# Patient Record
Sex: MALE | Race: WHITE | Employment: UNEMPLOYED | RURAL
[De-identification: names, ages, dates, MRNs, and addresses within clinical notes are randomized per-mention and may not be internally consistent; named-entity substitution may affect disease eponyms.]

---

## 2017-01-30 ENCOUNTER — OFFICE VISIT (OUTPATIENT)
Dept: FAMILY MEDICINE CLINIC | Age: 62
End: 2017-01-30

## 2017-01-30 VITALS
DIASTOLIC BLOOD PRESSURE: 73 MMHG | HEIGHT: 72 IN | SYSTOLIC BLOOD PRESSURE: 126 MMHG | BODY MASS INDEX: 24.03 KG/M2 | OXYGEN SATURATION: 96 % | HEART RATE: 66 BPM | TEMPERATURE: 98.2 F | WEIGHT: 177.4 LBS | RESPIRATION RATE: 20 BRPM

## 2017-01-30 DIAGNOSIS — Z13.39 SCREENING FOR ALCOHOLISM: ICD-10-CM

## 2017-01-30 DIAGNOSIS — Z13.31 SCREENING FOR DEPRESSION: ICD-10-CM

## 2017-01-30 DIAGNOSIS — Z00.00 ROUTINE GENERAL MEDICAL EXAMINATION AT A HEALTH CARE FACILITY: Primary | ICD-10-CM

## 2017-01-30 NOTE — MR AVS SNAPSHOT
Visit Information Date & Time Provider Department Dept. Phone Encounter #  
 1/30/2017  9:20 AM Mayito Demarco MD [facility] [place] [id number] Upcoming Health Maintenance Date Due Pneumococcal 19-64 Medium Risk (1 of 1 - PPSV23) 12/15/1972 DTaP/Tdap/Td series (1 - Tdap) 12/15/1974 ZOSTER VACCINE AGE 60> 12/15/2013 FOBT Q 1 YEAR AGE 50-75 11/17/2017 Allergies as of 1/30/2017  Review Complete On: 1/30/2017 By: Mayito Demarco MD  
  
 Severity Noted Reaction Type Reactions Percocet [Oxycodone-acetaminophen]  01/28/2015    Rash, Itching  
 itching Current Immunizations  Never Reviewed No immunizations on file. Not reviewed this visit You Were Diagnosed With   
  
 Codes Comments Routine general medical examination at a health care facility    -  Primary ICD-10-CM: Z00.00 ICD-9-CM: V70.0 Screening for alcoholism     ICD-10-CM: Z13.89 ICD-9-CM: V79.1 Screening for depression     ICD-10-CM: Z13.89 ICD-9-CM: V79.0 Vitals BP Pulse Temp Resp Height(growth percentile) Weight(growth percentile) 126/73 66 98.2 °F (36.8 °C) (Oral) 20 6' (1.829 m) 177 lb 6.4 oz (80.5 kg) SpO2 BMI Smoking Status 96% 24.06 kg/m2 Current Every Day Smoker Vitals History BMI and BSA Data Body Mass Index Body Surface Area 24.06 kg/m 2 2.02 m 2 Preferred Pharmacy Pharmacy Name Phone [facility] [address] [phone] Your Updated Medication List  
  
   
This list is accurate as of: 1/30/17  9:55 AM.  Always use your most recent med list.  
  
  
  
  
 diazePAM 5 mg tablet Commonly known as:  VALIUM Take 1 Tab by mouth every eight (8) hours as needed. Max Daily Amount: 15 mg. DULoxetine 30 mg capsule Commonly known as:  CYMBALTA TAKE ONE CAPSULE BY MOUTH EVERY DAY  
  
 hydrocortisone 2.5 % topical cream  
 Commonly known as:  ANUSOL-HC Apply  to affected area two (2) times a day. use thin layer  
  
 pravastatin 40 mg tablet Commonly known as:  PRAVACHOL  
TAKE ONE TABLET BY MOUTH nightly * tamsulosin 0.4 mg capsule Commonly known as:  FLOMAX Take 1 Cap by mouth daily for 30 days. Indications: Urolithiasis * tamsulosin 0.4 mg capsule Commonly known as:  FLOMAX Take 1 Cap by mouth daily. Indications: Urolithiasis  
  
 traZODone 150 mg tablet Commonly known as:  DESYREL  
TAKE ONE TABLET BY MOUTH NIGHTLY * Notice: This list has 2 medication(s) that are the same as other medications prescribed for you. Read the directions carefully, and ask your doctor or other care provider to review them with you. We Performed the Following [place] ANNUAL ALCOHOL SCREEN 15 MIN [id number] [facility]] Introducing [facility] SERVICES! Erin Medley introduces [employer] patient portal. Now you can access parts of your medical record, email your doctor's office, and request medication refills online. 1. In your internet browser, go to [email or web address]. [employer] 2. Click on the First Time User? Click Here link in the Sign In box. You will see the New Member Sign Up page. 3. Enter your [employer] Access Code exactly as it appears below. You will not need to use this code after youve completed the sign-up process. If you do not sign up before the expiration date, you must request a new code. · [employer] Access Code: [email or web address] Expires: 2/15/2017  1:35 PM 
 
4. Enter the last four digits of your Social Security Number (xxxx) and Date of Birth (mm/dd/yyyy) as indicated and click Submit. You will be taken to the next sign-up page. 5. Create a [employer] ID. This will be your [employer] login ID and cannot be changed, so think of one that is secure and easy to remember. 6. Create a [employer] password. You can change your password at any time. 7. Enter your Password Reset Question and Answer. This can be used at a later time if you forget your password. 8. Enter your e-mail address. You will receive e-mail notification when new information is available in [address] 9. Click Sign Up. You can now view and download portions of your medical record. 10. Click the Download Summary menu link to download a portable copy of your medical information. If you have questions, please visit the Frequently Asked Questions section of the [employer] website. Remember, [employer] is NOT to be used for urgent needs. For medical emergencies, dial 911. Now available from your iPhone and Android! Please provide this summary of care documentation to your next provider. Your primary care clinician is listed as Donnette Gitelman. If you have any questions after today's visit, please call [phone].

## 2017-01-30 NOTE — PROGRESS NOTES
This is a Subsequent Medicare Annual Wellness Visit providing Personalized Prevention Plan Services (PPPS) (Performed 12 months after initial AWV and PPPS )    I have reviewed the patient's medical history in detail and updated the computerized patient record. History     Past Medical History   Diagnosis Date    Adverse effect of anesthesia      \"major fear of needles\"/raw feeling down throat    Arthritis      osteo    Chronic obstructive pulmonary disease (HCC)     Chronic pain      lower back - stenosis - had injections/steroids    Hypercholesterolemia     Ill-defined condition      low BP but not a problem    Psychiatric disorder      depression      Past Surgical History   Procedure Laterality Date    Hx colonoscopy      Hx gi       surgery for hemorrhoids    Hx heent       all teeth removed    Hx other surgical       growth on left side of testicle removed - benign    Hx orthopaedic       left hip replacement    Hx orthopaedic       infection after sutured finger/then healed/then had a growth removed 2 1/2 yrs later - benign    Colonoscopy N/A 8/23/2016     COLONOSCOPY performed by Amarilis Cooper MD at [facility] ENDOSCOPY     Current Outpatient Prescriptions   Medication Sig Dispense Refill    tamsulosin (FLOMAX) 0.4 mg capsule Take 1 Cap by mouth daily. Indications: Urolithiasis 30 Cap 3    pravastatin (PRAVACHOL) 40 mg tablet TAKE ONE TABLET BY MOUTH nightly 30 Tab 3    traZODone (DESYREL) 150 mg tablet TAKE ONE TABLET BY MOUTH NIGHTLY 30 Tab 3    DULoxetine (CYMBALTA) 30 mg capsule TAKE ONE CAPSULE BY MOUTH EVERY DAY 30 Cap 3    diazePAM (VALIUM) 5 mg tablet Take 1 Tab by mouth every eight (8) hours as needed. Max Daily Amount: 15 mg. 30 Tab 3    hydrocortisone (ANUSOL-HC) 2.5 % topical cream Apply  to affected area two (2) times a day. use thin layer 30 g 0    tamsulosin (FLOMAX) 0.4 mg capsule Take 1 Cap by mouth daily for 30 days.  Indications: Urolithiasis 8 Cap 0     Allergies Allergen Reactions    Percocet [Oxycodone-Acetaminophen] Rash and Itching     itching     Family History   Problem Relation Age of Onset    Cancer Mother      breast    Stroke Mother      x2    Cancer Father      throat/lung    Heart Disease Father     Lung Disease Father     Thyroid Disease Father     Heart Attack Father     Suicide Brother     Psychiatric Disorder Brother     Heart Attack Paternal Uncle     Heart Attack Paternal Uncle      x3     Social History   Substance Use Topics    Smoking status: Current Every Day Smoker     Packs/day: 0.50     Years: 45.00    Smokeless tobacco: Never Used    Alcohol use No     Patient Active Problem List   Diagnosis Code    Hyperlipemia E78.5    Insomnia G47.00    History of left hip replacement Z96.642    Depression F32.9    Tobacco abuse Z72.0       Depression Risk Factor Screening:     PHQ 2 / 9, over the last two weeks 11/17/2016   Little interest or pleasure in doing things Not at all   Feeling down, depressed or hopeless Not at all   Total Score PHQ 2 0     Alcohol Risk Factor Screening: On any occasion during the past 3 months, have you had more than 4 drinks containing alcohol? No    Do you average more than 14 drinks per week? No    Functional Ability and Level of Safety:     Hearing Loss   none    Activities of Daily Living   Self-care. Requires assistance with: no ADLs    Fall Risk     Fall Risk Assessment, last 12 mths 11/17/2016   Able to walk? Yes   Fall in past 12 months?  No     Abuse Screen   Patient is not abused    Review of Systems:  Constitutional: Negative for fatigue or malaise  Derm: Negative for rash or lesion  HEENT: Negative for acute hearing or vision changes  Cardiovascular: Negative for dizziness, chest pain or palpitations  Respiratory: Negative for cough, wheezing or SOB  Gastreintestinal: Negative for nausea or abdominal pain  Genital/urinary: Negative for dysuria or voiding dysfunction  Muscoloskeletal: Negative for acute myalgias or arthralgias   Neurological: Negative for headache, weakness or paresthesia  Psychological: Negative for depression or anxiety      Evaluation of Cognitive Function:  Mood/affect:  neutral  Appearance: age appropriate and casually dressed  Family member/caregiver input: none    Health Maintenance Due   Topic Date Due    Pneumococcal 19-64 Medium Risk (1 of 1 - PPSV23) 12/15/1972    DTaP/Tdap/Td series (1 - Tdap) 12/15/1974    ZOSTER VACCINE AGE 60>  12/15/2013     Risk, benefits of recommended health maintenance discussed. Patient expressed understanding and declined at this time. Physical Examination:  General: Well developed, well nourished, in no acute distress  Skin: Warm and dry, no rash or lesion appreciated  Head: Normocephalic, atraumatic  Eyes: Sclera clear, EOMI  Neck: Normal range of motion  Respiratory: Clear to auscultation bilaterally with symmetrical effort  Cardiovascular: Normal S1, S2, Regular rate and rhythm  Extremities: Full range of motion, antalgic gait  Neurological: Normal strength and sensation. No focal deficits  Psychological: Active, alert and oriented. Affect appropriate     Patient Care Team:  Dereck Ramires MD as PCP - General (Family Practice)  Evangelina Bryant MD (Orthopedic Surgery)  Fredrick Hudson MD (Neurosurgery)    Advice/Referrals/Counseling   Education and counseling provided:  Are appropriate based on today's review and evaluation  End-of-Life planning (with patient's consent)    Assessment/Plan       ICD-10-CM ICD-9-CM    1. Routine general medical examination at a health care facility Z00.00 V70.0    2. Screening for alcoholism Z13.89 V79.1 [place] ANNUAL ALCOHOL SCREEN 15 MIN   3. Screening for depression Z13.89 V79.0    .

## 2017-01-30 NOTE — PROGRESS NOTES
Health Maintenance Due   Topic Date Due    Pneumococcal 19-64 Medium Risk (1 of 1 - PPSV23) 12/15/1972    DTaP/Tdap/Td series (1 - Tdap) 12/15/1974    ZOSTER VACCINE AGE 60>  12/15/2013

## 2017-03-13 ENCOUNTER — OFFICE VISIT (OUTPATIENT)
Dept: FAMILY MEDICINE CLINIC | Age: 62
End: 2017-03-13

## 2017-03-13 VITALS
TEMPERATURE: 98.5 F | HEIGHT: 72 IN | WEIGHT: 174 LBS | HEART RATE: 61 BPM | OXYGEN SATURATION: 98 % | BODY MASS INDEX: 23.57 KG/M2 | DIASTOLIC BLOOD PRESSURE: 83 MMHG | SYSTOLIC BLOOD PRESSURE: 129 MMHG | RESPIRATION RATE: 18 BRPM

## 2017-03-13 DIAGNOSIS — Z71.82 EXERCISE COUNSELING: ICD-10-CM

## 2017-03-13 DIAGNOSIS — F33.41 RECURRENT MAJOR DEPRESSIVE DISORDER, IN PARTIAL REMISSION (HCC): ICD-10-CM

## 2017-03-13 DIAGNOSIS — J30.89 PERENNIAL ALLERGIC RHINITIS, UNSPECIFIED ALLERGIC RHINITIS TRIGGER: ICD-10-CM

## 2017-03-13 DIAGNOSIS — M48.061 SPINAL STENOSIS, LUMBAR: ICD-10-CM

## 2017-03-13 DIAGNOSIS — R97.20 ELEVATED PSA: ICD-10-CM

## 2017-03-13 DIAGNOSIS — I10 ESSENTIAL HYPERTENSION: ICD-10-CM

## 2017-03-13 DIAGNOSIS — Z71.3 DIETARY COUNSELING AND SURVEILLANCE: ICD-10-CM

## 2017-03-13 DIAGNOSIS — M51.36 DDD (DEGENERATIVE DISC DISEASE), LUMBAR: Primary | ICD-10-CM

## 2017-03-13 DIAGNOSIS — E78.5 HYPERLIPIDEMIA, UNSPECIFIED HYPERLIPIDEMIA TYPE: ICD-10-CM

## 2017-03-13 DIAGNOSIS — R63.4 WEIGHT LOSS: ICD-10-CM

## 2017-03-13 RX ORDER — TIZANIDINE 4 MG/1
4 TABLET ORAL
Qty: 120 TAB | Refills: 2 | Status: SHIPPED | OUTPATIENT
Start: 2017-03-13 | End: 2017-06-11

## 2017-03-13 RX ORDER — MONTELUKAST SODIUM 10 MG/1
10 TABLET ORAL DAILY
Qty: 30 TAB | Refills: 2 | Status: SHIPPED | OUTPATIENT
Start: 2017-03-13 | End: 2017-06-02 | Stop reason: SDUPTHER

## 2017-03-13 RX ORDER — HYDROCODONE BITARTRATE AND ACETAMINOPHEN 10; 325 MG/1; MG/1
1 TABLET ORAL
Qty: 60 TAB | Refills: 0 | Status: SHIPPED | OUTPATIENT
Start: 2017-03-13 | End: 2017-03-28

## 2017-03-13 NOTE — PROGRESS NOTES
Reviewed record in preparation for visit and have necessary documentation  Pt did not bring medication to office visit for review  opportunity was given for questions  Goals that were addressed and/or need to be completed during or after this appointment include    Health Maintenance Due   Topic Date Due    Pneumococcal 19-64 Medium Risk (1 of 1 - PPSV23) 12/15/1972    DTaP/Tdap/Td series (1 - Tdap) 12/15/1974    ZOSTER VACCINE AGE 60>  12/15/2013

## 2017-03-13 NOTE — PATIENT INSTRUCTIONS
Depression and Chronic Disease: Care Instructions  Your Care Instructions  A chronic disease is one that you have for a long time. Some chronic diseases can be controlled, but they usually cannot be cured. Depression is common in people with chronic diseases, but it often goes unnoticed. Many people have concerns about seeking treatment for a mental health problem. You may think it's a sign of weakness, or you don't want people to know about it. It's important to overcome these reasons for not seeking treatment. Treating depression or anxiety is good for your health. Follow-up care is a key part of your treatment and safety. Be sure to make and go to all appointments, and call your doctor if you are having problems. It's also a good idea to know your test results and keep a list of the medicines you take. How can you care for yourself at home? Watch for symptoms of depression  The symptoms of depression are often subtle at first. You may think they are caused by your disease rather than depression. Or you may think it is normal to be depressed when you have a chronic disease. If you are depressed you may:  · Feel sad or hopeless. · Feel guilty or worthless. · Not enjoy the things you used to enjoy. · Feel hopeless, as though life is not worth living. · Have trouble thinking or remembering. · Have low energy, and you may not eat or sleep well. · Pull away from others. · Think often about death or killing yourself. (Keep the numbers for these national suicide hotlines: 8-130-539-TALK [1-130.411.7838] and 5-780-MUEWMML [1-391.124.7415]. )  Get treatment  By treating your depression, you can feel more hopeful and have more energy. If you feel better, you may take better care of yourself, so your health may improve. · Talk to your doctor if you have any changes in mood during treatment for your disease. · Ask your doctor for help.  Counseling, antidepressant medicine, or a combination of the two can help most people with depression. Often a combination works best. Counseling can also help you cope with having a chronic disease. When should you call for help? Call 911 anytime you think you may need emergency care. For example, call if:  · You feel like hurting yourself or someone else. · Someone you know has depression and is about to attempt or is attempting suicide. Call your doctor now or seek immediate medical care if:  · You hear voices. · Someone you know has depression and:  ¨ Starts to give away his or her possessions. ¨ Uses illegal drugs or drinks alcohol heavily. ¨ Talks or writes about death, including writing suicide notes or talking about guns, knives, or pills. ¨ Starts to spend a lot of time alone. ¨ Acts very aggressively or suddenly appears calm. Watch closely for changes in your health, and be sure to contact your doctor if:  · You do not get better as expected. Where can you learn more? Go to http://catrachoShotfarmbryce.info/. Enter P478 in the search box to learn more about \"Depression and Chronic Disease: Care Instructions. \"  Current as of: July 26, 2016  Content Version: 11.1  © 7750-8634 [employer]. Care instructions adapted under license by [employer] (which disclaims liability or warranty for this information). If you have questions about a medical condition or this instruction, always ask your healthcare professional. [address] any warranty or liability for your use of this information. High Cholesterol: Care Instructions  Your Care Instructions  Cholesterol is a type of fat in your blood. It is needed for many body functions, such as making new cells. Cholesterol is made by your body. It also comes from food you eat. High cholesterol means that you have too much of the fat in your blood. This raises your risk of a heart attack and stroke. LDL and HDL are part of your total cholesterol.  LDL is the \"bad\" cholesterol. High LDL can raise your risk for heart disease, heart attack, and stroke. HDL is the \"good\" cholesterol. It helps clear bad cholesterol from the body. High HDL is linked with a lower risk of heart disease, heart attack, and stroke. Your cholesterol levels help your doctor find out your risk for having a heart attack or stroke. You and your doctor can talk about whether you need to lower your risk and what treatment is best for you. A heart-healthy lifestyle along with medicines can help lower your cholesterol and your risk. The way you choose to lower your risk will depend on how high your risk is for heart attack and stroke. It will also depend on how you feel about taking medicines. Follow-up care is a key part of your treatment and safety. Be sure to make and go to all appointments, and call your doctor if you are having problems. It's also a good idea to know your test results and keep a list of the medicines you take. How can you care for yourself at home? · Eat a variety of foods every day. Good choices include fruits, vegetables, whole grains (like oatmeal), dried beans and peas, nuts and seeds, soy products (like tofu), and fat-free or low-fat dairy products. · Replace butter, margarine, and hydrogenated or partially hydrogenated oils with olive and canola oils. (Canola oil margarine without trans fat is fine.)  · Replace red meat with fish, poultry, and soy protein (like tofu). · Limit processed and packaged foods like chips, crackers, and cookies. · Bake, broil, or steam foods. Don't garcia them. · Be physically active. Get at least 30 minutes of exercise on most days of the week. Walking is a good choice. You also may want to do other activities, such as running, swimming, cycling, or playing tennis or team sports. · Stay at a healthy weight or lose weight by making the changes in eating and physical activity listed above.  Losing just a small amount of weight, even 5 to 10 pounds, can reduce your risk for having a heart attack or stroke. · Do not smoke. When should you call for help? Watch closely for changes in your health, and be sure to contact your doctor if:  · You need help making lifestyle changes. · You have questions about your medicine. Where can you learn more? Go to http://catracho-bryce.info/. Enter S600 in the search box to learn more about \"High Cholesterol: Care Instructions. \"  Current as of: January 27, 2016  Content Version: 11.1  © 5341-1702 [email or web address]. Care instructions adapted under license by [employer] (which disclaims liability or warranty for this information). If you have questions about a medical condition or this instruction, always ask your healthcare professional. [address] any warranty or liability for your use of this information. High Blood Pressure: Care Instructions  Your Care Instructions  If your blood pressure is usually above 140/90, you have high blood pressure, or hypertension. That means the top number is 140 or higher or the bottom number is 90 or higher, or both. Despite what a lot of people think, high blood pressure usually doesn't cause headaches or make you feel dizzy or lightheaded. It usually has no symptoms. But it does increase your risk for heart attack, stroke, and kidney or eye damage. The higher your blood pressure, the more your risk increases. Your doctor will give you a goal for your blood pressure. Your goal will be based on your health and your age. An example of a goal is to keep your blood pressure below 140/90. Lifestyle changes, such as eating healthy and being active, are always important to help lower blood pressure. You might also take medicine to reach your blood pressure goal.  Follow-up care is a key part of your treatment and safety. Be sure to make and go to all appointments, and call your doctor if you are having problems.  It's also a good idea to know your test results and keep a list of the medicines you take. How can you care for yourself at home? Medical treatment  · If you stop taking your medicine, your blood pressure will go back up. You may take one or more types of medicine to lower your blood pressure. Be safe with medicines. Take your medicine exactly as prescribed. Call your doctor if you think you are having a problem with your medicine. · Talk to your doctor before you start taking aspirin every day. Aspirin can help certain people lower their risk of a heart attack or stroke. But taking aspirin isn't right for everyone, because it can cause serious bleeding. · See your doctor regularly. You may need to see the doctor more often at first or until your blood pressure comes down. · If you are taking blood pressure medicine, talk to your doctor before you take decongestants or anti-inflammatory medicine, such as ibuprofen. Some of these medicines can raise blood pressure. · Learn how to check your blood pressure at home. Lifestyle changes  · Stay at a healthy weight. This is especially important if you put on weight around the waist. Losing even 10 pounds can help you lower your blood pressure. · If your doctor recommends it, get more exercise. Walking is a good choice. Bit by bit, increase the amount you walk every day. Try for at least 30 minutes on most days of the week. You also may want to swim, bike, or do other activities. · Avoid or limit alcohol. Talk to your doctor about whether you can drink any alcohol. · Try to limit how much sodium you eat to less than 2,300 milligrams (mg) a day. Your doctor may ask you to try to eat less than 1,500 mg a day. · Eat plenty of fruits (such as bananas and oranges), vegetables, legumes, whole grains, and low-fat dairy products. · Lower the amount of saturated fat in your diet. Saturated fat is found in animal products such as milk, cheese, and meat.  Limiting these foods may help you lose weight and also lower your risk for heart disease. · Do not smoke. Smoking increases your risk for heart attack and stroke. If you need help quitting, talk to your doctor about stop-smoking programs and medicines. These can increase your chances of quitting for good. When should you call for help? Call 911 anytime you think you may need emergency care. This may mean having symptoms that suggest that your blood pressure is causing a serious heart or blood vessel problem. Your blood pressure may be over 180/110. For example, call 911 if:  · You have symptoms of a heart attack. These may include:  ¨ Chest pain or pressure, or a strange feeling in the chest.  ¨ Sweating. ¨ Shortness of breath. ¨ Nausea or vomiting. ¨ Pain, pressure, or a strange feeling in the back, neck, jaw, or upper belly or in one or both shoulders or arms. ¨ Lightheadedness or sudden weakness. ¨ A fast or irregular heartbeat. · You have symptoms of a stroke. These may include:  ¨ Sudden numbness, tingling, weakness, or loss of movement in your face, arm, or leg, especially on only one side of your body. ¨ Sudden vision changes. ¨ Sudden trouble speaking. ¨ Sudden confusion or trouble understanding simple statements. ¨ Sudden problems with walking or balance. ¨ A sudden, severe headache that is different from past headaches. · You have severe back or belly pain. Do not wait until your blood pressure comes down on its own. Get help right away. Call your doctor now or seek immediate care if:  · Your blood pressure is much higher than normal (such as 180/110 or higher), but you don't have symptoms. · You think high blood pressure is causing symptoms, such as:  ¨ Severe headache. ¨ Blurry vision. Watch closely for changes in your health, and be sure to contact your doctor if:  · Your blood pressure measures 140/90 or higher at least 2 times.  That means the top number is 140 or higher or the bottom number is 90 or higher, or both.  · You think you may be having side effects from your blood pressure medicine. · Your blood pressure is usually normal, but it goes above normal at least 2 times. Where can you learn more? Go to http://catracho-bryce.info/. Enter H597 in the search box to learn more about \"High Blood Pressure: Care Instructions. \"  Current as of: August 8, 2016  Content Version: 11.1  © 9528-3030 [employer]. Care instructions adapted under license by [employer] (which disclaims liability or warranty for this information). If you have questions about a medical condition or this instruction, always ask your healthcare professional. [address] any warranty or liability for your use of this information. Chronic Pain: Care Instructions  Your Care Instructions  Chronic pain is pain that lasts a long time (months or even years) and may or may not have a clear cause. It is different from acute pain, which usually does have a clear cause--like an injury or illness--and gets better over time. Chronic pain:  · Lasts over time but may vary from day to day. · Does not go away despite efforts to end it. · May disrupt your sleep and lead to fatigue. · May cause depression or anxiety. · May make your muscles tense, causing more pain. · Can disrupt your work, hobbies, home life, and relationships with friends and family. Chronic pain is a very real condition. It is not just in your head. Treatment can help and usually includes several methods used together, such as medicines, physical therapy, exercise, and other treatments. Learning how to relax and changing negative thought patterns can also help you cope. Chronic pain is complex. Taking an active role in your treatment will help you better manage your pain. Tell your doctor if you have trouble dealing with your pain. You may have to try several things before you find what works best for you.   Follow-up care is a key part of your treatment and safety. Be sure to make and go to all appointments, and call your doctor if you are having problems. Its also a good idea to know your test results and keep a list of the medicines you take. How can you care for yourself at home? · Pace yourself. Break up large jobs into smaller tasks. Save harder tasks for days when you have less pain, or go back and forth between hard tasks and easier ones. Take rest breaks. · Relax, and reduce stress. Relaxation techniques such as deep breathing or meditation can help. · Keep moving. Gentle, daily exercise can help reduce pain over the long run. Try low- or no-impact exercises such as walking, swimming, and stationary biking. Do stretches to stay flexible. · Try heat, cold packs, and massage. · Get enough sleep. Chronic pain can make you tired and drain your energy. Talk with your doctor if you have trouble sleeping because of pain. · Think positive. Your thoughts can affect your pain level. Do things that you enjoy to distract yourself when you have pain instead of focusing on the pain. See a movie, read a book, listen to music, or spend time with a friend. · If you think you are depressed, talk to your doctor about treatment. · Keep a daily pain diary. Record how your moods, thoughts, sleep patterns, activities, and medicine affect your pain. You may find that your pain is worse during or after certain activities or when you are feeling a certain emotion. Having a record of your pain can help you and your doctor find the best ways to treat your pain. · Take pain medicines exactly as directed. ¨ If the doctor gave you a prescription medicine for pain, take it as prescribed. ¨ If you are not taking a prescription pain medicine, ask your doctor if you can take an over-the-counter medicine. Reducing constipation caused by pain medicine  · Include fruits, vegetables, beans, and whole grains in your diet each day.  These foods are high in fiber. · Drink plenty of fluids, enough so that your urine is light yellow or clear like water. If you have kidney, heart, or liver disease and have to limit fluids, talk with your doctor before you increase the amount of fluids you drink. · If your doctor recommends it, get more exercise. Walking is a good choice. Bit by bit, increase the amount you walk every day. Try for at least 30 minutes on most days of the week. · Schedule time each day for a bowel movement. A daily routine may help. Take your time and do not strain when having a bowel movement. When should you call for help? Call your doctor now or seek immediate medical care if:  · Your pain gets worse or is out of control. · You feel down or blue, or you do not enjoy things like you once did. You may be depressed, which is common in people with chronic pain. Depression can be treated. · You have vomiting or cramps for more than 2 hours. Watch closely for changes in your health, and be sure to contact your doctor if:  · You cannot sleep because of pain. · You are very worried or anxious about your pain. · You have trouble taking your pain medicine. · You have any concerns about your pain medicine. · You have trouble with bowel movements, such as:  ¨ No bowel movement in 3 days. ¨ Blood in the anal area, in your stool, or on the toilet paper. ¨ Diarrhea for more than 24 hours. Where can you learn more? Go to http://catracho-bryce.info/. Enter N004 in the search box to learn more about \"Chronic Pain: Care Instructions. \"  Current as of: February 19, 2016  Content Version: 11.1  © 5306-7630 [employer]. Care instructions adapted under license by [employer] (which disclaims liability or warranty for this information).  If you have questions about a medical condition or this instruction, always ask your healthcare professional. [address] any warranty or liability for your use of this information.

## 2017-03-13 NOTE — MR AVS SNAPSHOT
Visit Information Date & Time Provider Department Dept. Phone Encounter #  
 3/13/2017  2:25 PM Meena Dooley  [facility] [phone] [id number] Follow-up Instructions Return in about 4 weeks (around 4/10/2017), or if symptoms worsen or fail to improve. Your Appointments 7/31/2017  9:00 AM  
ROUTINE CARE with Danial Meneses MD  
[facility]) Appt Note: 6 mnth fu est pt for depression [address] [address]  
[place] [address] [address] Upcoming Health Maintenance Date Due Pneumococcal 19-64 Medium Risk (1 of 1 - PPSV23) 12/15/1972 DTaP/Tdap/Td series (1 - Tdap) 12/15/1974 ZOSTER VACCINE AGE 60> 12/15/2013 FOBT Q 1 YEAR AGE 50-75 11/17/2017 Allergies as of 3/13/2017  Review Complete On: 3/13/2017 By: Marvin Sagastume LPN Severity Noted Reaction Type Reactions Percocet [Oxycodone-acetaminophen]  01/28/2015    Rash, Itching  
 itching Current Immunizations  Never Reviewed No immunizations on file. Not reviewed this visit You Were Diagnosed With   
  
 Codes Comments DDD (degenerative disc disease), lumbar    -  Primary ICD-10-CM: M51.36 
ICD-9-CM: 722.52 Spinal stenosis, lumbar     ICD-10-CM: M48.06 
ICD-9-CM: 724.02 Hyperlipidemia, unspecified hyperlipidemia type     ICD-10-CM: E78.5 ICD-9-CM: 272.4 Recurrent major depressive disorder, in partial remission [facility]     ICD-10-CM: F33.41 
ICD-9-CM: 296.35 Essential hypertension     ICD-10-CM: I10 
ICD-9-CM: 401.9 Elevated PSA     ICD-10-CM: R97.20 ICD-9-CM: 790.93 Weight loss     ICD-10-CM: R63.4 ICD-9-CM: 783.21 Perennial allergic rhinitis, unspecified allergic rhinitis trigger     ICD-10-CM: J30.89 ICD-9-CM: 477.8 Vitals BP Pulse Temp Resp Height(growth percentile) Weight(growth percentile) 129/83 (BP 1 Location: Left arm, BP Patient Position: Sitting) 61 98.5 °F (36.9 °C) (Oral) 18 6' (1.829 m) 174 lb (78.9 kg) SpO2 BMI Smoking Status 98% 23.6 kg/m2 Current Every Day Smoker Vitals History BMI and BSA Data Body Mass Index Body Surface Area  
 23.6 kg/m 2 2 m 2 Preferred Pharmacy Pharmacy Name Phone [facility] [address] [phone] Your Updated Medication List  
  
   
This list is accurate as of: 3/13/17  3:39 PM.  Always use your most recent med list.  
  
  
  
  
 diazePAM 5 mg tablet Commonly known as:  VALIUM Take 1 Tab by mouth every eight (8) hours as needed. Max Daily Amount: 15 mg. HYDROcodone-acetaminophen  mg tablet Commonly known as:  Rosa Parisian Take 1 Tab by mouth every six (6) hours as needed for Pain for up to 15 days. Max Daily Amount: 4 Tabs. Indications: Pain  
  
 hydrocortisone 2.5 % topical cream  
Commonly known as:  ANUSOL-HC Apply  to affected area two (2) times a day. use thin layer  
  
 montelukast 10 mg tablet Commonly known as:  SINGULAIR Take 1 Tab by mouth daily for 90 days. Indications: ALLERGIC RHINITIS  
  
 pravastatin 40 mg tablet Commonly known as:  PRAVACHOL  
TAKE ONE TABLET BY MOUTH nightly  
  
 tamsulosin 0.4 mg capsule Commonly known as:  FLOMAX Take 1 Cap by mouth daily for 30 days. Indications: Urolithiasis tiZANidine 4 mg tablet Commonly known as:  Rosa Isela Flattery Take 1 Tab by mouth every six (6) hours as needed for up to 90 days. Indications: MUSCLE SPASM  
  
 traZODone 150 mg tablet Commonly known as:  DESYREL  
TAKE ONE TABLET BY MOUTH NIGHTLY Prescriptions Printed Refills HYDROcodone-acetaminophen (NORCO)  mg tablet 0  Sig: Take 1 Tab by mouth every six (6) hours as needed for Pain for up to 15 days. Max Daily Amount: 4 Tabs. Indications: Pain Class: Print Route: Oral  
  
Prescriptions Sent to Pharmacy Refills  
 tiZANidine (ZANAFLEX) 4 mg tablet 2 Sig: Take 1 Tab by mouth every six (6) hours as needed for up to 90 days. Indications: MUSCLE SPASM Class: Normal  
 Pharmacy: [facility] [address] Ph #: [phone] Route: Oral  
 montelukast (SINGULAIR) 10 mg tablet 2 Sig: Take 1 Tab by mouth daily for 90 days. Indications: ALLERGIC RHINITIS Class: Normal  
 Pharmacy: [facility] [address] Ph #: [phone] Route: Oral  
  
We Performed the Following [id number] 9+OXYCODONE+CRT-UNBUND [67494 CPT(R)] CBC WITH AUTOMATED DIFF [99570 CPT(R)] HIV 1/2 AG/AB, 4TH GENERATION,W RFLX CONFIRM [[id number] Custom] METABOLIC PANEL, COMPREHENSIVE [22863 CPT(R)] PSA W/ REFLX FREE PSA [93225 CPT(R)] RPR [43680 CPT(R)] Follow-up Instructions Return in about 4 weeks (around 4/10/2017), or if symptoms worsen or fail to improve. To-Do List   
 03/13/2017 Imaging:  MRI LUMB SPINE WO CONT Patient Instructions Depression and Chronic Disease: Care Instructions Your Care Instructions A chronic disease is one that you have for a long time. Some chronic diseases can be controlled, but they usually cannot be cured. Depression is common in people with chronic diseases, but it often goes unnoticed. Many people have concerns about seeking treatment for a mental health problem. You may think it's a sign of weakness, or you don't want people to know about it. It's important to overcome these reasons for not seeking treatment. Treating depression or anxiety is good for your health. Follow-up care is a key part of your treatment and safety.  Be sure to make and go to all appointments, and call your doctor if you are having problems. It's also a good idea to know your test results and keep a list of the medicines you take. How can you care for yourself at home? Watch for symptoms of depression The symptoms of depression are often subtle at first. You may think they are caused by your disease rather than depression. Or you may think it is normal to be depressed when you have a chronic disease. If you are depressed you may: · Feel sad or hopeless. · Feel guilty or worthless. · Not enjoy the things you used to enjoy. · Feel hopeless, as though life is not worth living. · Have trouble thinking or remembering. · Have low energy, and you may not eat or sleep well. · Pull away from others. · Think often about death or killing yourself. (Keep the numbers for these national suicide hotlines: 0-823-772-TALK [1-509.198.5118] and 1-130-ZYJLVUR [1-532.731.2278]. ) Get treatment By treating your depression, you can feel more hopeful and have more energy. If you feel better, you may take better care of yourself, so your health may improve. · Talk to your doctor if you have any changes in mood during treatment for your disease. · Ask your doctor for help. Counseling, antidepressant medicine, or a combination of the two can help most people with depression. Often a combination works best. Counseling can also help you cope with having a chronic disease. When should you call for help? Call 911 anytime you think you may need emergency care. For example, call if: 
· You feel like hurting yourself or someone else. · Someone you know has depression and is about to attempt or is attempting suicide. Call your doctor now or seek immediate medical care if: 
· You hear voices. · Someone you know has depression and: 
¨ Starts to give away his or her possessions. ¨ Uses illegal drugs or drinks alcohol heavily. ¨ Talks or writes about death, including writing suicide notes or talking about guns, knives, or pills. ¨ Starts to spend a lot of time alone. ¨ Acts very aggressively or suddenly appears calm. Watch closely for changes in your health, and be sure to contact your doctor if: 
· You do not get better as expected. Where can you learn more? Go to http://catracho-bryce.info/. Enter V184 in the search box to learn more about \"Depression and Chronic Disease: Care Instructions. \" Current as of: July 26, 2016 Content Version: 11.1 © 9513-5613 ED01. Care instructions adapted under license by [employer] (which disclaims liability or warranty for this information). If you have questions about a medical condition or this instruction, always ask your healthcare professional. [place] [address] any warranty or liability for your use of this information. High Cholesterol: Care Instructions Your Care Instructions Cholesterol is a type of fat in your blood. It is needed for many body functions, such as making new cells. Cholesterol is made by your body. It also comes from food you eat. High cholesterol means that you have too much of the fat in your blood. This raises your risk of a heart attack and stroke. LDL and HDL are part of your total cholesterol. LDL is the \"bad\" cholesterol. High LDL can raise your risk for heart disease, heart attack, and stroke. HDL is the \"good\" cholesterol. It helps clear bad cholesterol from the body. High HDL is linked with a lower risk of heart disease, heart attack, and stroke. Your cholesterol levels help your doctor find out your risk for having a heart attack or stroke. You and your doctor can talk about whether you need to lower your risk and what treatment is best for you. A heart-healthy lifestyle along with medicines can help lower your cholesterol and your risk. The way you choose to lower your risk will depend on how high your risk is for heart attack and stroke.  It will also depend on how you feel about taking medicines. Follow-up care is a key part of your treatment and safety. Be sure to make and go to all appointments, and call your doctor if you are having problems. It's also a good idea to know your test results and keep a list of the medicines you take. How can you care for yourself at home? · Eat a variety of foods every day. Good choices include fruits, vegetables, whole grains (like oatmeal), dried beans and peas, nuts and seeds, soy products (like tofu), and fat-free or low-fat dairy products. · Replace butter, margarine, and hydrogenated or partially hydrogenated oils with olive and canola oils. (Canola oil margarine without trans fat is fine.) · Replace red meat with fish, poultry, and soy protein (like tofu). · Limit processed and packaged foods like chips, crackers, and cookies. · Bake, broil, or steam foods. Don't garcia them. · Be physically active. Get at least 30 minutes of exercise on most days of the week. Walking is a good choice. You also may want to do other activities, such as running, swimming, cycling, or playing tennis or team sports. · Stay at a healthy weight or lose weight by making the changes in eating and physical activity listed above. Losing just a small amount of weight, even 5 to 10 pounds, can reduce your risk for having a heart attack or stroke. · Do not smoke. When should you call for help? Watch closely for changes in your health, and be sure to contact your doctor if: 
· You need help making lifestyle changes. · You have questions about your medicine. Where can you learn more? Go to http://catracho-bryce.info/. Enter J045 in the search box to learn more about \"High Cholesterol: Care Instructions. \" Current as of: January 27, 2016 Content Version: 11.1 © 7047-7970 [employer].  Care instructions adapted under license by [employer] (which disclaims liability or warranty for this information). If you have questions about a medical condition or this instruction, always ask your healthcare professional. [address] any warranty or liability for your use of this information. High Blood Pressure: Care Instructions Your Care Instructions If your blood pressure is usually above 140/90, you have high blood pressure, or hypertension. That means the top number is 140 or higher or the bottom number is 90 or higher, or both. Despite what a lot of people think, high blood pressure usually doesn't cause headaches or make you feel dizzy or lightheaded. It usually has no symptoms. But it does increase your risk for heart attack, stroke, and kidney or eye damage. The higher your blood pressure, the more your risk increases. Your doctor will give you a goal for your blood pressure. Your goal will be based on your health and your age. An example of a goal is to keep your blood pressure below 140/90. Lifestyle changes, such as eating healthy and being active, are always important to help lower blood pressure. You might also take medicine to reach your blood pressure goal. 
Follow-up care is a key part of your treatment and safety. Be sure to make and go to all appointments, and call your doctor if you are having problems. It's also a good idea to know your test results and keep a list of the medicines you take. How can you care for yourself at home? Medical treatment · If you stop taking your medicine, your blood pressure will go back up. You may take one or more types of medicine to lower your blood pressure. Be safe with medicines. Take your medicine exactly as prescribed. Call your doctor if you think you are having a problem with your medicine. · Talk to your doctor before you start taking aspirin every day. Aspirin can help certain people lower their risk of a heart attack or stroke.  But taking aspirin isn't right for everyone, because it can cause serious bleeding. · See your doctor regularly. You may need to see the doctor more often at first or until your blood pressure comes down. · If you are taking blood pressure medicine, talk to your doctor before you take decongestants or anti-inflammatory medicine, such as ibuprofen. Some of these medicines can raise blood pressure. · Learn how to check your blood pressure at home. Lifestyle changes · Stay at a healthy weight. This is especially important if you put on weight around the waist. Losing even 10 pounds can help you lower your blood pressure. · If your doctor recommends it, get more exercise. Walking is a good choice. Bit by bit, increase the amount you walk every day. Try for at least 30 minutes on most days of the week. You also may want to swim, bike, or do other activities. · Avoid or limit alcohol. Talk to your doctor about whether you can drink any alcohol. · Try to limit how much sodium you eat to less than 2,300 milligrams (mg) a day. Your doctor may ask you to try to eat less than 1,500 mg a day. · Eat plenty of fruits (such as bananas and oranges), vegetables, legumes, whole grains, and low-fat dairy products. · Lower the amount of saturated fat in your diet. Saturated fat is found in animal products such as milk, cheese, and meat. Limiting these foods may help you lose weight and also lower your risk for heart disease. · Do not smoke. Smoking increases your risk for heart attack and stroke. If you need help quitting, talk to your doctor about stop-smoking programs and medicines. These can increase your chances of quitting for good. When should you call for help? Call 911 anytime you think you may need emergency care. This may mean having symptoms that suggest that your blood pressure is causing a serious heart or blood vessel problem. Your blood pressure may be over 180/110. For example, call 911 if: 
· You have symptoms of a heart attack. These may include: ¨ Chest pain or pressure, or a strange feeling in the chest. 
¨ Sweating. ¨ Shortness of breath. ¨ Nausea or vomiting. ¨ Pain, pressure, or a strange feeling in the back, neck, jaw, or upper belly or in one or both shoulders or arms. ¨ Lightheadedness or sudden weakness. ¨ A fast or irregular heartbeat. · You have symptoms of a stroke. These may include: 
¨ Sudden numbness, tingling, weakness, or loss of movement in your face, arm, or leg, especially on only one side of your body. ¨ Sudden vision changes. ¨ Sudden trouble speaking. ¨ Sudden confusion or trouble understanding simple statements. ¨ Sudden problems with walking or balance. ¨ A sudden, severe headache that is different from past headaches. · You have severe back or belly pain. Do not wait until your blood pressure comes down on its own. Get help right away. Call your doctor now or seek immediate care if: 
· Your blood pressure is much higher than normal (such as 180/110 or higher), but you don't have symptoms. · You think high blood pressure is causing symptoms, such as: ¨ Severe headache. ¨ Blurry vision. Watch closely for changes in your health, and be sure to contact your doctor if: 
· Your blood pressure measures 140/90 or higher at least 2 times. That means the top number is 140 or higher or the bottom number is 90 or higher, or both. · You think you may be having side effects from your blood pressure medicine. · Your blood pressure is usually normal, but it goes above normal at least 2 times. Where can you learn more? Go to http://catracho-bryce.info/. Enter P808 in the search box to learn more about \"High Blood Pressure: Care Instructions. \" Current as of: August 8, 2016 Content Version: 11.1 © 3256-8048 [employer]. Care instructions adapted under license by [employer] (which disclaims liability or warranty for this information).  If you have questions about a medical condition or this instruction, always ask your healthcare professional. [place] [address] any warranty or liability for your use of this information. Chronic Pain: Care Instructions Your Care Instructions Chronic pain is pain that lasts a long time (months or even years) and may or may not have a clear cause. It is different from acute pain, which usually does have a clear causelike an injury or illnessand gets better over time. Chronic pain: 
· Lasts over time but may vary from day to day. · Does not go away despite efforts to end it. · May disrupt your sleep and lead to fatigue. · May cause depression or anxiety. · May make your muscles tense, causing more pain. · Can disrupt your work, hobbies, home life, and relationships with friends and family. Chronic pain is a very real condition. It is not just in your head. Treatment can help and usually includes several methods used together, such as medicines, physical therapy, exercise, and other treatments. Learning how to relax and changing negative thought patterns can also help you cope. Chronic pain is complex. Taking an active role in your treatment will help you better manage your pain. Tell your doctor if you have trouble dealing with your pain. You may have to try several things before you find what works best for you. Follow-up care is a key part of your treatment and safety. Be sure to make and go to all appointments, and call your doctor if you are having problems. Its also a good idea to know your test results and keep a list of the medicines you take. How can you care for yourself at home? · Pace yourself. Break up large jobs into smaller tasks. Save harder tasks for days when you have less pain, or go back and forth between hard tasks and easier ones. Take rest breaks. · Relax, and reduce stress. Relaxation techniques such as deep breathing or meditation can help. · Keep moving. Gentle, daily exercise can help reduce pain over the long run. Try low- or no-impact exercises such as walking, swimming, and stationary biking. Do stretches to stay flexible. · Try heat, cold packs, and massage. · Get enough sleep. Chronic pain can make you tired and drain your energy. Talk with your doctor if you have trouble sleeping because of pain. · Think positive. Your thoughts can affect your pain level. Do things that you enjoy to distract yourself when you have pain instead of focusing on the pain. See a movie, read a book, listen to music, or spend time with a friend. · If you think you are depressed, talk to your doctor about treatment. · Keep a daily pain diary. Record how your moods, thoughts, sleep patterns, activities, and medicine affect your pain. You may find that your pain is worse during or after certain activities or when you are feeling a certain emotion. Having a record of your pain can help you and your doctor find the best ways to treat your pain. · Take pain medicines exactly as directed. ¨ If the doctor gave you a prescription medicine for pain, take it as prescribed. ¨ If you are not taking a prescription pain medicine, ask your doctor if you can take an over-the-counter medicine. Reducing constipation caused by pain medicine · Include fruits, vegetables, beans, and whole grains in your diet each day. These foods are high in fiber. · Drink plenty of fluids, enough so that your urine is light yellow or clear like water. If you have kidney, heart, or liver disease and have to limit fluids, talk with your doctor before you increase the amount of fluids you drink. · If your doctor recommends it, get more exercise. Walking is a good choice. Bit by bit, increase the amount you walk every day. Try for at least 30 minutes on most days of the week. · Schedule time each day for a bowel movement. A daily routine may help. Take your time and do not strain when having a bowel movement. When should you call for help? Call your doctor now or seek immediate medical care if: 
· Your pain gets worse or is out of control. · You feel down or blue, or you do not enjoy things like you once did. You may be depressed, which is common in people with chronic pain. Depression can be treated. · You have vomiting or cramps for more than 2 hours. Watch closely for changes in your health, and be sure to contact your doctor if: 
· You cannot sleep because of pain. · You are very worried or anxious about your pain. · You have trouble taking your pain medicine. · You have any concerns about your pain medicine. · You have trouble with bowel movements, such as: 
¨ No bowel movement in 3 days. ¨ Blood in the anal area, in your stool, or on the toilet paper. ¨ Diarrhea for more than 24 hours. Where can you learn more? Go to http://catracho-bryce.info/. Enter N004 in the search box to learn more about \"Chronic Pain: Care Instructions. \" Current as of: February 19, 2016 Content Version: 11.1 © 5052-8839 [employer]. Care instructions adapted under license by [email or web address] (which disclaims liability or warranty for this information). If you have questions about a medical condition or this instruction, always ask your healthcare professional. [address] any warranty or liability for your use of this information. Introducing [facility]! Jasmeet Zafar introduces [employer] patient portal. Now you can access parts of your medical record, email your doctor's office, and request medication refills online. 1. In your internet browser, go to [email or web address] 2. Click on the First Time User? Click Here link in the Sign In box. You will see the New Member Sign Up page. 3. Enter your [employer] Access Code exactly as it appears below.  You will not need to use this code after youve completed the sign-up process. If you do not sign up before the expiration date, you must request a new code. · [employer] Access Code: [email or web address] Expires: 6/11/2017  1:38 PM 
 
4. Enter the last four digits of your Social Security Number (xxxx) and Date of Birth (mm/dd/yyyy) as indicated and click Submit. You will be taken to the next sign-up page. 5. Create a [employer] ID. This will be your [employer] login ID and cannot be changed, so think of one that is secure and easy to remember. 6. Create a [employer] password. You can change your password at any time. 7. Enter your Password Reset Question and Answer. This can be used at a later time if you forget your password. 8. Enter your e-mail address. You will receive e-mail notification when new information is available in [address]. 9. Click Sign Up. You can now view and download portions of your medical record. 10. Click the Download Summary menu link to download a portable copy of your medical information. If you have questions, please visit the Frequently Asked Questions section of the [employer] website. Remember, [employer] is NOT to be used for urgent needs. For medical emergencies, dial 911. Now available from your iPhone and Android! Please provide this summary of care documentation to your next provider. Your primary care clinician is listed as Hafsa Perkins. If you have any questions after today's visit, please call [phone].

## 2017-03-16 NOTE — PROGRESS NOTES
Progress Note    Patient: Rody Kwon MRN: [id number]  SSN: [id number]    YOB: 1955  Age: 59 y.o. Sex: male        Chief Complaint   Patient presents with    Medication Evaluation         Subjective:   Patient presents for presents evaluation of low back problems. Symptoms have been present for several years and include pain in lower back (aching, pulsating, severe, sharp, stabbing, shooting, throbbing in character; 6/10 in severity), weakness in right leg, numbness in right leg. Initial inciting event: none. Symptoms are worst: morning, nighttime. Alleviating factors identifiable by patient are none. Exacerbating factors identifiable by patient are standing, sitting, walking, running, bending forwards, bending backwards, bending sideways. Treatments so far initiated by patient: MRI, pain medications, PT. Previous lower back problems: chronic. Previous workup: MRI, xrays. Previous treatments: PT and medications. Denies any changes in bowel and bladder. Patient was seen at Neurosurgery and is not a candidate for surgery. Patient states that he is going to need pain management. States that he is waiting for his appointment. Cardiovascular Review:  The patient has hyperlipidemia. Diet and Lifestyle: generally follows a low fat low cholesterol diet, exercises regularly, smoker moderate, caffeine intake none, alcohol intake none  Home BP Monitoring: is not measured at home. Pertinent ROS: taking medications as instructed, no medication side effects noted, no TIA's, no chest pain on exertion, no dyspnea on exertion, no swelling of ankles, no orthostatic dizziness or lightheadedness, no orthopnea or paroxysmal nocturnal dyspnea, no palpitations, no muscle aches or pain. Patient states that he is taking his cholesterol medications as prescribed. Denies any issues with the medications at this visit. States that he is still having some problems with dizziness at intervals.  States that he is watching his diet and getting exercise.       Depression  Patient complains of depression. He complains of depressed mood, fatigue, difficulty concentrating and hopelessness. Onset was approximately several months ago, stable since that time. He denies current suicidal and homicidal plan or intent. Family history significant for nothing. Possible organic causes contributing are: Chronic pain. Risk factors: none apparent Previous treatment includes none and no other therapies. He complains of the following side effects from the treatment: none. Patient states that he does have periods of depression, but they are not too bad. States that he is not taking anything at this visit. Encounter Diagnoses   Name Primary?  DDD (degenerative disc disease), lumbar Yes    Spinal stenosis, lumbar     Hyperlipidemia, unspecified hyperlipidemia type     Recurrent major depressive disorder, in partial remission ([facility] [address]     Essential hypertension     Elevated PSA     Weight loss     Perennial allergic rhinitis, unspecified allergic rhinitis trigger     Exercise counseling     Dietary counseling and surveillance     Normal body mass index (BMI)              Current and past medical information:    Current Medications after this visit[de-identified]   Current Outpatient Prescriptions   Medication Sig    tiZANidine (ZANAFLEX) 4 mg tablet Take 1 Tab by mouth every six (6) hours as needed for up to 90 days. Indications: MUSCLE SPASM    HYDROcodone-acetaminophen (NORCO)  mg tablet Take 1 Tab by mouth every six (6) hours as needed for Pain for up to 15 days. Max Daily Amount: 4 Tabs. Indications: Pain    montelukast (SINGULAIR) 10 mg tablet Take 1 Tab by mouth daily for 90 days. Indications: ALLERGIC RHINITIS    pravastatin (PRAVACHOL) 40 mg tablet TAKE ONE TABLET BY MOUTH nightly    traZODone (DESYREL) 150 mg tablet TAKE ONE TABLET BY MOUTH NIGHTLY    diazePAM (VALIUM) 5 mg tablet Take 1 Tab by mouth every eight (8) hours as needed. Max Daily Amount: 15 mg.    hydrocortisone (ANUSOL-HC) 2.5 % topical cream Apply  to affected area two (2) times a day. use thin layer    tamsulosin (FLOMAX) 0.4 mg capsule Take 1 Cap by mouth daily for 30 days. Indications: Urolithiasis     No current facility-administered medications for this visit.         Patient Active Problem List    Diagnosis Date Noted    Depression 10/31/2015    Tobacco abuse 10/31/2015    History of left hip replacement 08/06/2015    Hyperlipemia 01/28/2015    Insomnia 01/28/2015       Past Medical History:   Diagnosis Date    Adverse effect of anesthesia     \"major fear of needles\"/raw feeling down throat    Arthritis     osteo    Chronic obstructive pulmonary disease (HCC)     Chronic pain     lower back - stenosis - had injections/steroids    Hypercholesterolemia     Ill-defined condition     low BP but not a problem    Psychiatric disorder     depression       Allergies   Allergen Reactions    Percocet [Oxycodone-Acetaminophen] Rash and Itching     itching       Past Surgical History:   Procedure Laterality Date    COLONOSCOPY N/A 8/23/2016    COLONOSCOPY performed by Corrinne Royals, [facility] at [facility] ENDOSCOPY    HX COLONOSCOPY      HX GI      surgery for hemorrhoids    HX HEENT      all teeth removed    HX ORTHOPAEDIC      left hip replacement    HX ORTHOPAEDIC      infection after sutured finger/then healed/then had a growth removed 2 1/2 yrs later - benign    HX OTHER SURGICAL      growth on left side of testicle removed - benign       Social History     Social History    Marital status: SINGLE     Spouse name: N/A    Number of children: N/A    Years of education: N/A     Social History Main Topics    Smoking status: Current Every Day Smoker     Packs/day: 0.50     Years: 45.00    Smokeless tobacco: Never Used    Alcohol use No    Drug use: No    Sexual activity: No     Other Topics Concern    None     Social History Narrative       Review of Systems Constitutional: Negative. Negative for chills, diaphoresis, fever, malaise/fatigue and weight loss. HENT: Negative. Negative for congestion and nosebleeds. Eyes: Negative. Negative for blurred vision, double vision and photophobia. Respiratory: Negative. Negative for cough, hemoptysis, sputum production, shortness of breath and wheezing. Cardiovascular: Negative. Negative for chest pain, palpitations, orthopnea, claudication, leg swelling and PND. Gastrointestinal: Negative. Negative for abdominal pain, blood in stool, constipation, diarrhea, heartburn, melena, nausea and vomiting. Hemorrhoids   Genitourinary: Negative. Negative for dysuria, flank pain, frequency, hematuria and urgency. Musculoskeletal: Positive for back pain. Negative for falls, joint pain, myalgias and neck pain. Skin: Negative. Negative for itching and rash. Neurological: Positive for sensory change. Negative for dizziness, tingling, tremors, speech change, focal weakness, seizures, loss of consciousness, weakness and headaches. Endo/Heme/Allergies: Negative. Negative for environmental allergies and polydipsia. Does not bruise/bleed easily. Psychiatric/Behavioral: Negative. Negative for depression, hallucinations, memory loss, substance abuse and suicidal ideas. The patient is not nervous/anxious and does not have insomnia. Objective:     Vitals:    03/13/17 1432   BP: 129/83   Pulse: 61   Resp: 18   Temp: 98.5 °F (36.9 °C)   TempSrc: Oral   SpO2: 98%   Weight: 174 lb (78.9 kg)   Height: 6' (1.829 m)      Body mass index is 23.6 kg/(m^2). Physical Exam   Constitutional: He is oriented to person, place, and time. No distress. HENT:   Head: Normocephalic and atraumatic. Right Ear: External ear normal.   Left Ear: External ear normal.   Nose: Nose normal.   Mouth/Throat: Oropharynx is clear and moist. No oropharyngeal exudate.    Eyes: Conjunctivae and EOM are normal. Pupils are equal, round, and reactive to light. Right eye exhibits no discharge. Left eye exhibits no discharge. No scleral icterus. Neck: Trachea normal and normal range of motion. Neck supple. No JVD present. No tracheal tenderness present. Carotid bruit is not present. No tracheal deviation present. No thyromegaly present. Cardiovascular: Normal rate, regular rhythm, normal heart sounds and intact distal pulses. Exam reveals no gallop and no friction rub. No murmur heard. Pulmonary/Chest: Effort normal and breath sounds normal. No stridor. No respiratory distress. He has no wheezes. He has no rales. He exhibits no tenderness. Abdominal: Soft. Normal appearance, normal aorta and bowel sounds are normal. He exhibits no shifting dullness, no distension, no pulsatile liver, no fluid wave, no abdominal bruit, no ascites, no pulsatile midline mass and no mass. There is no hepatosplenomegaly. There is no tenderness. There is CVA tenderness. There is no rigidity, no rebound, no guarding, no tenderness at McBurney's point and negative Cummins's sign. Genitourinary: Testes/scrotum normal and penis normal. Rectal exam shows external hemorrhoid and tenderness. Prostate is not enlarged and not tender. He exhibits no abnormal testicular mass, no testicular tenderness, no abnormal scrotal mass and no scrotal tenderness. Penis exhibits no lesions and no edema. No discharge found. Genitourinary Comments: Patient has an external hemorrhoid that is not bleeding. Size is approximately 2cm by 3cm. No open lesions. Is swollen and tender to touch. Musculoskeletal: He exhibits tenderness. He exhibits no edema or deformity. Lumbar back: He exhibits decreased range of motion, tenderness, pain and spasm. Paraspinal tenderness is noted on physical exam.  C/O pain with flexion and hyperextension of the back. C/O pain with lateral side-bending. C/O pain with rotation. SLR are positive on the right.      Lymphadenopathy:     He has no cervical adenopathy. Neurological: He is alert and oriented to person, place, and time. He has normal reflexes. He displays normal reflexes. No cranial nerve deficit. He exhibits normal muscle tone. Gait normal. Coordination normal. GCS score is 15. Skin: Skin is warm and dry. No rash noted. He is not diaphoretic. No erythema. No pallor. Psychiatric: Mood, memory, affect and judgment normal.   Nursing note and vitals reviewed. Health Maintenance Due   Topic Date Due    Pneumococcal 19-64 Medium Risk (1 of 1 - PPSV23) 12/15/1972    DTaP/Tdap/Td series (1 - Tdap) 12/15/1974    ZOSTER VACCINE AGE 60>  12/15/2013         Assessment and orders:       ICD-10-CM ICD-9-CM    1. DDD (degenerative disc disease), lumbar M51.36 722.52 Patient's last MRI was approximately 5 years ago. Will see if we can obtain another to see if there has been any changes. Is having an increase in pain. Will start on a hydrocodone in the morning only. Patient is in agreement with treatment plan at this time. Information printed and given to the patient for review. tiZANidine (ZANAFLEX) 4 mg tablet      MRI LUMB SPINE WO CONT      [id number] 9+OXYCODONE+CRT-UNBUND   2. Spinal stenosis, lumbar M48.06 724.02 tiZANidine (ZANAFLEX) 4 mg tablet   3. Hyperlipidemia, unspecified hyperlipidemia type E78.5 272.4 The patient is aware of our goal to reduce or eliminate the long term problems (such as strokes and heart attacks) related to poorly controlled hyperlipidemia. Discussion about strict diet modification along with as much exercise as possible. Information printed and given to the patient for review. CBC WITH AUTOMATED DIFF   4. Recurrent major depressive disorder, in partial remission ([facility] [address] F33.41 296.35 Stable at this visit. Patient denies SI/HI at this visit. 5. Essential hypertension N48 619.4 METABOLIC PANEL, COMPREHENSIVE   6. Elevated PSA R97.20 790.93 PSA W/ REFLX FREE PSA   7.  Weight loss R63.4 783.21 RPR      HIV 1/2 AG/AB, 4TH GENERATION,W RFLX CONFIRM   8. Perennial allergic rhinitis, unspecified allergic rhinitis trigger J30.89 477.8 montelukast (SINGULAIR) 10 mg tablet   9. Exercise counseling Z71.89 V65.41 Physical activity information given to patient and printed for review. 10. Dietary counseling and surveillance Z71.3 V65.3 Dietary information discussed with patient and printed for review. 11. Normal body mass index (BMI) Z78.9 V49.89          Plan of care:  Discussed diagnoses in detail with patient. Medication risks/benefits/side effects discussed with patient. All of the patient's questions were addressed. The patient understands and agrees with our plan of care. The patient knows to call back if they are unsure of or forget any changes we discussed today or if the symptoms change. The patient received an After-Visit Summary which contains VS, orders, medication list and allergy list. This can be used as a \"mini-medical record\" should they have to seek medical care while out of town. Patient Care Team:  Kashif Roberson MD as PCP - General (Family Practice)  Michelle Nettles MD (Orthopedic Surgery)  Jarrod Berger MD (Neurosurgery)    Follow-up Disposition:  Return in about 4 weeks (around 4/10/2017), or if symptoms worsen or fail to improve.     Future Appointments  Date Time Provider Marzena Chopra   3/23/2017 7:00 AM [facility]CHIATR[facility] [place] MRI [address]   7/31/2017 9:00 AM Kashif Roberson MD [facility] [address] [facility]       Signed By: Lorraine Orozco NP     March 16, 2017

## 2017-03-16 NOTE — PROGRESS NOTES
Labs look good for this patient. PSA is back to normal.   CBC is stable. Sodium is slightly elevated, but feel that this is some dehydration. HIV and RPR is negative.

## 2017-03-21 LAB
ALBUMIN SERPL-MCNC: 4.6 G/DL (ref 3.6–4.8)
ALBUMIN/GLOB SERPL: 1.9 {RATIO} (ref 1.2–2.2)
ALP SERPL-CCNC: 86 IU/L (ref 39–117)
ALPRAZ UR QL: NEGATIVE
ALT SERPL-CCNC: 19 IU/L (ref 0–44)
AMPHETAMINES UR QL SCN: NEGATIVE NG/ML
AST SERPL-CCNC: 21 IU/L (ref 0–40)
BARBITURATES UR QL SCN: NEGATIVE NG/ML
BASOPHILS # BLD AUTO: 0.1 X10E3/UL (ref 0–0.2)
BASOPHILS NFR BLD AUTO: 1 %
BENZODIAZ UR QL SCN: NORMAL NG/ML
BENZODIAZ UR QL: POSITIVE NG/ML
BILIRUB SERPL-MCNC: 0.5 MG/DL (ref 0–1.2)
BUN SERPL-MCNC: 11 MG/DL (ref 8–27)
BUN/CREAT SERPL: 11 (ref 10–22)
BZE UR QL SCN: NEGATIVE NG/ML
CALCIUM SERPL-MCNC: 9.7 MG/DL (ref 8.6–10.2)
CANNABINOIDS UR QL CFM: POSITIVE
CANNABINOIDS UR QL SCN: NORMAL NG/ML
CHLORIDE SERPL-SCNC: 108 MMOL/L (ref 96–106)
CLONAZEPAM UR QL: NEGATIVE
CO2 SERPL-SCNC: 16 MMOL/L (ref 18–29)
CREAT SERPL-MCNC: 0.98 MG/DL (ref 0.76–1.27)
CREAT UR-MCNC: 187.5 MG/DL (ref 20–300)
EOSINOPHIL # BLD AUTO: 0.2 X10E3/UL (ref 0–0.4)
EOSINOPHIL NFR BLD AUTO: 2 %
ERYTHROCYTE [DISTWIDTH] IN BLOOD BY AUTOMATED COUNT: 14.1 % (ref 12.3–15.4)
FLURAZEPAM UR QL: NEGATIVE
GLOBULIN SER CALC-MCNC: 2.4 G/DL (ref 1.5–4.5)
GLUCOSE SERPL-MCNC: 81 MG/DL (ref 65–99)
HCT VFR BLD AUTO: 44.6 % (ref 37.5–51)
HGB BLD-MCNC: 15 G/DL (ref 12.6–17.7)
HIV 1+2 AB+HIV1 P24 AG SERPL QL IA: NON REACTIVE
IMM GRANULOCYTES # BLD: 0 X10E3/UL (ref 0–0.1)
IMM GRANULOCYTES NFR BLD: 0 %
LORAZEPAM UR QL: NEGATIVE
LYMPHOCYTES # BLD AUTO: 3.8 X10E3/UL (ref 0.7–3.1)
LYMPHOCYTES NFR BLD AUTO: 33 %
MCH RBC QN AUTO: 29.8 PG (ref 26.6–33)
MCHC RBC AUTO-ENTMCNC: 33.6 G/DL (ref 31.5–35.7)
MCV RBC AUTO: 89 FL (ref 79–97)
METHADONE UR QL SCN: NEGATIVE NG/ML
MIDAZOLAM UR QL CFM: NEGATIVE
MONOCYTES # BLD AUTO: 0.8 X10E3/UL (ref 0.1–0.9)
MONOCYTES NFR BLD AUTO: 7 %
NEUTROPHILS # BLD AUTO: 6.8 X10E3/UL (ref 1.4–7)
NEUTROPHILS NFR BLD AUTO: 57 %
NORDIAZEPAM UR QL: NEGATIVE
OPIATES UR QL SCN: NEGATIVE NG/ML
OXAZEPAM UR CFM-MCNC: 433 NG/ML
OXAZEPAM UR QL: POSITIVE
OXYCODONE+OXYMORPHONE UR QL SCN: NEGATIVE NG/ML
PCP UR QL: NEGATIVE NG/ML
PH UR: 5.2 [PH] (ref 4.5–8.9)
PLATELET # BLD AUTO: 332 X10E3/UL (ref 150–379)
PLEASE NOTE:, 733157: NORMAL
POTASSIUM SERPL-SCNC: 4.9 MMOL/L (ref 3.5–5.2)
PROPOXYPH UR QL SCN: NEGATIVE NG/ML
PROT SERPL-MCNC: 7 G/DL (ref 6–8.5)
PSA SERPL-MCNC: 3 NG/ML (ref 0–4)
RBC # BLD AUTO: 5.03 X10E6/UL (ref 4.14–5.8)
REFLEX CRITERIA: NORMAL
RPR SER QL: NON REACTIVE
SODIUM SERPL-SCNC: 147 MMOL/L (ref 134–144)
TEMAZEPAM UR CFM-MCNC: 162 NG/ML
TEMAZEPAM UR QL CFM: POSITIVE
THC UR CFM-MCNC: >300 NG/ML
TRIAZOLAM UR QL: NEGATIVE
WBC # BLD AUTO: 11.7 X10E3/UL (ref 3.4–10.8)

## 2017-03-23 ENCOUNTER — TELEPHONE (OUTPATIENT)
Dept: FAMILY MEDICINE CLINIC | Age: 62
End: 2017-03-23

## 2017-03-23 ENCOUNTER — HOSPITAL ENCOUNTER (OUTPATIENT)
Dept: MRI IMAGING | Age: 62
Discharge: HOME | End: 2017-03-23
Attending: NURSE PRACTITIONER
Payer: MEDICARE

## 2017-03-23 DIAGNOSIS — M51.36 DDD (DEGENERATIVE DISC DISEASE), LUMBAR: ICD-10-CM

## 2017-03-23 PROCEDURE — 72148 MRI LUMBAR SPINE W/O DYE: CPT

## 2017-03-23 NOTE — PROGRESS NOTES
Called and spoke with patient about his results of his MRI. Will refer to ortho for further evaluation and possible treatment. Patient is seeing pain management and he would like to see what they have to say prior to referral being completed.

## 2017-03-24 NOTE — TELEPHONE ENCOUNTER
Called patient. Patient was seen by pain management and was told to have a couple of injections. Patient is going to have them done, along with keeping his referral to ortho.

## 2017-04-11 ENCOUNTER — OFFICE VISIT (OUTPATIENT)
Dept: FAMILY MEDICINE CLINIC | Age: 62
End: 2017-04-11

## 2017-04-11 VITALS
RESPIRATION RATE: 18 BRPM | TEMPERATURE: 98.2 F | SYSTOLIC BLOOD PRESSURE: 102 MMHG | BODY MASS INDEX: 23.03 KG/M2 | WEIGHT: 170 LBS | HEART RATE: 75 BPM | OXYGEN SATURATION: 96 % | HEIGHT: 72 IN | DIASTOLIC BLOOD PRESSURE: 64 MMHG

## 2017-04-11 DIAGNOSIS — Z72.0 TOBACCO ABUSE: ICD-10-CM

## 2017-04-11 DIAGNOSIS — M25.50 ARTHRALGIA OF MULTIPLE SITES: ICD-10-CM

## 2017-04-11 DIAGNOSIS — M51.36 DEGENERATIVE DISC DISEASE, LUMBAR: Primary | ICD-10-CM

## 2017-04-11 RX ORDER — TAMSULOSIN HYDROCHLORIDE 0.4 MG/1
CAPSULE ORAL
Refills: 3 | COMMUNITY
Start: 2017-03-29 | End: 2017-05-03 | Stop reason: SDUPTHER

## 2017-04-11 NOTE — MR AVS SNAPSHOT
Visit Information Date & Time Provider Department Dept. Phone Encounter #  
 4/11/2017 10:40 AM Fernanda Herr MD [address] [place], [place] [address] [id number] Follow-up Instructions Return in about 3 months (around 7/11/2017). Your Appointments 7/31/2017  9:00 AM  
ROUTINE CARE with Fernanda Herr MD  
[address]) Appt Note: 6 mnth fu est pt for depression [address] [address]  
[place] [address] [place] [id number] Upcoming Health Maintenance Date Due Pneumococcal 19-64 Medium Risk (1 of 1 - PPSV23) 12/15/1972 DTaP/Tdap/Td series (1 - Tdap) 12/15/1974 ZOSTER VACCINE AGE 60> 12/15/2013 FOBT Q 1 YEAR AGE 50-75 11/17/2017 Allergies as of 4/11/2017  Review Complete On: 4/11/2017 By: Fernanda Herr MD  
  
 Severity Noted Reaction Type Reactions Percocet [Oxycodone-acetaminophen]  01/28/2015    Rash, Itching  
 itching Current Immunizations  Never Reviewed No immunizations on file. Not reviewed this visit You Were Diagnosed With   
  
 Codes Comments Degenerative disc disease, lumbar    -  Primary ICD-10-CM: M51.36 
ICD-9-CM: 722.52 Arthralgia of multiple sites     ICD-10-CM: M25.50 ICD-9-CM: 719.49 Vitals BP Pulse Temp Resp Height(growth percentile) Weight(growth percentile) 102/64 (BP 1 Location: Left arm, BP Patient Position: Sitting) 75 98.2 °F (36.8 °C) (Oral) 18 6' (1.829 m) 170 lb (77.1 kg) SpO2 BMI Smoking Status 96% 23.06 kg/m2 Current Every Day Smoker Vitals History BMI and BSA Data Body Mass Index Body Surface Area 23.06 kg/m 2 1.98 m 2 Preferred Pharmacy Pharmacy Name Phone [facility] [address] [phone] Your Updated Medication List  
  
   
 This list is accurate as of: 4/11/17 11:31 AM.  Always use your most recent med list.  
  
  
  
  
 diazePAM 5 mg tablet Commonly known as:  VALIUM Take 1 Tab by mouth every eight (8) hours as needed. Max Daily Amount: 15 mg.  
  
 hydrocortisone 2.5 % topical cream  
Commonly known as:  ANUSOL-HC Apply  to affected area two (2) times a day. use thin layer  
  
 montelukast 10 mg tablet Commonly known as:  SINGULAIR Take 1 Tab by mouth daily for 90 days. Indications: ALLERGIC RHINITIS  
  
 pravastatin 40 mg tablet Commonly known as:  PRAVACHOL  
TAKE ONE TABLET BY MOUTH nightly  
  
 tamsulosin 0.4 mg capsule Commonly known as:  FLOMAX TAKE ONE CAPSULE BY MOUTH EVERY DAY  
  
 tiZANidine 4 mg tablet Commonly known as:  Pasty Yoselin Take 1 Tab by mouth every six (6) hours as needed for up to 90 days. Indications: MUSCLE SPASM  
  
 traZODone 150 mg tablet Commonly known as:  DESYREL  
TAKE ONE TABLET BY MOUTH NIGHTLY We Performed the Following CHACORTA W/REFLEX [99554 CPT(R)] REFERRAL TO ORTHOPEDIC SURGERY [REF62 Custom] Comments:  
 Patient with moderate to severe lumbar degenerative disc disease. RHEUMATOID FACTOR, QL [id number] CPT(R)] SED RATE (ESR) [id number] CPT(R)] Follow-up Instructions Return in about 3 months (around 7/11/2017). Referral Information Referral ID Referred By Referred To  
  
 [id number] Cristian RAMIREZ Not Available Visits Status Start Date End Date 1 New Request 4/11/17 4/11/18 If your referral has a status of pending review or denied, additional information will be sent to support the outcome of this decision. Introducing \[facility]\"" & HEALTH SERVICES! Stacey Álvarez introduces [employer] patient portal. Now you can access parts of your medical record, email your doctor's office, and request medication refills online. 1. In your internet browser, go to [email or web address] 2. Click on the First Time User? Click Here link in the Sign In box. You will see the New Member Sign Up page. 3. Enter your [employer] Access Code exactly as it appears below. You will not need to use this code after youve completed the sign-up process. If you do not sign up before the expiration date, you must request a new code. · [employer] Access Code: [email or web address] Expires: 6/11/2017  1:38 PM 
 
4. Enter the last four digits of your Social Security Number (xxxx) and Date of Birth (mm/dd/yyyy) as indicated and click Submit. You will be taken to the next sign-up page. 5. Create a [employer] ID. This will be your [employer] login ID and cannot be changed, so think of one that is secure and easy to remember. 6. Create a [employer] password. You can change your password at any time. 7. Enter your Password Reset Question and Answer. This can be used at a later time if you forget your password. 8. Enter your e-mail address. You will receive e-mail notification when new information is available in [address] 9. Click Sign Up. You can now view and download portions of your medical record. 10. Click the Download Summary menu link to download a portable copy of your medical information. If you have questions, please visit the Frequently Asked Questions section of the [employer] website. Remember, [employer] is NOT to be used for urgent needs. For medical emergencies, dial 911. Now available from your iPhone and Android! Please provide this summary of care documentation to your next provider. Your primary care clinician is listed as Ashley Garg. If you have any questions after today's visit, please call [phone].

## 2017-04-11 NOTE — PROGRESS NOTES
Chief Complaint   Patient presents with    Lumbar Pain     4 week follow-up, needs ortho referral     he is a 59y.o. year old male who presents for follow up. Has recently been followed by NP. He has hx of total hip replacement and chronic LBP. Patient has previously been seen by ortho, neurology and pain management. He says lumbar back pain improved with recent lumbar injections. Patient  Has had an MRI of the back and is wanting another referral to ortho for back. He continues to smoke. BP Readings from Last 3 Encounters:   04/11/17 102/64   03/13/17 129/83   01/30/17 126/73     Wt Readings from Last 3 Encounters:   04/11/17 170 lb (77.1 kg)   03/13/17 174 lb (78.9 kg)   01/30/17 177 lb 6.4 oz (80.5 kg)     Body mass index is 23.06 kg/(m^2). Hyperlipidemia    Cardiovascular risks for him are: LDL goal is under 100  hyperlipidemia  smoker. Currently he takes Pravachol (pravastatin)   Lab Results   Component Value Date/Time    Cholesterol, total 173 11/17/2016 11:26 AM    HDL Cholesterol 35 11/17/2016 11:26 AM    LDL, calculated 118 11/17/2016 11:26 AM    Triglyceride 100 11/17/2016 11:26 AM     Lab Results   Component Value Date/Time    ALT (SGPT) 19 03/13/2017 04:04 PM    AST (SGOT) 21 03/13/2017 04:04 PM    Alk.  phosphatase 86 03/13/2017 04:04 PM    Bilirubin, total 0.5 03/13/2017 04:04 PM     Our goal is to lower hyperlipidemia to decrease the risks of strokes and heart attacks      Patient Active Problem List   Diagnosis Code    Hyperlipemia E78.5    Insomnia G47.00    History of left hip replacement Z96.642    Depression F32.9    Tobacco abuse Z72.0     Past Surgical History:   Procedure Laterality Date    COLONOSCOPY N/A 8/23/2016    COLONOSCOPY performed by Carter Lopez MD at [address] HX COLONOSCOPY      HX GI      surgery for hemorrhoids    HX HEENT      all teeth removed    HX ORTHOPAEDIC      left hip replacement    HX ORTHOPAEDIC      infection after sutured finger/then healed/then had a growth removed 2 1/2 yrs later - benign    HX OTHER SURGICAL      growth on left side of testicle removed - benign     Social History     Social History    Marital status: SINGLE     Spouse name: N/A    Number of children: N/A    Years of education: N/A     Occupational History    Not on file. Social History Main Topics    Smoking status: Current Every Day Smoker     Packs/day: 0.50     Years: 45.00    Smokeless tobacco: Never Used    Alcohol use No    Drug use: No    Sexual activity: No     Other Topics Concern    Not on file     Social History Narrative     Family History   Problem Relation Age of Onset    Cancer Mother      breast    Stroke Mother      x2    Cancer Father      throat/lung    Heart Disease Father     Lung Disease Father     Thyroid Disease Father     Heart Attack Father     Suicide Brother     Psychiatric Disorder Brother     Heart Attack Paternal Uncle     Heart Attack Paternal Uncle      x3     Current Outpatient Prescriptions   Medication Sig    tamsulosin (FLOMAX) 0.4 mg capsule TAKE ONE CAPSULE BY MOUTH EVERY DAY    montelukast (SINGULAIR) 10 mg tablet Take 1 Tab by mouth daily for 90 days. Indications: ALLERGIC RHINITIS    pravastatin (PRAVACHOL) 40 mg tablet TAKE ONE TABLET BY MOUTH nightly    traZODone (DESYREL) 150 mg tablet TAKE ONE TABLET BY MOUTH NIGHTLY    diazePAM (VALIUM) 5 mg tablet Take 1 Tab by mouth every eight (8) hours as needed. Max Daily Amount: 15 mg.    hydrocortisone (ANUSOL-HC) 2.5 % topical cream Apply  to affected area two (2) times a day. use thin layer    tiZANidine (ZANAFLEX) 4 mg tablet Take 1 Tab by mouth every six (6) hours as needed for up to 90 days. Indications: MUSCLE SPASM     No current facility-administered medications for this visit.       Allergies   Allergen Reactions    Percocet [Oxycodone-Acetaminophen] Rash and Itching     itching       Review of Systems:  Constitutional: Negative for fatigue, malaise  Resp: Negative for cough, wheezing or SOB  CV: Negative for chest pain, dizziness or palpitations  MS: see HPI  Neuro: Negative for HA, weakness or paresthesia  Psych: History of depression, insomnia     Vitals:    04/11/17 1036   BP: 102/64   Pulse: 75   Resp: 18   Temp: 98.2 °F (36.8 °C)   TempSrc: Oral   SpO2: 96%   Weight: 170 lb (77.1 kg)   Height: 6' (1.829 m)       Physical Examination:  General: thin, NAD  Head: Normocephalic, atraumatic  Eyes: Sclera clear, EOMI  Neck: Normal range of motion  Respiratory: symmetrical, unlabored effort  Cardiovascular: Regular rate and rhythm  Extremities:  antalgic gait,  Neurologic: No focal deficits  Psych: affect appropriate    Cb July was seen today for lumbar pain. Diagnoses and all orders for this visit:    Degenerative disc disease, lumbar  -     REFERRAL TO ORTHOPEDIC SURGERY    Arthralgia of multiple sites  -     RHEUMATOID FACTOR, QL  -     SED RATE (ESR)  -     CHACORTA W/REFLEX    Advised patient to stop all tobacco use. I have discussed the diagnosis with the patient and the intended plan as seen in the above orders. The patient expresses understanding and agreement with our plan of care. The patient has received an after-visit summary. The patient knows to call our office if there are any questions or concerns regarding diagnosis and treatment plans. I have discussed medication side effects and warnings with the patient as well. Follow-up Disposition:  Return in about 3 months (around 7/11/2017).

## 2017-04-11 NOTE — PROGRESS NOTES
Reviewed record in preparation for visit and have necessary documentation  Pt did not bring medication to office visit for review  Opportunity was given for questions  Goals that were addressed and/or need to be completed after this appointment include   Health Maintenance Due   Topic Date Due    Pneumococcal 19-64 Medium Risk (1 of 1 - PPSV23) 12/15/1972    DTaP/Tdap/Td series (1 - Tdap) 12/15/1974    ZOSTER VACCINE AGE 60>  12/15/2013

## 2017-05-03 RX ORDER — TAMSULOSIN HYDROCHLORIDE 0.4 MG/1
CAPSULE ORAL
Qty: 90 CAP | Refills: 1 | Status: SHIPPED | OUTPATIENT
Start: 2017-05-03 | End: 2017-09-28 | Stop reason: SDUPTHER

## 2017-05-03 NOTE — TELEPHONE ENCOUNTER
Mr Addie Duque states need new rx for his Flomax, please send into [facility] for he will be there around 3pm today to  med  Mr Addie Duque can also be reached at [phone]

## 2017-06-02 RX ORDER — TRAZODONE HYDROCHLORIDE 150 MG/1
TABLET ORAL
Qty: 30 TAB | Refills: 5 | Status: SHIPPED | OUTPATIENT
Start: 2017-06-02 | End: 2017-10-30 | Stop reason: SDUPTHER

## 2017-06-02 RX ORDER — MONTELUKAST SODIUM 10 MG/1
10 TABLET ORAL DAILY
Qty: 30 TAB | Refills: 5 | Status: SHIPPED | OUTPATIENT
Start: 2017-06-02 | End: 2017-10-30 | Stop reason: SDUPTHER

## 2017-06-02 RX ORDER — PRAVASTATIN SODIUM 40 MG/1
TABLET ORAL
Qty: 30 TAB | Refills: 5 | Status: SHIPPED | OUTPATIENT
Start: 2017-06-02 | End: 2017-10-30 | Stop reason: SDUPTHER

## 2017-06-02 NOTE — TELEPHONE ENCOUNTER
Pt states these two medication were suppose to be with the other medication he received on May 3rd. I did not see it. Anyway we can get it filled at [address] today? He states the Pharmacy says they have been e-scribing us.

## 2017-06-29 ENCOUNTER — TELEPHONE (OUTPATIENT)
Dept: FAMILY MEDICINE CLINIC | Age: 62
End: 2017-06-29

## 2017-06-29 NOTE — TELEPHONE ENCOUNTER
Pt woke up and this morning he could not hear out of his left ear. It happened one time twenty years ago and they had to pull a plug out of his ear. Can you check this for him? Please call him.  thanks

## 2017-06-30 NOTE — TELEPHONE ENCOUNTER
Patient called back. Spoke with Pimentel Ates. Advised we had no appointments available. Offered urgent care.

## 2017-07-21 ENCOUNTER — OFFICE VISIT (OUTPATIENT)
Dept: FAMILY MEDICINE CLINIC | Age: 62
End: 2017-07-21

## 2017-07-21 VITALS
BODY MASS INDEX: 21.81 KG/M2 | SYSTOLIC BLOOD PRESSURE: 116 MMHG | WEIGHT: 161 LBS | HEART RATE: 64 BPM | TEMPERATURE: 97.8 F | DIASTOLIC BLOOD PRESSURE: 68 MMHG | OXYGEN SATURATION: 96 % | RESPIRATION RATE: 16 BRPM | HEIGHT: 72 IN
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
## 2017-07-21 DIAGNOSIS — F51.01 PRIMARY INSOMNIA: ICD-10-CM

## 2017-07-21 DIAGNOSIS — L82.1 SEBORRHEIC KERATOSES: ICD-10-CM

## 2017-07-21 DIAGNOSIS — Z72.0 TOBACCO ABUSE: ICD-10-CM

## 2017-07-21 DIAGNOSIS — E78.5 HYPERLIPIDEMIA, UNSPECIFIED HYPERLIPIDEMIA TYPE: Primary | ICD-10-CM

## 2017-07-21 DIAGNOSIS — M25.50 ARTHRALGIA, UNSPECIFIED JOINT: ICD-10-CM

## 2017-07-21 DIAGNOSIS — F32.A DEPRESSION, UNSPECIFIED DEPRESSION TYPE: ICD-10-CM

## 2017-07-21 DIAGNOSIS — H61.22 IMPACTED CERUMEN OF LEFT EAR: ICD-10-CM

## 2017-07-21 NOTE — PATIENT INSTRUCTIONS
Seborrheic Keratosis: Care Instructions  Your Care Instructions  Seborrheic keratoses are raised skin growths that look scaly or warty. They usually look like they were stuck onto the skin. They most often grow in groups on the back or chest and are more common in older people. A seborrheic keratosis can be tan or dark brown. A seborrheic keratosis is not a mole and is almost always harmless. But it is still a good idea to check your skin regularly. Sometimes a seborrheic keratosis can itch. Scratching it can cause it to bleed and sometimes even scar. A seborrheic keratosis is removed only if it bothers you. The doctor will freeze it or scrape it off with a tool. The doctor can also use a laser to remove a seborrheic keratosis. Treatment usually results in normal-looking skin, but it can leave a light or dark jude or even a scar on the skin. Follow-up care is a key part of your treatment and safety. Be sure to make and go to all appointments, and call your doctor if you are having problems. It's also a good idea to know your test results and keep a list of the medicines you take. How can you care for yourself at home? · If clothing irritates your seborrheic keratosis, cover it with a bandage to prevent rubbing and bleeding. · If you have a seborrheic keratosis removed, clean the area with soap and water two times a day unless your doctor gives you different instructions. Don't use hydrogen peroxide or alcohol, which can slow healing. ¨ You may cover the wound with a thin layer of petroleum jelly, such as Vaseline, and a nonstick bandage. · Check all the skin on your body once a month for skin growths or other changes, such as color and feel of the skin. ¨  front of a full-length mirror. Look carefully at the front and back of your body. Then look at your right and left sides with your arms raised.   JUAQUIN [facility] your elbows and look carefully at your forearms, the back of your upper arms, and your palms.  ¨ Look at your feet, the soles of your feet, and the spaces between your toes. ¨ Use a hand mirror to look at the back of your legs, the back of your neck, and your back, rear end (buttocks), and genital area. Part the hair on your head to look at your scalp. · If you see a change in a skin growth, contact your doctor. Look for:  ¨ A mole that bleeds. ¨ A fast-growing mole. ¨ A scaly or crusted growth on the skin. ¨ A sore that will not heal.  When should you call for help? Call your doctor now or seek immediate medical care if:  · You have an area of normal skin that suddenly changes in shape, size, or how it looks. · Your skin is badly broken from scratching. · You have signs of infection such as:  ¨ Pain, warmth, or swelling in your skin. ¨ Red streaks near a wound in your skin. ¨ Pus coming from a wound in your skin. ¨ A fever not due to the flu or other illness. Watch closely for changes in your health, and be sure to contact your doctor if:  · You do not get better as expected. Where can you learn more? Go to http://catracho-bryce.info/. Enter Y078 in the search box to learn more about \"Seborrheic Keratosis: Care Instructions. \"  Current as of: October 13, 2016  Content Version: 11.3  © 6786-4351 [employer]. Care instructions adapted under license by [employer] (which disclaims liability or warranty for this information). If you have questions about a medical condition or this instruction, always ask your healthcare professional. [address] any warranty or liability for your use of this information.

## 2017-07-21 NOTE — PROGRESS NOTES
Reviewed record in preparation for visit and have necessary documentation  Pt did not bring medication to office visit for review  Information was given to pt on Advanced Directives, Living Will  Information was given on Shingles Vaccine  Opportunity was given for questions  Goals that were addressed and/or need to be completed after this appointment include \  Health Maintenance Due   Topic Date Due    Pneumococcal 19-64 Medium Risk (1 of 1 - PPSV23) 12/15/1972    DTaP/Tdap/Td series (1 - Tdap) 12/15/1974    ZOSTER VACCINE AGE 60>  10/15/2013

## 2017-07-21 NOTE — MR AVS SNAPSHOT
Visit Information Date & Time Provider Department Dept. Phone Encounter #  
 7/21/2017  8:40 AM Tyson Guillaume MD [facility] [id number] Upcoming Health Maintenance Date Due Pneumococcal 19-64 Medium Risk (1 of 1 - PPSV23) 12/15/1972 DTaP/Tdap/Td series (1 - Tdap) 12/15/1974 ZOSTER VACCINE AGE 60> 10/15/2013 INFLUENZA AGE 9 TO ADULT 8/1/2017 FOBT Q 1 YEAR AGE 50-75 11/17/2017 Allergies as of 7/21/2017  Review Complete On: 7/21/2017 By: Tyson Guillaume MD  
  
 Severity Noted Reaction Type Reactions Percocet [Oxycodone-acetaminophen]  01/28/2015    Rash, Itching  
 itching Current Immunizations  Never Reviewed No immunizations on file. Not reviewed this visit You Were Diagnosed With   
  
 Codes Comments Hyperlipidemia, unspecified hyperlipidemia type    -  Primary ICD-10-CM: E78.5 ICD-9-CM: 272.4 Depression, unspecified depression type     ICD-10-CM: F32.9 ICD-9-CM: 241 Primary insomnia     ICD-10-CM: F51.01 
ICD-9-CM: 307.42 Seborrheic keratoses     ICD-10-CM: L82.1 ICD-9-CM: 702.19 Arthralgia, unspecified joint     ICD-10-CM: M25.50 ICD-9-CM: 719.40 Tick bite, subsequent encounter     ICD-10-CM: W57. [place] ICD-9-CM: E906.4 Tobacco abuse     ICD-10-CM: Z72.0 ICD-9-CM: 305.1 Vitals BP Pulse Temp Resp Height(growth percentile) SpO2  
 116/68 64 97.8 °F (36.6 °C) (Oral) 16 6' (1.829 m) 96% Smoking Status Current Every Day Smoker Vitals History Preferred Pharmacy Pharmacy Name Phone [facility] [address] [phone] Your Updated Medication List  
  
   
This list is accurate as of: 7/21/17  9:15 AM.  Always use your most recent med list.  
  
  
  
  
 diazePAM 5 mg tablet Commonly known as:  VALIUM  
 Take 1 Tab by mouth every eight (8) hours as needed. Max Daily Amount: 15 mg.  
  
 hydrocortisone 2.5 % topical cream  
Commonly known as:  ANUSOL-HC Apply  to affected area two (2) times a day. use thin layer  
  
 montelukast 10 mg tablet Commonly known as:  SINGULAIR Take 1 Tab by mouth daily for 90 days. Indications: ALLERGIC RHINITIS  
  
 pravastatin 40 mg tablet Commonly known as:  PRAVACHOL  
TAKE ONE TABLET BY MOUTH nightly  
  
 tamsulosin 0.4 mg capsule Commonly known as:  FLOMAX TAKE ONE CAPSULE BY MOUTH EVERY DAY  
  
 traZODone 150 mg tablet Commonly known as:  DESYREL  
TAKE ONE TABLET BY MOUTH NIGHTLY We Performed the Following CHACORTA BY MULTIPLEX FLOW IA, QL [82956 CPT(R)] LIPID PANEL [65381 CPT(R)] METABOLIC PANEL, COMPREHENSIVE [83056 CPT(R)] RHEUMATOID FACTOR, QL [id number] CPT(R)] SED RATE (ESR) G093234 CPT(R)] To-Do List   
 07/21/2017 Lab:  LYME AB TOTAL W/RFLX W BLOT Patient Instructions Seborrheic Keratosis: Care Instructions Your Care Instructions Seborrheic keratoses are raised skin growths that look scaly or warty. They usually look like they were stuck onto the skin. They most often grow in groups on the back or chest and are more common in older people. A seborrheic keratosis can be tan or dark brown. A seborrheic keratosis is not a mole and is almost always harmless. But it is still a good idea to check your skin regularly. Sometimes a seborrheic keratosis can itch. Scratching it can cause it to bleed and sometimes even scar. A seborrheic keratosis is removed only if it bothers you. The doctor will freeze it or scrape it off with a tool. The doctor can also use a laser to remove a seborrheic keratosis. Treatment usually results in normal-looking skin, but it can leave a light or dark jude or even a scar on the skin. Follow-up care is a key part of your treatment and safety.  Be sure to make and go to all appointments, and call your doctor if you are having problems. It's also a good idea to know your test results and keep a list of the medicines you take. How can you care for yourself at home? · If clothing irritates your seborrheic keratosis, cover it with a bandage to prevent rubbing and bleeding. · If you have a seborrheic keratosis removed, clean the area with soap and water two times a day unless your doctor gives you different instructions. Don't use hydrogen peroxide or alcohol, which can slow healing. ¨ You may cover the wound with a thin layer of petroleum jelly, such as Vaseline, and a nonstick bandage. · Check all the skin on your body once a month for skin growths or other changes, such as color and feel of the skin. ¨  front of a full-length mirror. Look carefully at the front and back of your body. Then look at your right and left sides with your arms raised. JUAQUIN [facility] your elbows and look carefully at your forearms, the back of your upper arms, and your palms. ¨ Look at your feet, the soles of your feet, and the spaces between your toes. ¨ Use a hand mirror to look at the back of your legs, the back of your neck, and your back, rear end (buttocks), and genital area. Part the hair on your head to look at your scalp. · If you see a change in a skin growth, contact your doctor. Look for: ¨ A mole that bleeds. ¨ A fast-growing mole. ¨ A scaly or crusted growth on the skin. ¨ A sore that will not heal. 
When should you call for help? Call your doctor now or seek immediate medical care if: 
· You have an area of normal skin that suddenly changes in shape, size, or how it looks. · Your skin is badly broken from scratching. · You have signs of infection such as: 
¨ Pain, warmth, or swelling in your skin. ¨ Red streaks near a wound in your skin. ¨ Pus coming from a wound in your skin. ¨ A fever not due to the flu or other illness. Watch closely for changes in your health, and be sure to contact your doctor if: 
· You do not get better as expected. Where can you learn more? Go to http://catracho-bryce.info/. Enter S055 in the search box to learn more about \"Seborrheic Keratosis: Care Instructions. \" Current as of: October 13, 2016 Content Version: 11.3 © 0628-2411 [email or web address]. Care instructions adapted under license by [employer] (which disclaims liability or warranty for this information). If you have questions about a medical condition or this instruction, always ask your healthcare professional. [place] [address] any warranty or liability for your use of this information. Introducing [facility]! Daniela Dhaliwal introduces [employer] patient portal. Now you can access parts of your medical record, email your doctor's office, and request medication refills online. 1. In your internet browser, go to [email or web address] 2. Click on the First Time User? Click Here link in the Sign In box. You will see the New Member Sign Up page. 3. Enter your [employer] Access Code exactly as it appears below. You will not need to use this code after youve completed the sign-up process. If you do not sign up before the expiration date, you must request a new code. · [employer] Access Code: [email or web address] Expires: 10/19/2017  8:15 AM 
 
4. Enter the last four digits of your Social Security Number (xxxx) and Date of Birth (mm/dd/yyyy) as indicated and click Submit. You will be taken to the next sign-up page. 5. Create a Liveyearbookt ID. This will be your [employer] login ID and cannot be changed, so think of one that is secure and easy to remember. 6. Create a [employer] password. You can change your password at any time. 7. Enter your Password Reset Question and Answer. This can be used at a later time if you forget your password. 8. Enter your e-mail address. You will receive e-mail notification when new information is available in [address] 9. Click Sign Up. You can now view and download portions of your medical record. 10. Click the Download Summary menu link to download a portable copy of your medical information. If you have questions, please visit the Frequently Asked Questions section of the [employer] website. Remember, [employer] is NOT to be used for urgent needs. For medical emergencies, dial 911. Now available from your iPhone and Android! Please provide this summary of care documentation to your next provider. Lyme Disease Testing Disclaimer:   
 § 64.0-0576.6. (Expires July 1, 2018) Lyme disease testing information disclosure. A. Every licensee or his in-office designee who orders a laboratory test for the presence of Lyme disease shall provide to the patient or his legal representative the following written information: \"ACCORDING TO THE CENTERS FOR DISEASE CONTROL AND PREVENTION, AS OF 2011 LYME DISEASE IS THE SIXTH FASTEST GROWING DISEASE IN THE [place]. YOUR HEALTH CARE PROVIDER HAS ORDERED A LABORATORY TEST FOR THE PRESENCE OF LYME DISEASE FOR YOU. CURRENT LABORATORY TESTING FOR LYME DISEASE CAN BE PROBLEMATIC AND STANDARD LABORATORY TESTS OFTEN RESULT IN FALSE NEGATIVE AND FALSE POSITIVE RESULTS, AND IF DONE TOO EARLY, YOU MAY NOT HAVE PRODUCED ENOUGH ANTIBODIES TO BE CONSIDERED POSITIVE BECAUSE YOUR IMMUNE RESPONSE REQUIRES TIME TO DEVELOP ANTIBODIES. IF YOU ARE TESTED FOR LYME DISEASE, AND THE RESULTS ARE NEGATIVE, THIS DOES NOT NECESSARILY MEAN YOU DO NOT HAVE LYME DISEASE. IF YOU CONTINUE TO EXPERIENCE SYMPTOMS, YOU SHOULD CONTACT YOUR HEALTH CARE PROVIDER AND INQUIRE ABOUT THE APPROPRIATENESS OF RETESTING OR ADDITIONAL TREATMENT. \"  
B.  Licensees shall be immune from civil liability for the provision of the written information required by this section absent gross negligence or willful misconduct. Your primary care clinician is listed as Pilo Reyes. If you have any questions after today's visit, please call [phone].

## 2017-07-23 LAB
ALBUMIN SERPL-MCNC: 4.2 G/DL (ref 3.6–4.8)
ALBUMIN/GLOB SERPL: 1.6 {RATIO} (ref 1.2–2.2)
ALP SERPL-CCNC: 89 IU/L (ref 39–117)
ALT SERPL-CCNC: 19 IU/L (ref 0–44)
ANA SER QL: NEGATIVE
AST SERPL-CCNC: 19 IU/L (ref 0–40)
B BURGDOR IGG+IGM SER-ACNC: <0.91 ISR (ref 0–0.9)
BILIRUB SERPL-MCNC: 0.3 MG/DL (ref 0–1.2)
BUN SERPL-MCNC: 11 MG/DL (ref 8–27)
BUN/CREAT SERPL: 12 (ref 10–24)
CALCIUM SERPL-MCNC: 9.9 MG/DL (ref 8.6–10.2)
CHLORIDE SERPL-SCNC: 103 MMOL/L (ref 96–106)
CHOLEST SERPL-MCNC: 190 MG/DL (ref 100–199)
CO2 SERPL-SCNC: 20 MMOL/L (ref 18–29)
CREAT SERPL-MCNC: 0.93 MG/DL (ref 0.76–1.27)
ERYTHROCYTE [SEDIMENTATION RATE] IN BLOOD BY WESTERGREN METHOD: 2 MM/HR (ref 0–30)
GLOBULIN SER CALC-MCNC: 2.6 G/DL (ref 1.5–4.5)
GLUCOSE SERPL-MCNC: 87 MG/DL (ref 65–99)
HDLC SERPL-MCNC: 34 MG/DL
LDLC SERPL CALC-MCNC: 122 MG/DL (ref 0–99)
POTASSIUM SERPL-SCNC: 4.8 MMOL/L (ref 3.5–5.2)
PROT SERPL-MCNC: 6.8 G/DL (ref 6–8.5)
RHEUMATOID FACT SERPL-ACNC: <10 IU/ML (ref 0–13.9)
SODIUM SERPL-SCNC: 141 MMOL/L (ref 134–144)
TRIGL SERPL-MCNC: 168 MG/DL (ref 0–149)
VLDLC SERPL CALC-MCNC: 34 MG/DL (ref 5–40)

## 2017-07-27 NOTE — PROGRESS NOTES
Chief Complaint   Patient presents with    Cholesterol Problem    Depression     he is a 59y.o. year old male who presents for follow up. He has hx of total hip replacement and chronic LBP. Patient has previously been seen by ortho, neurology and pain management. He says lumbar back pain improved with recent lumbar injections. Patientconcerned about lyme exposure. He continues to smoke. BP Readings from Last 3 Encounters:   07/21/17 116/68   04/11/17 102/64   03/13/17 129/83     Wt Readings from Last 3 Encounters:   07/21/17 161 lb (73 kg)   04/11/17 170 lb (77.1 kg)   03/13/17 174 lb (78.9 kg)     Body mass index is 21.84 kg/(m^2). Hyperlipidemia    Cardiovascular risks for him are: LDL goal is under 100  hyperlipidemia  smoker. Currently he takes Pravachol (pravastatin)   Lab Results   Component Value Date/Time    Cholesterol, total 190 07/21/2017 09:31 AM    HDL Cholesterol 34 07/21/2017 09:31 AM    LDL, calculated 122 07/21/2017 09:31 AM    Triglyceride 168 07/21/2017 09:31 AM     Lab Results   Component Value Date/Time    ALT (SGPT) 19 07/21/2017 09:31 AM    AST (SGOT) 19 07/21/2017 09:31 AM    Alk.  phosphatase 89 07/21/2017 09:31 AM    Bilirubin, total 0.3 07/21/2017 09:31 AM     Our goal is to lower hyperlipidemia to decrease the risks of strokes and heart attacks      Patient Active Problem List   Diagnosis Code    Hyperlipemia E78.5    Insomnia G47.00    History of left hip replacement Z96.642    Depression F32.9    Tobacco abuse Z72.0     Past Surgical History:   Procedure Laterality Date    COLONOSCOPY N/A 8/23/2016    COLONOSCOPY performed by Becky Somers MD at [address] HX COLONOSCOPY      HX GI      surgery for hemorrhoids    HX HEENT      all teeth removed    HX ORTHOPAEDIC      left hip replacement    HX ORTHOPAEDIC      infection after sutured finger/then healed/then had a growth removed 2 1/2 yrs later - benign    HX OTHER SURGICAL      growth on left side of testicle removed - benign     Social History     Social History    Marital status: SINGLE     Spouse name: N/A    Number of children: N/A    Years of education: N/A     Occupational History    Not on file. Social History Main Topics    Smoking status: Current Every Day Smoker     Packs/day: 0.50     Years: 45.00    Smokeless tobacco: Never Used    Alcohol use No    Drug use: No    Sexual activity: No     Other Topics Concern    Not on file     Social History Narrative     Family History   Problem Relation Age of Onset    Cancer Mother      breast    Stroke Mother      x2    Cancer Father      throat/lung    Heart Disease Father     Lung Disease Father     Thyroid Disease Father     Heart Attack Father     Suicide Brother     Psychiatric Disorder Brother     Heart Attack Paternal Uncle     Heart Attack Paternal Uncle      x3     Current Outpatient Prescriptions   Medication Sig    traZODone (DESYREL) 150 mg tablet TAKE ONE TABLET BY MOUTH NIGHTLY    pravastatin (PRAVACHOL) 40 mg tablet TAKE ONE TABLET BY MOUTH nightly    montelukast (SINGULAIR) 10 mg tablet Take 1 Tab by mouth daily for 90 days. Indications: ALLERGIC RHINITIS    tamsulosin (FLOMAX) 0.4 mg capsule TAKE ONE CAPSULE BY MOUTH EVERY DAY    diazePAM (VALIUM) 5 mg tablet Take 1 Tab by mouth every eight (8) hours as needed. Max Daily Amount: 15 mg.    hydrocortisone (ANUSOL-HC) 2.5 % topical cream Apply  to affected area two (2) times a day. use thin layer     No current facility-administered medications for this visit.       Allergies   Allergen Reactions    Percocet [Oxycodone-Acetaminophen] Rash and Itching     itching       Review of Systems:  Constitutional: Negative for fatigue, malaise  Resp: Negative for cough, wheezing or SOB  CV: Negative for chest pain, dizziness or palpitations  MS: see HPI  Neuro: Negative for HA, weakness or paresthesia  Psych: History of depression, insomnia     Vitals:    07/21/17 0828   BP: 116/68   Pulse: 64   Resp: 16   Temp: 97.8 °F (36.6 °C)   TempSrc: Oral   SpO2: 96%   Weight: 161 lb (73 kg)   Height: 6' (1.829 m)       Physical Examination:  General: thin, NAD  Head: Normocephalic, atraumatic  Eyes: Sclera clear, EOMI  Neck: Normal range of motion  Respiratory: symmetrical, unlabored effort  Cardiovascular: Regular rate and rhythm  Extremities:  antalgic gait,  Neurologic: No focal deficits  Psych: affect appropriate    Diagnoses and all orders for this visit:    1. Hyperlipidemia, unspecified hyperlipidemia type  -     METABOLIC PANEL, COMPREHENSIVE  -     LIPID PANEL    2. Depression, unspecified depression type    3. Primary insomnia    4. Seborrheic keratoses    5. Arthralgia, unspecified joint  -     RHEUMATOID FACTOR, QL  -     SED RATE (ESR)  -     CHACORTA BY MULTIPLEX FLOW IA, QL    6. Tick bite, subsequent encounter  -     LYME AB TOTAL W/RFLX W BLOT; Future    7. Impacted cerumen of left ear    8. Tobacco abuse    Other orders  -     LYME AB TOTAL W/RFLX W BLOT    Advised patient to stop all tobacco use. I have discussed the diagnosis with the patient and the intended plan as seen in the above orders. The patient expresses understanding and agreement with our plan of care. The patient has received an after-visit summary. The patient knows to call our office if there are any questions or concerns regarding diagnosis and treatment plans. I have discussed medication side effects and warnings with the patient as well. Follow-up Disposition:  Return in about 6 months (around 1/21/2018), or if symptoms worsen or fail to improve.

## 2017-08-01 DIAGNOSIS — M48.061 SPINAL STENOSIS, LUMBAR: ICD-10-CM

## 2017-08-02 RX ORDER — DIAZEPAM 5 MG/1
5 TABLET ORAL
Qty: 30 TAB | Refills: 3 | OUTPATIENT
Start: 2017-08-02

## 2017-08-15 ENCOUNTER — TELEPHONE (OUTPATIENT)
Dept: FAMILY MEDICINE CLINIC | Age: 62
End: 2017-08-15

## 2017-08-28 ENCOUNTER — OFFICE VISIT (OUTPATIENT)
Dept: FAMILY MEDICINE CLINIC | Age: 62
End: 2017-08-28

## 2017-08-28 VITALS
RESPIRATION RATE: 20 BRPM | HEART RATE: 60 BPM | DIASTOLIC BLOOD PRESSURE: 88 MMHG | BODY MASS INDEX: 22.32 KG/M2 | TEMPERATURE: 97 F | OXYGEN SATURATION: 98 % | HEIGHT: 72 IN | WEIGHT: 164.8 LBS | SYSTOLIC BLOOD PRESSURE: 129 MMHG

## 2017-08-28 DIAGNOSIS — Z72.0 TOBACCO ABUSE: ICD-10-CM

## 2017-08-28 DIAGNOSIS — M54.50 LEFT-SIDED LOW BACK PAIN WITHOUT SCIATICA, UNSPECIFIED CHRONICITY: ICD-10-CM

## 2017-08-28 DIAGNOSIS — Z96.642 HISTORY OF LEFT HIP REPLACEMENT: ICD-10-CM

## 2017-08-28 DIAGNOSIS — E78.5 HYPERLIPIDEMIA, UNSPECIFIED HYPERLIPIDEMIA TYPE: Primary | ICD-10-CM

## 2017-08-28 RX ORDER — VARENICLINE TARTRATE 0.5 MG/1
0.5 TABLET, FILM COATED ORAL 2 TIMES DAILY
Qty: 60 TAB | Refills: 2 | Status: SHIPPED | OUTPATIENT
Start: 2017-08-28 | End: 2017-10-30 | Stop reason: SDUPTHER

## 2017-08-28 NOTE — PROGRESS NOTES
Chief Complaint   Patient presents with    Results    Medication Evaluation     he is a 59y.o. year old male who presents for follow up. He has hx of total hip replacement and chronic LBP. Patient has previously been seen by ortho, neurology and pain management. He wants to review lab results. He asks about UDS positive for cannabis 5 months ago. He denies use and says he was living in a house with multiple tenants at that time. He has bottle of diazepam prescribed 5 months ago with 2 tabs still in this. He asks for another UDS so that he can get this for back pain prn. He continues to smoke and asks about medication to help him quit. BP Readings from Last 3 Encounters:   08/28/17 129/88   07/21/17 116/68   04/11/17 102/64     Wt Readings from Last 3 Encounters:   08/28/17 164 lb 12.8 oz (74.8 kg)   07/21/17 161 lb (73 kg)   04/11/17 170 lb (77.1 kg)     Body mass index is 22.35 kg/(m^2). Hyperlipidemia    Cardiovascular risks for him are: LDL goal is under 100  hyperlipidemia  smoker. Currently he takes Pravachol (pravastatin)   Lab Results   Component Value Date/Time    Cholesterol, total 190 07/21/2017 09:31 AM    HDL Cholesterol 34 07/21/2017 09:31 AM    LDL, calculated 122 07/21/2017 09:31 AM    Triglyceride 168 07/21/2017 09:31 AM     Lab Results   Component Value Date/Time    ALT (SGPT) 19 07/21/2017 09:31 AM    AST (SGOT) 19 07/21/2017 09:31 AM    Alk.  phosphatase 89 07/21/2017 09:31 AM    Bilirubin, total 0.3 07/21/2017 09:31 AM     Our goal is to lower hyperlipidemia to decrease the risks of strokes and heart attacks      Patient Active Problem List   Diagnosis Code    Hyperlipemia E78.5    Insomnia G47.00    History of left hip replacement Z96.642    Depression F32.9    Tobacco abuse Z72.0     Past Surgical History:   Procedure Laterality Date    COLONOSCOPY N/A 8/23/2016    COLONOSCOPY performed by Alexa Gamble MD at [address] HX COLONOSCOPY      HX GI      surgery for hemorrhoids    HX HEENT      all teeth removed    HX ORTHOPAEDIC      left hip replacement    HX ORTHOPAEDIC      infection after sutured finger/then healed/then had a growth removed 2 1/2 yrs later - benign    HX OTHER SURGICAL      growth on left side of testicle removed - benign     Social History     Social History    Marital status: SINGLE     Spouse name: N/A    Number of children: N/A    Years of education: N/A     Occupational History    Not on file. Social History Main Topics    Smoking status: Current Every Day Smoker     Packs/day: 0.50     Years: 45.00    Smokeless tobacco: Never Used    Alcohol use No    Drug use: No    Sexual activity: No     Other Topics Concern    Not on file     Social History Narrative     Family History   Problem Relation Age of Onset    Cancer Mother      breast    Stroke Mother      x2    Cancer Father      throat/lung    Heart Disease Father     Lung Disease Father     Thyroid Disease Father     Heart Attack Father     Suicide Brother     Psychiatric Disorder Brother     Heart Attack Paternal Uncle     Heart Attack Paternal Uncle      x3     Current Outpatient Prescriptions   Medication Sig    varenicline (CHANTIX) 0.5 mg tablet Take 1 Tab by mouth two (2) times a day for 90 days.  traZODone (DESYREL) 150 mg tablet TAKE ONE TABLET BY MOUTH NIGHTLY    pravastatin (PRAVACHOL) 40 mg tablet TAKE ONE TABLET BY MOUTH nightly    montelukast (SINGULAIR) 10 mg tablet Take 1 Tab by mouth daily for 90 days. Indications: ALLERGIC RHINITIS    tamsulosin (FLOMAX) 0.4 mg capsule TAKE ONE CAPSULE BY MOUTH EVERY DAY    diazePAM (VALIUM) 5 mg tablet Take 1 Tab by mouth every eight (8) hours as needed. Max Daily Amount: 15 mg.    hydrocortisone (ANUSOL-HC) 2.5 % topical cream Apply  to affected area two (2) times a day. use thin layer     No current facility-administered medications for this visit.       Allergies   Allergen Reactions    Percocet [Oxycodone-Acetaminophen] Rash and Itching     itching       Review of Systems:  Constitutional: Negative for fatigue, malaise  Resp: Negative for cough, wheezing or SOB  CV: Negative for chest pain, dizziness or palpitations  MS: see HPI  Neuro: Negative for HA, weakness or paresthesia  Psych: History of depression, insomnia     Vitals:    08/28/17 0923   BP: 129/88   Pulse: 60   Resp: 20   Temp: 97 °F (36.1 °C)   TempSrc: Oral   SpO2: 98%   Weight: 164 lb 12.8 oz (74.8 kg)   Height: 6' (1.829 m)       Physical Examination:  General: thin, NAD  Head: Normocephalic, atraumatic  Eyes: Sclera clear, EOMI  Neck: Normal range of motion  Respiratory: symmetrical, unlabored effort  Cardiovascular: Regular rate and rhythm  Extremities:  antalgic gait,  Neurologic: No focal deficits  Psych: affect appropriate    Diagnoses and all orders for this visit:    1. Hyperlipidemia, unspecified hyperlipidemia type    2. History of left hip replacement  -     9-DRUG SCREEN + OXY, UR    3. Left-sided low back pain without sciatica, unspecified chronicity  -     9-DRUG SCREEN + OXY, UR    4. Tobacco abuse  -     varenicline (CHANTIX) 0.5 mg tablet; Take 1 Tab by mouth two (2) times a day for 90 days. Advised patient to stop all tobacco use. I have discussed the diagnosis with the patient and the intended plan as seen in the above orders. The patient expresses understanding and agreement with our plan of care. The patient has received an after-visit summary. The patient knows to call our office if there are any questions or concerns regarding diagnosis and treatment plans. I have discussed medication side effects and warnings with the patient as well. Follow-up Disposition:  Return in about 6 months (around 2/28/2018), or if symptoms worsen or fail to improve.

## 2017-08-28 NOTE — MR AVS SNAPSHOT
Visit Information Date & Time Provider Department Dept. Phone Encounter #  
 8/28/2017  9:20 AM Claudette Bellis, MD [address] [place] [address] [id number] Upcoming Health Maintenance Date Due Pneumococcal 19-64 Medium Risk (1 of 1 - PPSV23) 12/15/1972 DTaP/Tdap/Td series (1 - Tdap) 12/15/1974 ZOSTER VACCINE AGE 60> 10/15/2013 INFLUENZA AGE 9 TO ADULT 8/1/2017 FOBT Q 1 YEAR AGE 50-75 11/17/2017 Allergies as of 8/28/2017  Review Complete On: 8/28/2017 By: Delfino Bustillos Severity Noted Reaction Type Reactions Percocet [Oxycodone-acetaminophen]  01/28/2015    Rash, Itching  
 itching Current Immunizations  Never Reviewed No immunizations on file. Not reviewed this visit You Were Diagnosed With   
  
 Codes Comments Hyperlipidemia, unspecified hyperlipidemia type    -  Primary ICD-10-CM: E78.5 ICD-9-CM: 272.4 History of left hip replacement     ICD-10-CM: V24.909 ICD-9-CM: V43.64 Left-sided low back pain without sciatica, unspecified chronicity     ICD-10-CM: M54.5 ICD-9-CM: 724.2 Tobacco abuse     ICD-10-CM: Z72.0 ICD-9-CM: 305.1 Vitals BP Pulse Temp Resp Height(growth percentile) Weight(growth percentile) 129/88 60 97 °F (36.1 °C) (Oral) 20 6' (1.829 m) 164 lb 12.8 oz (74.8 kg) SpO2 BMI Smoking Status 98% 22.35 kg/m2 Current Every Day Smoker Vitals History BMI and BSA Data Body Mass Index Body Surface Area  
 22.35 kg/m 2 1.95 m 2 Preferred Pharmacy Pharmacy Name Phone [facility] [address] [phone] Your Updated Medication List  
  
   
This list is accurate as of: 8/28/17 10:24 AM.  Always use your most recent med list.  
  
  
  
  
 diazePAM 5 mg tablet Commonly known as:  VALIUM Take 1 Tab by mouth every eight (8) hours as needed. Max Daily Amount: 15 mg. hydrocortisone 2.5 % topical cream  
Commonly known as:  ANUSOL-HC Apply  to affected area two (2) times a day. use thin layer  
  
 montelukast 10 mg tablet Commonly known as:  SINGULAIR Take 1 Tab by mouth daily for 90 days. Indications: ALLERGIC RHINITIS  
  
 pravastatin 40 mg tablet Commonly known as:  PRAVACHOL  
TAKE ONE TABLET BY MOUTH nightly  
  
 tamsulosin 0.4 mg capsule Commonly known as:  FLOMAX TAKE ONE CAPSULE BY MOUTH EVERY DAY  
  
 traZODone 150 mg tablet Commonly known as:  DESYREL  
TAKE ONE TABLET BY MOUTH NIGHTLY  
  
 varenicline 0.5 mg tablet Commonly known as:  Jessica Legions Take 1 Tab by mouth two (2) times a day for 90 days. Prescriptions Sent to Pharmacy Refills  
 varenicline (CHANTIX) 0.5 mg tablet 2 Sig: Take 1 Tab by mouth two (2) times a day for 90 days. Class: Normal  
 Pharmacy: [facility] [address] #: [phone] Route: Oral  
  
We Performed the Following 9-DRUG SCREEN + OXY, UR [id number] CPT(R)] Patient Instructions Learning About High Cholesterol What is high cholesterol? Cholesterol is a type of fat in your blood. It is needed for many body functions, such as making new cells. Cholesterol is made by your body. It also comes from food you eat. If you have too much cholesterol, it starts to build up in your arteries. This is called hardening of the arteries, or atherosclerosis. High cholesterol raises your risk of a heart attack and stroke. There are different types of cholesterol. LDL is the \"bad\" cholesterol. High LDL can raise your risk for heart disease, heart attack, and stroke. HDL is the \"good\" cholesterol. High HDL is linked with a lower risk for heart disease, heart attack, and stroke. Your cholesterol levels help your doctor find out your risk for having a heart attack or stroke. How can you prevent high cholesterol? A heart-healthy lifestyle can help you prevent high cholesterol. This lifestyle helps lower your risk for a heart attack and stroke. · Eat heart-healthy foods. ¨ Eat fruits, vegetables, whole grains (like oatmeal), dried beans and peas, nuts and seeds, soy products (like tofu), and fat-free or low-fat dairy products. ¨ Replace butter, margarine, and hydrogenated or partially hydrogenated oils with olive and canola oils. (Canola oil margarine without trans fat is fine.) ¨ Replace red meat with fish, poultry, and soy protein (like tofu). ¨ Limit processed and packaged foods like chips, crackers, and cookies. · Be active. Exercise can improve your cholesterol level. Get at least 30 minutes of exercise on most days of the week. Walking is a good choice. You also may want to do other activities, such as running, swimming, cycling, or playing tennis or team sports. · Stay at a healthy weight. Lose weight if you need to. · Don't smoke. If you need help quitting, talk to your doctor about stop-smoking programs and medicines. These can increase your chances of quitting for good. How is high cholesterol treated? The goal of treatment is to reduce your chances of having a heart attack or stroke. The goal is not to lower your cholesterol numbers only. · You may make lifestyle changes, such as eating healthy foods, not smoking, losing weight, and being more active. · You may have to take medicine. Follow-up care is a key part of your treatment and safety. Be sure to make and go to all appointments, and call your doctor if you are having problems. It's also a good idea to know your test results and keep a list of the medicines you take. Where can you learn more? Go to http://catracho-bryce.info/. Enter W629 in the search box to learn more about \"Learning About High Cholesterol. \" Current as of: April 3, 2017 Content Version: 11.3 © 1222-0617 Healthwise, Incorporated. Care instructions adapted under license by [employer] (which disclaims liability or warranty for this information). If you have questions about a medical condition or this instruction, always ask your healthcare professional. [address] any warranty or liability for your use of this information. Introducing [facility]! Kj David introduces TrepUp patient portal. Now you can access parts of your medical record, email your doctor's office, and request medication refills online. 1. In your internet browser, go to [email or web address] 2. Click on the First Time User? Click Here link in the Sign In box. You will see the New Member Sign Up page. 3. Enter your TrepUp Access Code exactly as it appears below. You will not need to use this code after youve completed the sign-up process. If you do not sign up before the expiration date, you must request a new code. · TrepUp Access Code: [email or web address] Expires: 10/19/2017  8:15 AM 
 
4. Enter the last four digits of your Social Security Number (xxxx) and Date of Birth (mm/dd/yyyy) as indicated and click Submit. You will be taken to the next sign-up page. 5. Create a TrepUp ID. This will be your TrepUp login ID and cannot be changed, so think of one that is secure and easy to remember. 6. Create a TrepUp password. You can change your password at any time. 7. Enter your Password Reset Question and Answer. This can be used at a later time if you forget your password. 8. Enter your e-mail address. You will receive e-mail notification when new information is available in [address] 9. Click Sign Up. You can now view and download portions of your medical record. 10. Click the Download Summary menu link to download a portable copy of your medical information.  
 
If you have questions, please visit the Frequently Asked Questions section of the Xiimo. Remember, MashMangohart is NOT to be used for urgent needs. For medical emergencies, dial 911. Now available from your iPhone and Android! Please provide this summary of care documentation to your next provider. Your primary care clinician is listed as Serg Valentine. If you have any questions after today's visit, please call [phone].

## 2017-08-28 NOTE — PROGRESS NOTES
Health Maintenance Due   Topic Date Due    Pneumococcal 19-64 Medium Risk (1 of 1 - PPSV23) 12/15/1972    DTaP/Tdap/Td series (1 - Tdap) 12/15/1974    ZOSTER VACCINE AGE 60>  10/15/2013    INFLUENZA AGE 9 TO ADULT  08/01/2017

## 2017-08-28 NOTE — PATIENT INSTRUCTIONS
Learning About High Cholesterol  What is high cholesterol? Cholesterol is a type of fat in your blood. It is needed for many body functions, such as making new cells. Cholesterol is made by your body. It also comes from food you eat. If you have too much cholesterol, it starts to build up in your arteries. This is called hardening of the arteries, or atherosclerosis. High cholesterol raises your risk of a heart attack and stroke. There are different types of cholesterol. LDL is the \"bad\" cholesterol. High LDL can raise your risk for heart disease, heart attack, and stroke. HDL is the \"good\" cholesterol. High HDL is linked with a lower risk for heart disease, heart attack, and stroke. Your cholesterol levels help your doctor find out your risk for having a heart attack or stroke. How can you prevent high cholesterol? A heart-healthy lifestyle can help you prevent high cholesterol. This lifestyle helps lower your risk for a heart attack and stroke. · Eat heart-healthy foods. ¨ Eat fruits, vegetables, whole grains (like oatmeal), dried beans and peas, nuts and seeds, soy products (like tofu), and fat-free or low-fat dairy products. ¨ Replace butter, margarine, and hydrogenated or partially hydrogenated oils with olive and canola oils. (Canola oil margarine without trans fat is fine.)  ¨ Replace red meat with fish, poultry, and soy protein (like tofu). ¨ Limit processed and packaged foods like chips, crackers, and cookies. · Be active. Exercise can improve your cholesterol level. Get at least 30 minutes of exercise on most days of the week. Walking is a good choice. You also may want to do other activities, such as running, swimming, cycling, or playing tennis or team sports. · Stay at a healthy weight. Lose weight if you need to. · Don't smoke. If you need help quitting, talk to your doctor about stop-smoking programs and medicines. These can increase your chances of quitting for good.   How is high cholesterol treated? The goal of treatment is to reduce your chances of having a heart attack or stroke. The goal is not to lower your cholesterol numbers only. · You may make lifestyle changes, such as eating healthy foods, not smoking, losing weight, and being more active. · You may have to take medicine. Follow-up care is a key part of your treatment and safety. Be sure to make and go to all appointments, and call your doctor if you are having problems. It's also a good idea to know your test results and keep a list of the medicines you take. Where can you learn more? Go to http://catracho-bryce.info/. Enter H476 in the search box to learn more about \"Learning About High Cholesterol. \"  Current as of: April 3, 2017  Content Version: 11.3  © 0291-2016 [employer], [employer]. Care instructions adapted under license by [employer] (which disclaims liability or warranty for this information). If you have questions about a medical condition or this instruction, always ask your healthcare professional. [address] any warranty or liability for your use of this information.

## 2017-08-28 NOTE — LETTER
Mr. William Andrew 
[address] [facility] [address] Lab Results Component Value Date/Time WBC 11.7 03/13/2017 04:04 PM  
HGB 15.0 03/13/2017 04:04 PM  
HCT 44.6 03/13/2017 04:04 PM  
PLATELET 915 39/99/7084 04:04 PM  
MCV 89 03/13/2017 04:04 PM  
 
Lab Results Component Value Date/Time Cholesterol, total 190 07/21/2017 09:31 AM  
HDL Cholesterol 34 07/21/2017 09:31 AM  
LDL, calculated 122 07/21/2017 09:31 AM  
Triglyceride 168 07/21/2017 09:31 AM  
 
Lab Results Component Value Date/Time  
Prostate Specific Ag 3.0 03/13/2017 04:04 PM  
 
Lab Results Component Value Date/Time TSH 1.600 11/17/2016 11:26 AM  
T4, Total 7.0 11/17/2016 11:26 AM  
  
Lab Results Component Value Date/Time Sodium 141 07/21/2017 09:31 AM  
 Potassium 4.8 07/21/2017 09:31 AM  
 Chloride 103 07/21/2017 09:31 AM  
 CO2 20 07/21/2017 09:31 AM  
 Glucose 87 07/21/2017 09:31 AM  
 BUN 11 07/21/2017 09:31 AM  
 Creatinine 0.93 07/21/2017 09:31 AM  
 BUN/Creatinine ratio 12 07/21/2017 09:31 AM  
 GFR est  07/21/2017 09:31 AM  
 GFR est non-AA 87 07/21/2017 09:31 AM  
 Calcium 9.9 07/21/2017 09:31 AM  
 Bilirubin, total 0.3 07/21/2017 09:31 AM  
 ALT (SGPT) 19 07/21/2017 09:31 AM  
 AST (SGOT) 19 07/21/2017 09:31 AM  
 Alk. phosphatase 89 07/21/2017 09:31 AM  
 Protein, total 6.8 07/21/2017 09:31 AM  
 Albumin 4.2 07/21/2017 09:31 AM  
 A-G Ratio 1.6 07/21/2017 09:31 AM  
  
 Lyme Ab, IgG/IgM <0.91  0.00 - 0.90 ISR Final  
  Comment:  
                                  Negative         <0.91  
                                Equivocal  0.91 - 1.09  
                                Positive         >1.09 Component Results   
  Component Value Flag Ref Range Units Status  
  Antinuclear Antibodies Direct Negative  Negative  Final  
 
Component Results   
  Component Value Flag Ref Range Units Status  
  Rheumatoid factor <10.0  0.0 - 13.9 IU/mL Final

## 2017-08-30 LAB
AMPHETAMINES UR QL SCN: NEGATIVE NG/ML
BARBITURATES UR QL SCN: NEGATIVE NG/ML
BENZODIAZ UR QL SCN: NEGATIVE NG/ML
BZE UR QL SCN: NEGATIVE NG/ML
CANNABINOIDS UR QL SCN: NEGATIVE NG/ML
CREAT UR-MCNC: 84.5 MG/DL (ref 20–300)
METHADONE UR QL SCN: NEGATIVE NG/ML
OPIATES UR QL SCN: NEGATIVE NG/ML
OXYCODONE+OXYMORPHONE UR QL SCN: NEGATIVE NG/ML
PCP UR QL SCN: NEGATIVE NG/ML
PH UR: 4.8 [PH] (ref 4.5–8.9)
PLEASE NOTE:, 733163: NORMAL
PROPOXYPH UR QL SCN: NEGATIVE NG/ML

## 2017-09-01 DIAGNOSIS — M48.061 SPINAL STENOSIS, LUMBAR: ICD-10-CM

## 2017-09-01 RX ORDER — DIAZEPAM 5 MG/1
5 TABLET ORAL
Qty: 30 TAB | Refills: 0 | Status: SHIPPED | OUTPATIENT
Start: 2017-09-01 | End: 2018-02-09 | Stop reason: SDUPTHER

## 2017-09-01 NOTE — TELEPHONE ENCOUNTER
Patient called and requested the lab results. Read him what the result letter stated. Patient also requested a refill on his Diazepam today. Please fax if possible. Please let him know at [phone].

## 2018-02-01 RX ORDER — TAMSULOSIN HYDROCHLORIDE 0.4 MG/1
CAPSULE ORAL
Qty: 90 CAP | Refills: 0 | Status: SHIPPED | OUTPATIENT
Start: 2018-02-01 | End: 2018-05-09 | Stop reason: SDUPTHER

## 2018-02-09 DIAGNOSIS — M48.061 SPINAL STENOSIS, LUMBAR: ICD-10-CM

## 2018-02-09 RX ORDER — DIAZEPAM 5 MG/1
TABLET ORAL
Qty: 30 TAB | Refills: 0 | Status: SHIPPED | OUTPATIENT
Start: 2018-02-09 | End: 2018-03-02 | Stop reason: SDUPTHER

## 2018-03-02 ENCOUNTER — OFFICE VISIT (OUTPATIENT)
Dept: FAMILY MEDICINE CLINIC | Age: 63
End: 2018-03-02

## 2018-03-02 VITALS
OXYGEN SATURATION: 99 % | SYSTOLIC BLOOD PRESSURE: 137 MMHG | HEART RATE: 75 BPM | TEMPERATURE: 98 F | WEIGHT: 177 LBS | HEIGHT: 72 IN | DIASTOLIC BLOOD PRESSURE: 78 MMHG | BODY MASS INDEX: 23.98 KG/M2 | RESPIRATION RATE: 18 BRPM

## 2018-03-02 DIAGNOSIS — M51.36 DDD (DEGENERATIVE DISC DISEASE), LUMBAR: ICD-10-CM

## 2018-03-02 DIAGNOSIS — K21.9 GASTROESOPHAGEAL REFLUX DISEASE, ESOPHAGITIS PRESENCE NOT SPECIFIED: ICD-10-CM

## 2018-03-02 DIAGNOSIS — Z13.39 SCREENING FOR ALCOHOLISM: ICD-10-CM

## 2018-03-02 DIAGNOSIS — R35.1 BENIGN PROSTATIC HYPERPLASIA WITH NOCTURIA: ICD-10-CM

## 2018-03-02 DIAGNOSIS — N40.1 BENIGN PROSTATIC HYPERPLASIA WITH NOCTURIA: ICD-10-CM

## 2018-03-02 DIAGNOSIS — M48.061 SPINAL STENOSIS OF LUMBAR REGION, UNSPECIFIED WHETHER NEUROGENIC CLAUDICATION PRESENT: ICD-10-CM

## 2018-03-02 DIAGNOSIS — Z00.00 MEDICARE ANNUAL WELLNESS VISIT, SUBSEQUENT: ICD-10-CM

## 2018-03-02 DIAGNOSIS — Z13.31 SCREENING FOR DEPRESSION: ICD-10-CM

## 2018-03-02 DIAGNOSIS — E78.5 HYPERLIPIDEMIA, UNSPECIFIED HYPERLIPIDEMIA TYPE: Primary | ICD-10-CM

## 2018-03-02 DIAGNOSIS — Z72.0 TOBACCO ABUSE: ICD-10-CM

## 2018-03-02 RX ORDER — VARENICLINE TARTRATE 0.5 MG/1
0.5 TABLET, FILM COATED ORAL 2 TIMES DAILY
Qty: 60 TAB | Refills: 0 | Status: SHIPPED | OUTPATIENT
Start: 2018-03-02 | End: 2018-05-24

## 2018-03-02 RX ORDER — TIZANIDINE 4 MG/1
4 TABLET ORAL 2 TIMES DAILY
Qty: 90 TAB | Refills: 1 | Status: SHIPPED | OUTPATIENT
Start: 2018-03-02 | End: 2018-05-08 | Stop reason: SDUPTHER

## 2018-03-02 RX ORDER — OMEPRAZOLE 20 MG/1
CAPSULE, DELAYED RELEASE ORAL
COMMUNITY
Start: 2018-02-26 | End: 2019-12-06 | Stop reason: SDUPTHER

## 2018-03-02 RX ORDER — DIAZEPAM 5 MG/1
TABLET ORAL
Qty: 30 TAB | Refills: 0 | Status: SHIPPED | OUTPATIENT
Start: 2018-03-02 | End: 2018-05-24 | Stop reason: SDUPTHER

## 2018-03-02 NOTE — PROGRESS NOTES
1. Have you been to the ER, urgent care clinic since your last visit? Hospitalized since your last visit? No    2. Have you seen or consulted any other health care providers outside of the [address] [place] [address] since your last visit? Include any pap smears or colon screening.  No  Reviewed record in preparation for visit and have necessary documentation  Pt did not bring medication to office visit for review  opportunity was given for questions  Goals that were addressed and/or need to be completed during or after this appointment include    Health Maintenance Due   Topic Date Due    Pneumococcal 19-64 Medium Risk (1 of 1 - PPSV23) 12/15/1972    DTaP/Tdap/Td series (1 - Tdap) 12/15/1974    ZOSTER VACCINE AGE 60>  10/15/2013    Influenza Age 9 to Adult  08/01/2017    FOBT Q 1 YEAR AGE 50-75  11/17/2017

## 2018-03-02 NOTE — PROGRESS NOTES
Chief Complaint   Patient presents with   Travis Lester     he is a 57y.o. year old male who presents for follow up. Patient with hx of HLD, depression, insomnia, BPH and tobacco abuse. He has hx of total hip replacement and chronic LBP and has previously been seen by ortho, neurology and pain management. He has resumed smoking and asks about refill medication to help him quit. Says his mood is OK and does not want new medication. Says he feels stressed by expenses and a fixed income. BP Readings from Last 3 Encounters:   03/02/18 137/78   08/28/17 129/88   07/21/17 116/68     Wt Readings from Last 3 Encounters:   03/02/18 177 lb (80.3 kg)   08/28/17 164 lb 12.8 oz (74.8 kg)   07/21/17 161 lb (73 kg)     Body mass index is 24.01 kg/(m^2). Hyperlipidemia    Cardiovascular risks for him are: hyperlipidemia, age, smoker. Currently he takes Pravachol (pravastatin)     Our goal is to lower hyperlipidemia to decrease the risks of strokes and heart attacks      Patient Active Problem List   Diagnosis Code    Hyperlipemia E78.5    Insomnia G47.00    History of left hip replacement Z96.642    Depression F32.9    Tobacco abuse Z72.0     Past Surgical History:   Procedure Laterality Date    COLONOSCOPY N/A 8/23/2016    COLONOSCOPY performed by Jihan Zamora MD at [address] HX COLONOSCOPY      HX GI      surgery for hemorrhoids    HX HEENT      all teeth removed    HX ORTHOPAEDIC      left hip replacement    HX ORTHOPAEDIC      infection after sutured finger/then healed/then had a growth removed 2 1/2 yrs later - benign    HX OTHER SURGICAL      growth on left side of testicle removed - benign     Social History     Social History    Marital status: SINGLE     Spouse name: N/A    Number of children: N/A    Years of education: N/A     Occupational History    Not on file.      Social History Main Topics    Smoking status: Current Every Day Smoker     Packs/day: 0.50     Years: 45.00    Smokeless tobacco: Never Used    Alcohol use No    Drug use: No    Sexual activity: No     Other Topics Concern    Not on file     Social History Narrative     Family History   Problem Relation Age of Onset    Cancer Mother      breast    Stroke Mother      x2    Cancer Father      throat/lung    Heart Disease Father     Lung Disease Father     Thyroid Disease Father     Heart Attack Father     Suicide Brother     Psychiatric Disorder Brother     Heart Attack Paternal Uncle     Heart Attack Paternal Uncle      x3     Current Outpatient Prescriptions   Medication Sig    omeprazole (PRILOSEC) 20 mg capsule     varenicline (CHANTIX) 0.5 mg tablet Take 1 Tab by mouth two (2) times a day.  diazePAM (VALIUM) 5 mg tablet TAKE ONE TABLET BY MOUTH EVERY 8 HOURS AS needed max daily AMOUNT 15mg    tiZANidine (ZANAFLEX) 4 mg tablet Take 1 Tab by mouth two (2) times a day for 90 days. Indications: Muscle Spasm    tamsulosin (FLOMAX) 0.4 mg capsule TAKE ONE CAPSULE BY MOUTH EVERY DAY    montelukast (SINGULAIR) 10 mg tablet TAKE ONE TABLET BY MOUTH EVERY DAY FOR 90 DAYS    traZODone (DESYREL) 150 mg tablet TAKE ONE TABLET BY MOUTH NIGHTLY    pravastatin (PRAVACHOL) 40 mg tablet TAKE ONE TABLET BY MOUTH EVERY NIGHT    hydrocortisone (ANUSOL-HC) 2.5 % topical cream Apply  to affected area two (2) times a day. use thin layer     No current facility-administered medications for this visit.       Allergies   Allergen Reactions    Percocet [Oxycodone-Acetaminophen] Rash and Itching     itching       Review of Systems:  Constitutional: Negative for fatigue, malaise  Resp: Negative for cough, wheezing or SOB  CV: Negative for chest pain, dizziness or palpitations  MS: see HPI  Neuro: Negative for HA, weakness or paresthesia  Psych: History of depression, insomnia     Vitals:    03/02/18 1137   BP: 137/78   Pulse: 75   Resp: 18   Temp: 98 °F (36.7 °C)   TempSrc: Oral   SpO2: 99%   Weight: 177 lb (80.3 kg)   Height: 6' (1.829 m) Physical Examination:  General: thin, NAD  Head: Normocephalic, atraumatic  Eyes: Sclera clear, EOMI  Neck: Normal range of motion  Respiratory: symmetrical, unlabored effort  Cardiovascular: Regular rate and rhythm  Extremities:  antalgic gait,  Neurologic: No focal deficits  Psych: affect appropriate    Diagnoses and all orders for this visit:    1. Hyperlipidemia, unspecified hyperlipidemia type  -     LIPID PANEL  -     METABOLIC PANEL, COMPREHENSIVE  -     TSH 3RD GENERATION    2. Spinal stenosis of lumbar region, unspecified whether neurogenic claudication present  -     diazePAM (VALIUM) 5 mg tablet; TAKE ONE TABLET BY MOUTH EVERY 8 HOURS AS needed max daily AMOUNT 15mg  -     tiZANidine (ZANAFLEX) 4 mg tablet; Take 1 Tab by mouth two (2) times a day for 90 days. Indications: Muscle Spasm    3. DDD (degenerative disc disease), lumbar  -     diazePAM (VALIUM) 5 mg tablet; TAKE ONE TABLET BY MOUTH EVERY 8 HOURS AS needed max daily AMOUNT 15mg  -     tiZANidine (ZANAFLEX) 4 mg tablet; Take 1 Tab by mouth two (2) times a day for 90 days. Indications: Muscle Spasm    4. Benign prostatic hyperplasia with nocturia  -     PROSTATE SPECIFIC AG    5. Gastroesophageal reflux disease, esophagitis presence not specified    6. Tobacco abuse  -     varenicline (CHANTIX) 0.5 mg tablet; Take 1 Tab by mouth two (2) times a day. Advised patient to stop all tobacco use. I have discussed the diagnosis with the patient and the intended plan as seen in the above orders. The patient expresses understanding and agreement with our plan of care. The patient has received an after-visit summary. The patient knows to call our office if there are any questions or concerns regarding diagnosis and treatment plans. I have discussed medication side effects and warnings with the patient as well. Follow-up Disposition:  Return in about 6 months (around 9/2/2018), or if symptoms worsen or fail to improve.      The following Annual Medicare Wellness Exam is distinct and separate from the medical evaluation and decision making. This is a Subsequent Medicare Annual Wellness Exam (AWV) (Performed 12 months after IPPE or effective date of Medicare Part B enrollment)    I have reviewed the patient's medical history in detail and updated the computerized patient record. History     Past Medical History:   Diagnosis Date    Adverse effect of anesthesia     \"major fear of needles\"/raw feeling down throat    Arthritis     osteo    Chronic obstructive pulmonary disease (HCC)     Chronic pain     lower back - stenosis - had injections/steroids    Hypercholesterolemia     Ill-defined condition     low BP but not a problem    Psychiatric disorder     depression      Past Surgical History:   Procedure Laterality Date    COLONOSCOPY N/A 8/23/2016    COL[facility] Fed[facility]irca Tipton [facility] ENDOSCOPY    HX COLONOSCOPY      HX GI      surgery for hemorrhoids    HX HEENT      all teeth removed    HX ORTHOPAEDIC      left hip replacement    HX ORTHOPAEDIC      infection after sutured finger/then healed/then had a growth removed 2 1/2 yrs later - benign    HX OTHER SURGICAL      growth on left side of testicle removed - benign     Current Outpatient Prescriptions   Medication Sig Dispense Refill    omeprazole (PRILOSEC) 20 mg capsule       varenicline (CHANTIX) 0.5 mg tablet Take 1 Tab by mouth two (2) times a day. 60 Tab 0    diazePAM (VALIUM) 5 mg tablet TAKE ONE TABLET BY MOUTH EVERY 8 HOURS AS needed max daily AMOUNT 15mg 30 Tab 0    tiZANidine (ZANAFLEX) 4 mg tablet Take 1 Tab by mouth two (2) times a day for 90 days.  Indications: Muscle Spasm 90 Tab 1    tamsulosin (FLOMAX) 0.4 mg capsule TAKE ONE CAPSULE BY MOUTH EVERY DAY 90 Cap 0    montelukast (SINGULAIR) 10 mg tablet TAKE ONE TABLET BY MOUTH EVERY DAY FOR 90 DAYS 30 Tab 3    traZODone (DESYREL) 150 mg tablet TAKE ONE TABLET BY MOUTH NIGHTLY 30 Tab 3    pravastatin (PRAVACHOL) 40 mg tablet TAKE ONE TABLET BY MOUTH EVERY NIGHT 30 Tab 3    hydrocortisone (ANUSOL-HC) 2.5 % topical cream Apply  to affected area two (2) times a day. use thin layer 30 g 0     Allergies   Allergen Reactions    Percocet [Oxycodone-Acetaminophen] Rash and Itching     itching     Family History   Problem Relation Age of Onset    Cancer Mother      breast    Stroke Mother      x2    Cancer Father      throat/lung    Heart Disease Father     Lung Disease Father     Thyroid Disease Father     Heart Attack Father     Suicide Brother     Psychiatric Disorder Brother     Heart Attack Paternal Uncle     Heart Attack Paternal Uncle      x3     Social History   Substance Use Topics    Smoking status: Current Every Day Smoker     Packs/day: 0.50     Years: 45.00    Smokeless tobacco: Never Used    Alcohol use No     Patient Active Problem List   Diagnosis Code    Hyperlipemia E78.5    Insomnia G47.00    History of left hip replacement Z96.642    Depression F32.9    Tobacco abuse Z72.0       Depression Risk Factor Screening:     PHQ over the last two weeks 11/17/2016   Little interest or pleasure in doing things Not at all   Feeling down, depressed or hopeless Not at all   Total Score PHQ 2 0     Alcohol Risk Factor Screening: You do not drink alcohol or very rarely. Functional Ability and Level of Safety:   Hearing Loss  The patient needs hearing aides. Activities of Daily Living  The home contains: no safety equipment. Patient does total self care    Fall Risk  Fall Risk Assessment, last 12 mths 11/17/2016   Able to walk? Yes   Fall in past 12 months?  No       Abuse Screen  Patient is not abused    Cognitive Screening   Evaluation of Cognitive Function:  Has your family/caregiver stated any concerns about your memory: No  Normal    Patient Care Team   Patient Care Team:  Tesfaye Hudson MD as PCP - General (Family Practice)  Karl Cisneros MD (Orthopedic Surgery)  Russel Begum Jaylon Baer MD (Neurosurgery)  Ramone Hilton MD (Orthopedic Surgery)    Assessment/Plan   Education and counseling provided:  Are appropriate based on today's review and evaluation  End-of-Life planning (with patient's consent)    Diagnoses and all orders for this visit:    1. Hyperlipidemia, unspecified hyperlipidemia type  -     LIPID PANEL  -     METABOLIC PANEL, COMPREHENSIVE  -     TSH 3RD GENERATION    2. Spinal stenosis of lumbar region, unspecified whether neurogenic claudication present  -     diazePAM (VALIUM) 5 mg tablet; TAKE ONE TABLET BY MOUTH EVERY 8 HOURS AS needed max daily AMOUNT 15mg  -     tiZANidine (ZANAFLEX) 4 mg tablet; Take 1 Tab by mouth two (2) times a day for 90 days. Indications: Muscle Spasm    3. DDD (degenerative disc disease), lumbar  -     diazePAM (VALIUM) 5 mg tablet; TAKE ONE TABLET BY MOUTH EVERY 8 HOURS AS needed max daily AMOUNT 15mg  -     tiZANidine (ZANAFLEX) 4 mg tablet; Take 1 Tab by mouth two (2) times a day for 90 days. Indications: Muscle Spasm    4. Benign prostatic hyperplasia with nocturia  -     PROSTATE SPECIFIC AG    5. Gastroesophageal reflux disease, esophagitis presence not specified    6. Tobacco abuse  -     varenicline (CHANTIX) 0.5 mg tablet; Take 1 Tab by mouth two (2) times a day. 7. Medicare annual wellness visit, subsequent    8. Screening for alcoholism  -     Annual  Alcohol Screen 15 min ()    9.  Screening for depression  -     Depression Screen Annual        Health Maintenance Due   Topic Date Due    DTaP/Tdap/Td series (1 - Tdap) 12/15/1974  Declined    ZOSTER VACCINE AGE 60>  10/15/2013  Declined    Influenza Age 5 to Adult  08/01/2017  Declined    FOBT Q 1 YEAR AGE 50-75  11/17/2017  Deferred by patient

## 2018-03-02 NOTE — PATIENT INSTRUCTIONS
Learning About Benefits From Quitting Smoking  How does quitting smoking make you healthier? If you're thinking about quitting smoking, you may have a few reasons to be smoke-free. Your health may be one of them. · When you quit smoking, you lower your risks for cancer, lung disease, heart attack, stroke, blood vessel disease, and blindness from macular degeneration. · When you're smoke-free, you get sick less often, and you heal faster. You are less likely to get colds, flu, bronchitis, and pneumonia. · As a nonsmoker, you may find that your mood is better and you are less stressed. When and how will you feel healthier? Quitting has real health benefits that start from day 1 of being smoke-free. And the longer you stay smoke-free, the healthier you get and the better you feel. The first hours  · After just 20 minutes, your blood pressure and heart rate go down. That means there's less stress on your heart and blood vessels. · Within 12 hours, the level of carbon monoxide in your blood drops back to normal. That makes room for more oxygen. With more oxygen in your body, you may notice that you have more energy than when you smoked. After 2 weeks  · Your lungs start to work better. · Your risk of heart attack starts to drop. After 1 month  · When your lungs are clear, you cough less and breathe deeper, so it's easier to be active. · Your sense of taste and smell return. That means you can enjoy food more than you have since you started smoking. Over the years  · After 1 year, your risk of heart disease is half what it would be if you kept smoking. · After 5 years, your risk of stroke starts to shrink. Within a few years after that, it's about the same as if you'd never smoked. · After 10 years, your risk of dying from lung cancer is cut by about half. And your risk for many other types of cancer is lower too. How would quitting help others in your life?   When you quit smoking, you improve the health of everyone who now breathes in your smoke. · Their heart, lung, and cancer risks drop, much like yours. · They are sick less. For babies and small children, living smoke-free means they're less likely to have ear infections, pneumonia, and bronchitis. · If you're a woman who is or will be pregnant someday, quitting smoking means a healthier . · Children who are close to you are less likely to become adult smokers. Where can you learn more? Go to http://catracho-bryce.info/. Enter [id number] [id number] [id number] in the search box to learn more about \"Learning About Benefits From Quitting Smoking. \"  Current as of: 2017  Content Version: 11.4  © 4346-4468 [employer]. Care instructions adapted under license by [employer] (which disclaims liability or warranty for this information). If you have questions about a medical condition or this instruction, always ask your healthcare professional. [place] [address] any warranty or liability for your use of this information. Medicare Wellness Visit, Male    The best way to live healthy is to have a healthy lifestyle by eating a well-balanced diet, exercising regularly, limiting alcohol and stopping smoking. Regular physical exams and screening tests are another way to keep healthy. Preventive exams provided by your health care provider can find health problems before they become diseases or illnesses. Preventive services including immunizations, screening tests, monitoring and exams can help you take care of your own health. All people over age 72 should have a pneumovax  and and a prevnar shot to prevent pneumonia. These are once in a lifetime unless you and your provider decide differently. All people over 65 should have a yearly flu shot and a tetanus vaccine every 10 years. Screening for diabetes mellitus with a blood sugar test should be done every year.     Glaucoma is a disease of the eye due to increased ocular pressure that can lead to blindness and it should be done every year by an eye professional.    Cardiovascular screening tests that check for elevated lipids (fatty part of blood) which can lead to heart disease and strokes should be done every 5 years. Colorectal screening that evaluates for blood or polyps in your colon should be done yearly as a stool test or every five years as a flexible sigmoidoscope or every 10 years as a colonoscopy up to age 76. Men up to age 76 may need a screening blood test for prostate cancer at certain intervals, depending on their personal and family history. This decision is between the patient and his provider. If you have been a smoker or had family history of abdominal aortic aneurysms, you and your provider may decide to schedule an ultrasound test of your aorta. Hepatitis C screening is also recommended for anyone born between [birth year] through [address]. A shingles vaccine is also recommended once in a lifetime after age 61. Your Medicare Wellness Exam is recommended annually. Here is a list of your current Health Maintenance items with a due date:  Health Maintenance Due   Topic Date Due    DTaP/Tdap/Td  (1 - Tdap) 12/15/1974    Shingles Vaccine  10/15/2013    Flu Vaccine  08/01/2017    Stool testing for trace blood  11/17/2017          Advance Directives: Care Instructions  Your Care Instructions  An advance directive is a legal way to state your wishes at the end of your life. It tells your family and your doctor what to do if you can no longer say what you want. There are two main types of advance directives. You can change them any time that your wishes change. · A living will tells your family and your doctor your wishes about life support and other treatment. · A durable power of  for health care lets you name a person to make treatment decisions for you when you can't speak for yourself.  This person is called a health care agent. If you do not have an advance directive, decisions about your medical care may be made by a doctor or a  who doesn't know you. It may help to think of an advance directive as a gift to the people who care for you. If you have one, they won't have to make tough decisions by themselves. Follow-up care is a key part of your treatment and safety. Be sure to make and go to all appointments, and call your doctor if you are having problems. It's also a good idea to know your test results and keep a list of the medicines you take. How can you care for yourself at home? · Discuss your wishes with your loved ones and your doctor. This way, there are no surprises. · Many states have a unique form. Or you might use a universal form that has been approved by many states. This kind of form can sometimes be completed and stored online. Your electronic copy will then be available wherever you have a connection to the Internet. In most cases, doctors will respect your wishes even if you have a form from a different state. · You don't need a  to do an advance directive. But you may want to get legal advice. · Think about these questions when you prepare an advance directive:  ¨ Who do you want to make decisions about your medical care if you are not able to? Many people choose a family member or close friend. ¨ Do you know enough about life support methods that might be used? If not, talk to your doctor so you understand. ¨ What are you most afraid of that might happen? You might be afraid of having pain, losing your independence, or being kept alive by machines. ¨ Where would you prefer to die? Choices include your home, a hospital, or a nursing home. ¨ Would you like to have information about hospice care to support you and your family? ¨ Do you want to donate organs when you die? ¨ Do you want certain [employer] practices performed before you die?  If so, put your wishes in the advance directive. · Read your advance directive every year, and make changes as needed. When should you call for help? Be sure to contact your doctor if you have any questions. Where can you learn more? Go to http://catracho-bryce.info/. Enter R264 in the search box to learn more about \"Advance Directives: Care Instructions. \"  Current as of: September 24, 2016  Content Version: 11.4  © 3995-3784 [employer]. Care instructions adapted under license by [employer] (which disclaims liability or warranty for this information). If you have questions about a medical condition or this instruction, always ask your healthcare professional. [address] any warranty or liability for your use of this information.

## 2018-03-02 NOTE — MR AVS SNAPSHOT
[address] [address] 
[phone] Patient: Judy Denney MRN: [id number] LAURI:[birth year] Visit Information Date & Time Provider Department Dept. Phone Encounter #  
 3/2/2018 11:20 AM Katelynn Cornell  [facility] [phone] [id number] Follow-up Instructions Return in about 6 months (around 9/2/2018), or if symptoms worsen or fail to improve. Upcoming Health Maintenance Date Due DTaP/Tdap/Td series (1 - Tdap) 12/15/1974 ZOSTER VACCINE AGE 60> 10/15/2013 Influenza Age 5 to Adult 8/1/2017 FOBT Q 1 YEAR AGE 50-75 11/17/2017 Pneumococcal 19-64 Medium Risk (1 of 1 - PPSV23) 9/2/2018* *Topic was postponed. The date shown is not the original due date. Allergies as of 3/2/2018  Review Complete On: 3/2/2018 By: Katelynn Cornell MD  
  
 Severity Noted Reaction Type Reactions Percocet [Oxycodone-acetaminophen]  01/28/2015    Rash, Itching  
 itching Current Immunizations  Never Reviewed No immunizations on file. Not reviewed this visit You Were Diagnosed With   
  
 Codes Comments Hyperlipidemia, unspecified hyperlipidemia type    -  Primary ICD-10-CM: E78.5 ICD-9-CM: 272.4 Spinal stenosis of lumbar region, unspecified whether neurogenic claudication present     ICD-10-CM: M48.061 
ICD-9-CM: 724.02   
 DDD (degenerative disc disease), lumbar     ICD-10-CM: M51.36 
ICD-9-CM: 722.52 Benign prostatic hyperplasia with nocturia     ICD-10-CM: N40.1, R35.1 ICD-9-CM: 600.01, 788.43 Gastroesophageal reflux disease, esophagitis presence not specified     ICD-10-CM: K21.9 ICD-9-CM: 530.81 Tobacco abuse     ICD-10-CM: Z72.0 ICD-9-CM: 305.1 Vitals  BP Pulse Temp Resp Height(growth percentile) Weight(growth percentile)  
 137/78 (BP 1 Location: Left arm, BP Patient Position: Sitting) 75 98 °F (36.7 °C) (Oral) 18 6' (1.829 m) 177 lb (80.3 kg) SpO2 BMI Smoking Status 99% 24.01 kg/m2 Current Every Day Smoker Vitals History BMI and BSA Data Body Mass Index Body Surface Area 24.01 kg/m 2 2.02 m 2 Your Updated Medication List  
  
   
This list is accurate as of 3/2/18 12:23 PM.  Always use your most recent med list.  
  
  
  
  
 diazePAM 5 mg tablet Commonly known as:  VALIUM  
TAKE ONE TABLET BY MOUTH EVERY 8 HOURS AS needed max daily AMOUNT 15mg  
  
 hydrocortisone 2.5 % topical cream  
Commonly known as:  ANUSOL-HC Apply  to affected area two (2) times a day. use thin layer  
  
 montelukast 10 mg tablet Commonly known as:  SINGULAIR  
TAKE ONE TABLET BY MOUTH EVERY DAY FOR 90 DAYS  
  
 omeprazole 20 mg capsule Commonly known as:  PRILOSEC  
  
 pravastatin 40 mg tablet Commonly known as:  PRAVACHOL  
TAKE ONE TABLET BY MOUTH EVERY NIGHT  
  
 tamsulosin 0.4 mg capsule Commonly known as:  FLOMAX TAKE ONE CAPSULE BY MOUTH EVERY DAY  
  
 tiZANidine 4 mg tablet Commonly known as:  Jun Owens Take 1 Tab by mouth two (2) times a day for 90 days. Indications: Muscle Spasm  
  
 traZODone 150 mg tablet Commonly known as:  DESYREL  
TAKE ONE TABLET BY MOUTH NIGHTLY  
  
 varenicline 0.5 mg tablet Commonly known as:  Corral Ore Take 1 Tab by mouth two (2) times a day. Prescriptions Printed Refills  
 varenicline (CHANTIX) 0.5 mg tablet 0 Sig: Take 1 Tab by mouth two (2) times a day. Class: Print Route: Oral  
 diazePAM (VALIUM) 5 mg tablet 0 Sig: TAKE ONE TABLET BY MOUTH EVERY 8 HOURS AS needed max daily AMOUNT 15mg Class: Print  
 tiZANidine (ZANAFLEX) 4 mg tablet 1 Sig: Take 1 Tab by mouth two (2) times a day for 90 days. Indications: Muscle Spasm Class: Print Route: Oral  
  
We Performed the Following LIPID PANEL [28507 CPT(R)] METABOLIC PANEL, COMPREHENSIVE [92448 CPT(R)] PSA, DIAGNOSTIC (PROSTATE SPECIFIC AG) U194474 CPT(R)] TSH 3RD GENERATION [36850 CPT(R)] Follow-up Instructions Return in about 6 months (around 9/2/2018), or if symptoms worsen or fail to improve. Patient Instructions Learning About Benefits From Quitting Smoking How does quitting smoking make you healthier? If you're thinking about quitting smoking, you may have a few reasons to be smoke-free. Your health may be one of them. · When you quit smoking, you lower your risks for cancer, lung disease, heart attack, stroke, blood vessel disease, and blindness from macular degeneration. · When you're smoke-free, you get sick less often, and you heal faster. You are less likely to get colds, flu, bronchitis, and pneumonia. · As a nonsmoker, you may find that your mood is better and you are less stressed. When and how will you feel healthier? Quitting has real health benefits that start from day 1 of being smoke-free. And the longer you stay smoke-free, the healthier you get and the better you feel. The first hours · After just 20 minutes, your blood pressure and heart rate go down. That means there's less stress on your heart and blood vessels. · Within 12 hours, the level of carbon monoxide in your blood drops back to normal. That makes room for more oxygen. With more oxygen in your body, you may notice that you have more energy than when you smoked. After 2 weeks · Your lungs start to work better. · Your risk of heart attack starts to drop. After 1 month · When your lungs are clear, you cough less and breathe deeper, so it's easier to be active. · Your sense of taste and smell return. That means you can enjoy food more than you have since you started smoking. Over the years · After 1 year, your risk of heart disease is half what it would be if you kept smoking. · After 5 years, your risk of stroke starts to shrink.  Within a few years after that, it's about the same as if you'd never smoked. · After 10 years, your risk of dying from lung cancer is cut by about half. And your risk for many other types of cancer is lower too. How would quitting help others in your life? When you quit smoking, you improve the health of everyone who now breathes in your smoke. · Their heart, lung, and cancer risks drop, much like yours. · They are sick less. For babies and small children, living smoke-free means they're less likely to have ear infections, pneumonia, and bronchitis. · If you're a woman who is or will be pregnant someday, quitting smoking means a healthier . · Children who are close to you are less likely to become adult smokers. Where can you learn more? Go to http://catracho-bryce.info/. Enter [id number] [id number] in the search box to learn more about \"Learning About Benefits From Quitting Smoking. \" Current as of: 2017 Content Version: 11.4 © 7771-1985 [employer]. Care instructions adapted under license by [employer] (which disclaims liability or warranty for this information). If you have questions about a medical condition or this instruction, always ask your healthcare professional. [place] [address] any warranty or liability for your use of this information. Introducing [facility] & HEALTH SERVICES! Vicente Delgadillo introduces [employer] patient portal. Now you can access parts of your medical record, email your doctor's office, and request medication refills online. 1. In your internet browser, go to [email or web address] 2. Click on the First Time User? Click Here link in the Sign In box. You will see the New Member Sign Up page. 3. Enter your [employer] Access Code exactly as it appears below. You will not need to use this code after youve completed the sign-up process. If you do not sign up before the expiration date, you must request a new code. · [employer] Access Code: [email or web address] Expires: 5/31/2018 10:51 AM 
 
4. Enter the last four digits of your Social Security Number (xxxx) and Date of Birth (mm/dd/yyyy) as indicated and click Submit. You will be taken to the next sign-up page. 5. Create a [employer] ID. This will be your [employer] login ID and cannot be changed, so think of one that is secure and easy to remember. 6. Create a [employer] password. You can change your password at any time. 7. Enter your Password Reset Question and Answer. This can be used at a later time if you forget your password. 8. Enter your e-mail address. You will receive e-mail notification when new information is available in [address] 9. Click Sign Up. You can now view and download portions of your medical record. 10. Click the Download Summary menu link to download a portable copy of your medical information. If you have questions, please visit the Frequently Asked Questions section of the [employer] website. Remember, [employer] is NOT to be used for urgent needs. For medical emergencies, dial 911. Now available from your iPhone and Android! Please provide this summary of care documentation to your next provider. Your primary care clinician is listed as Daniel Quispe. If you have any questions after today's visit, please call [phone].

## 2018-03-03 LAB
ALBUMIN SERPL-MCNC: 4.6 G/DL (ref 3.6–4.8)
ALBUMIN/GLOB SERPL: 1.8 {RATIO} (ref 1.2–2.2)
ALP SERPL-CCNC: 109 IU/L (ref 39–117)
ALT SERPL-CCNC: 10 IU/L (ref 0–44)
AST SERPL-CCNC: 14 IU/L (ref 0–40)
BILIRUB SERPL-MCNC: 0.5 MG/DL (ref 0–1.2)
BUN SERPL-MCNC: 8 MG/DL (ref 8–27)
BUN/CREAT SERPL: 9 (ref 10–24)
CALCIUM SERPL-MCNC: 9.6 MG/DL (ref 8.6–10.2)
CHLORIDE SERPL-SCNC: 103 MMOL/L (ref 96–106)
CHOLEST SERPL-MCNC: 199 MG/DL (ref 100–199)
CO2 SERPL-SCNC: 20 MMOL/L (ref 18–29)
CREAT SERPL-MCNC: 0.89 MG/DL (ref 0.76–1.27)
GFR SERPLBLD CREATININE-BSD FMLA CKD-EPI: 104 ML/MIN/1.73
GFR SERPLBLD CREATININE-BSD FMLA CKD-EPI: 90 ML/MIN/1.73
GLOBULIN SER CALC-MCNC: 2.5 G/DL (ref 1.5–4.5)
GLUCOSE SERPL-MCNC: 80 MG/DL (ref 65–99)
HDLC SERPL-MCNC: 36 MG/DL
LDLC SERPL CALC-MCNC: 141 MG/DL (ref 0–99)
POTASSIUM SERPL-SCNC: 4.2 MMOL/L (ref 3.5–5.2)
PROT SERPL-MCNC: 7.1 G/DL (ref 6–8.5)
PSA SERPL-MCNC: 4.9 NG/ML (ref 0–4)
SODIUM SERPL-SCNC: 142 MMOL/L (ref 134–144)
TRIGL SERPL-MCNC: 111 MG/DL (ref 0–149)
TSH SERPL DL<=0.005 MIU/L-ACNC: 2.34 UIU/ML (ref 0.45–4.5)
VLDLC SERPL CALC-MCNC: 22 MG/DL (ref 5–40)

## 2018-04-21 DIAGNOSIS — R06.2 WHEEZING: ICD-10-CM

## 2018-04-23 RX ORDER — ALBUTEROL SULFATE 90 UG/1
AEROSOL, METERED RESPIRATORY (INHALATION)
Qty: 1 INHALER | Refills: 1 | OUTPATIENT
Start: 2018-04-23 | End: 2018-07-30 | Stop reason: SDUPTHER

## 2018-04-23 RX ORDER — ALBUTEROL SULFATE 90 UG/1
AEROSOL, METERED RESPIRATORY (INHALATION)
Qty: 1 INHALER | Refills: 1 | Status: SHIPPED | OUTPATIENT
Start: 2018-04-23 | End: 2018-04-23 | Stop reason: SDUPTHER

## 2018-04-25 RX ORDER — MONTELUKAST SODIUM 10 MG/1
TABLET ORAL
Qty: 30 TAB | OUTPATIENT
Start: 2018-04-25

## 2018-04-25 RX ORDER — TRAZODONE HYDROCHLORIDE 150 MG/1
TABLET ORAL
Qty: 30 TAB | OUTPATIENT
Start: 2018-04-25

## 2018-04-25 RX ORDER — PRAVASTATIN SODIUM 40 MG/1
TABLET ORAL
Qty: 30 TAB | OUTPATIENT
Start: 2018-04-25

## 2018-04-25 RX ORDER — TAMSULOSIN HYDROCHLORIDE 0.4 MG/1
CAPSULE ORAL
Qty: 90 CAP | OUTPATIENT
Start: 2018-04-25

## 2018-05-02 DIAGNOSIS — M48.061 SPINAL STENOSIS OF LUMBAR REGION, UNSPECIFIED WHETHER NEUROGENIC CLAUDICATION PRESENT: ICD-10-CM

## 2018-05-02 DIAGNOSIS — M51.36 DDD (DEGENERATIVE DISC DISEASE), LUMBAR: ICD-10-CM

## 2018-05-04 RX ORDER — TIZANIDINE 4 MG/1
TABLET ORAL
Qty: 90 TAB | OUTPATIENT
Start: 2018-05-04

## 2018-05-08 DIAGNOSIS — M48.061 SPINAL STENOSIS OF LUMBAR REGION, UNSPECIFIED WHETHER NEUROGENIC CLAUDICATION PRESENT: ICD-10-CM

## 2018-05-08 DIAGNOSIS — M51.36 DDD (DEGENERATIVE DISC DISEASE), LUMBAR: ICD-10-CM

## 2018-05-08 RX ORDER — TIZANIDINE 4 MG/1
4 TABLET ORAL 2 TIMES DAILY
Qty: 180 TAB | Refills: 1 | Status: SHIPPED | OUTPATIENT
Start: 2018-05-08 | End: 2018-09-18 | Stop reason: SDUPTHER

## 2018-05-08 NOTE — TELEPHONE ENCOUNTER
Pt called requesting a refill on his muscle relaxer medication. He is asking if the nurse can call him [phone]. He wants to talk about his muscle relaxer medication.

## 2018-05-10 RX ORDER — TAMSULOSIN HYDROCHLORIDE 0.4 MG/1
CAPSULE ORAL
Qty: 90 CAP | Refills: 1 | Status: SHIPPED | OUTPATIENT
Start: 2018-05-10 | End: 2018-08-03 | Stop reason: SDUPTHER

## 2018-05-24 ENCOUNTER — OFFICE VISIT (OUTPATIENT)
Dept: FAMILY MEDICINE CLINIC | Age: 63
End: 2018-05-24

## 2018-05-24 VITALS
RESPIRATION RATE: 20 BRPM | TEMPERATURE: 97.1 F | SYSTOLIC BLOOD PRESSURE: 137 MMHG | OXYGEN SATURATION: 96 % | DIASTOLIC BLOOD PRESSURE: 82 MMHG | HEIGHT: 72 IN | BODY MASS INDEX: 23.16 KG/M2 | HEART RATE: 86 BPM | WEIGHT: 171 LBS

## 2018-05-24 DIAGNOSIS — Z96.642 HISTORY OF LEFT HIP REPLACEMENT: ICD-10-CM

## 2018-05-24 DIAGNOSIS — M25.551 RIGHT HIP PAIN: ICD-10-CM

## 2018-05-24 DIAGNOSIS — M51.36 DDD (DEGENERATIVE DISC DISEASE), LUMBAR: ICD-10-CM

## 2018-05-24 DIAGNOSIS — M48.061 SPINAL STENOSIS OF LUMBAR REGION, UNSPECIFIED WHETHER NEUROGENIC CLAUDICATION PRESENT: Primary | ICD-10-CM

## 2018-05-24 RX ORDER — HYDROCODONE BITARTRATE AND ACETAMINOPHEN 10; 325 MG/1; MG/1
1 TABLET ORAL
COMMUNITY
End: 2018-05-24

## 2018-05-24 RX ORDER — PREDNISONE 10 MG/1
TABLET ORAL
Qty: 21 TAB | Refills: 0 | Status: SHIPPED | OUTPATIENT
Start: 2018-05-24 | End: 2018-07-25

## 2018-05-24 RX ORDER — KETOROLAC TROMETHAMINE 30 MG/ML
30 INJECTION, SOLUTION INTRAMUSCULAR; INTRAVENOUS ONCE
Qty: 1 VIAL | Refills: 0
Start: 2018-05-24 | End: 2018-05-24

## 2018-05-24 RX ORDER — DIAZEPAM 5 MG/1
TABLET ORAL
Qty: 30 TAB | Refills: 0 | Status: SHIPPED | OUTPATIENT
Start: 2018-05-24 | End: 2018-07-25 | Stop reason: SDUPTHER

## 2018-05-24 NOTE — PROGRESS NOTES
1. Have you been to the ER, urgent care clinic, or been hospitalized since your last visit? No     2. Have you seen or consulted any other health care providers outside of the [address] [place] [address] since your last visit? Include any pap smears or colon screening.     Reviewed record in preparation for visit and have necessary documentation  Pt did not bring medication to office visit for review  Information was given to pt on Advanced Directives, Living Will  Information was given on Shingles Vaccine  opportunity was given for questions  Goals that were addressed and/or need to be completed during or after this appointment include       Health Maintenance Due   Topic Date Due    DTaP/Tdap/Td series (1 - Tdap) 12/15/1974    ZOSTER VACCINE AGE 60>  10/15/2013    FOBT Q 1 YEAR AGE 50-75  11/17/2017

## 2018-05-24 NOTE — MR AVS SNAPSHOT
[address] [address] [address] [address] 
[phone] Patient: Benjamin Quintero MRN: [id number] IFQ:[birth year] Visit Information Date & Time Provider Department Dept. Phone Encounter #  
 5/24/2018  1:20 PM Joshua Gordillo MD  [facility] [id number] Upcoming Health Maintenance Date Due DTaP/Tdap/Td series (1 - Tdap) 12/15/1974 ZOSTER VACCINE AGE 60> 10/15/2013 FOBT Q 1 YEAR AGE 50-75 11/17/2017 Pneumococcal 19-64 Medium Risk (1 of 1 - PPSV23) 9/2/2018* Influenza Age 5 to Adult 8/1/2018 MEDICARE YEARLY EXAM 3/3/2019 *Topic was postponed. The date shown is not the original due date. Allergies as of 5/24/2018  Review Complete On: 3/2/2018 By: Joshua Gordillo MD  
  
 Severity Noted Reaction Type Reactions Percocet [Oxycodone-acetaminophen]  01/28/2015    Rash, Itching  
 itching Current Immunizations  Never Reviewed No immunizations on file. Not reviewed this visit You Were Diagnosed With   
  
 Codes Comments Spinal stenosis of lumbar region, unspecified whether neurogenic claudication present    -  Primary ICD-10-CM: M48.061 
ICD-9-CM: 724.02   
 DDD (degenerative disc disease), lumbar     ICD-10-CM: M51.36 
ICD-9-CM: 722.52 Right hip pain     ICD-10-CM: M25.551 ICD-9-CM: 719.45 History of left hip replacement     ICD-10-CM: K93.923 ICD-9-CM: V43.64 Vitals BP Pulse Temp Resp Height(growth percentile) Weight(growth percentile) 137/82 (BP 1 Location: Right arm, BP Patient Position: Sitting) 86 97.1 °F (36.2 °C) (Oral) 20 6' (1.829 m) 171 lb (77.6 kg) SpO2 BMI Smoking Status 96% 23.19 kg/m2 Current Every Day Smoker Vitals History BMI and BSA Data Body Mass Index Body Surface Area  
 23.19 kg/m 2 1.99 m 2 Preferred Pharmacy Pharmacy Name Phone [facility] [address] [phone] Your Updated Medication List  
  
   
This list is accurate as of 5/24/18  2:47 PM.  Always use your most recent med list.  
  
  
  
  
 albuterol 90 mcg/actuation inhaler Commonly known as:  PROVENTIL HFA, VENTOLIN HFA, PROAIR HFA Ventolin HFA. Use 2 puffs every 6 hours as needed for wheezing. diazePAM 5 mg tablet Commonly known as:  VALIUM  
TAKE ONE TABLET BY MOUTH EVERY 8 HOURS AS needed max daily AMOUNT 15mg  
  
 hydrocortisone 2.5 % topical cream  
Commonly known as:  ANUSOL-HC Apply  to affected area two (2) times a day. use thin layer  
  
 ketorolac 30 mg/mL (1 mL) injection Commonly known as:  TORADOL  
1 mL by IntraVENous route once for 1 dose. montelukast 10 mg tablet Commonly known as:  SINGULAIR  
TAKE ONE TABLET BY MOUTH EVERY DAY FOR 90 DAYS  
  
 omeprazole 20 mg capsule Commonly known as:  PRILOSEC  
  
 pravastatin 40 mg tablet Commonly known as:  PRAVACHOL  
TAKE ONE TABLET BY MOUTH EVERY NIGHT  
  
 predniSONE 10 mg dose pack Commonly known as:  STERAPRED DS See administration instruction per 10mg dose pack  
  
 tamsulosin 0.4 mg capsule Commonly known as:  FLOMAX TAKE ONE CAPSULE BY MOUTH EVERY DAY  
  
 tiZANidine 4 mg tablet Commonly known as:  Molinda Staci Take 1 Tab by mouth two (2) times a day for 90 days. Indications: Muscle Spasm  
  
 traZODone 150 mg tablet Commonly known as:  DESYREL  
TAKE ONE TABLET BY MOUTH NIGHTLY Prescriptions Printed Refills  
 diazePAM (VALIUM) 5 mg tablet 0 Sig: TAKE ONE TABLET BY MOUTH EVERY 8 HOURS AS needed max daily AMOUNT 15mg Class: Print Prescriptions Sent to Pharmacy Refills  
 predniSONE (STERAPRED DS) 10 mg dose pack 0 Sig: See administration instruction per 10mg dose pack  Class: Normal  
 Pharmacy: [facility], [address] [place] [address], [facility] [facility] #: [phone] We Performed the Following KETOROLAC TROMETHAMINE INJ [ [facility]] MI THER/PROPH/DIAG INJECTION, SUBCUT/IM [id number] CPT(R)] REFERRAL TO ORTHOPEDIC SURGERY [REF62 Custom] Comments:  
 Chronic LBP. Dr. Pramod Ruiz Has seen previously for same. To-Do List   
 05/24/2018 Imaging:  XR HIP RT W OR WO PELV 2-3 VWS Referral Information Referral ID Referred By Referred To  
  
 [id number] Nadya RAMIREZ Not Available Visits Status Start Date End Date 1 New Request 5/24/18 5/24/19 If your referral has a status of pending review or denied, additional information will be sent to support the outcome of this decision. Introducing [facility] & HEALTH SERVICES! [employer] introduces [employer] patient portal. Now you can access parts of your medical record, email your doctor's office, and request medication refills online. 1. In your internet browser, go to [email or web address] 2. Click on the First Time User? Click Here link in the Sign In box. You will see the New Member Sign Up page. 3. Enter your [employer] Access Code exactly as it appears below. You will not need to use this code after youve completed the sign-up process. If you do not sign up before the expiration date, you must request a new code. · [employer] Access Code: [email or web address] Expires: 5/31/2018 11:51 AM 
 
4. Enter the last four digits of your Social Security Number (xxxx) and Date of Birth (mm/dd/yyyy) as indicated and click Submit. You will be taken to the next sign-up page. 5. Create a [employer] ID. This will be your [employer] login ID and cannot be changed, so think of one that is secure and easy to remember. 6. Create a [employer] password. You can change your password at any time. 7. Enter your Password Reset Question and Answer. This can be used at a later time if you forget your password. 8. Enter your e-mail address. You will receive e-mail notification when new information is available in [address] 9. Click Sign Up. You can now view and download portions of your medical record. 10. Click the Download Summary menu link to download a portable copy of your medical information. If you have questions, please visit the Frequently Asked Questions section of the [employer] website. Remember, [employer] is NOT to be used for urgent needs. For medical emergencies, dial 911. Now available from your iPhone and Android! Please provide this summary of care documentation to your next provider. Your primary care clinician is listed as Elba Reed. If you have any questions after today's visit, please call [phone].

## 2018-05-30 NOTE — PATIENT INSTRUCTIONS
Learning About Degenerative Disc Disease  What is degenerative disc disease? Degenerative disc disease is not really a disease. It's a term used to describe the normal changes in your spinal discs as you age. Spinal discs are small, spongy discs that separate the bones (vertebrae) that make up the spine. The discs act as shock absorbers for the spine, so it can flex, bend, and twist.  Degenerative disc disease can take place in one or more places along the spine. It most often occurs in the discs in the lower back and the neck. What causes it? As we age, our spinal discs break down, or degenerate. This breakdown causes the symptoms of degenerative disc disease in some people. When the discs break down, they can lose fluid and dry out, and their outer layers can have tiny cracks or tears. This leads to less padding and less space between the bones in the spine. The body reacts to this by making bony growths on the spine called bone spurs. These spurs can press on the spinal nerve roots or spinal cord. This can cause pain and can affect how well the nerves work. What are the symptoms? Many people with degenerative disc disease have no pain. But others have severe pain or other symptoms that limit their activities. Some of the most common symptoms are:  · Pain in the back or neck. Where the pain occurs depends on which discs are affected. · Pain that gets worse when you move, such as bending over, reaching up, or twisting. · Pain that may occur in the rear end (buttocks), arm, or leg if a nerve is pinched. · Numbness or tingling in your arm or leg. How is it diagnosed? A doctor can often diagnose degenerative disc disease while doing a physical exam. If your exam shows no signs of a serious condition, imaging tests (such as an X-ray) aren't likely to help your doctor find the cause of your symptoms.   Sometimes degenerative disc disease is found when an X-ray is taken for another reason, such as an injury or other health problem. But even if the doctor finds degenerative disc disease, that doesn't always mean that you will have symptoms. How is it treated? There are several things you can do at home to manage pain from this problem. · To relieve pain, use ice or heat (whichever feels better) on the affected area. ¨ Put ice or a cold pack on the area for 10 to 20 minutes at a time. Put a thin cloth between the ice and your skin. ¨ Put a warm water bottle, a heating pad set on low, or a warm cloth on your back. Put a thin cloth between the heating pad and your skin. Do not go to sleep with a heating pad on your skin. · Ask your doctor if you can take acetaminophen (such as Tylenol) or nonsteroidal anti-inflammatory drugs, such as ibuprofen or naproxen. Your doctor can prescribe stronger medicines if needed. Be safe with medicines. Read and follow all instructions on the label. · Get some exercise every day. Exercise is one of the best ways to help your back feel better and stay better. It's best to start each exercise slowly. You may notice a little soreness, and that's okay. But if an exercise makes your pain worse, stop doing it. Here are things you can try:  ¨ Walking. It's the simplest and maybe the best activity for your back. It gets your blood moving and helps your muscles stay strong. ¨ Exercises that gently stretch and strengthen your stomach, back, and leg muscles. The stronger those muscles are, the better they're able to protect your back. If you have constant or severe pain in your back or spine, you may need other treatments, such as physical therapy. In some cases, your doctor may suggest surgery. Follow-up care is a key part of your treatment and safety. Be sure to make and go to all appointments, and call your doctor if you are having problems. It's also a good idea to know your test results and keep a list of the medicines you take. Where can you learn more?   Go to http://catracho-bryce.info/. Enter K120 in the search box to learn more about \"Learning About Degenerative Disc Disease. \"  Current as of: March 21, 2017  Content Version: 11.4  © 0487-6273 Healthwise, Incorporated. Care instructions adapted under license by [employer] (which disclaims liability or warranty for this information). If you have questions about a medical condition or this instruction, always ask your healthcare professional. [place] [address] any warranty or liability for your use of this information.

## 2018-05-30 NOTE — PROGRESS NOTES
Chief Complaint   Patient presents with    Hip Pain     Pain is severe- 10/10 Right Hip     LOW BACK PAIN    Leg Pain     Right Leg      he is a 57y.o. year old male who presents with complaint of acute on chronic lower back with radiculopathy. He has hx of left total hip replacement and chronic LBP. Patient has previously been seen by ortho, neurology and pain management. He says he wants referral back to oro for lumbar injections. He continues to smoke. BP Readings from Last 3 Encounters:   05/24/18 137/82   03/02/18 137/78   08/28/17 129/88     Wt Readings from Last 3 Encounters:   05/24/18 171 lb (77.6 kg)   03/02/18 177 lb (80.3 kg)   08/28/17 164 lb 12.8 oz (74.8 kg)     Body mass index is 23.19 kg/(m^2). Patient Active Problem List   Diagnosis Code    Hyperlipemia E78.5    Insomnia G47.00    History of left hip replacement Z96.642    Depression F32.9    Tobacco abuse Z72.0     Past Surgical History:   Procedure Laterality Date    COLONOSCOPY N/A 8/23/2016    COLONOSCOPY performed by Dwaine Lee MD at [address] HX COLONOSCOPY      HX GI      surgery for hemorrhoids    HX HEENT      all teeth removed    HX ORTHOPAEDIC      left hip replacement    HX ORTHOPAEDIC      infection after sutured finger/then healed/then had a growth removed 2 1/2 yrs later - benign    HX OTHER SURGICAL      growth on left side of testicle removed - benign     Social History     Social History    Marital status: SINGLE     Spouse name: N/A    Number of children: N/A    Years of education: N/A     Occupational History    Not on file.      Social History Main Topics    Smoking status: Current Every Day Smoker     Packs/day: 0.50     Years: 45.00    Smokeless tobacco: Never Used    Alcohol use No    Drug use: No    Sexual activity: No     Other Topics Concern    Not on file     Social History Narrative     Family History   Problem Relation Age of Onset    Cancer Mother      breast    Stroke Mother      x2    Cancer Father      throat/lung    Heart Disease Father     Lung Disease Father     Thyroid Disease Father     Heart Attack Father     Suicide Brother     Psychiatric Disorder Brother     Heart Attack Paternal Uncle     Heart Attack Paternal Uncle      x3     Current Outpatient Prescriptions   Medication Sig    diazePAM (VALIUM) 5 mg tablet TAKE ONE TABLET BY MOUTH EVERY 8 HOURS AS needed max daily AMOUNT 15mg    predniSONE (STERAPRED DS) 10 mg dose pack See administration instruction per 10mg dose pack    traZODone (DESYREL) 150 mg tablet TAKE ONE TABLET BY MOUTH NIGHTLY    pravastatin (PRAVACHOL) 40 mg tablet TAKE ONE TABLET BY MOUTH EVERY NIGHT    montelukast (SINGULAIR) 10 mg tablet TAKE ONE TABLET BY MOUTH EVERY DAY FOR 90 DAYS    tamsulosin (FLOMAX) 0.4 mg capsule TAKE ONE CAPSULE BY MOUTH EVERY DAY    tiZANidine (ZANAFLEX) 4 mg tablet Take 1 Tab by mouth two (2) times a day for 90 days. Indications: Muscle Spasm    albuterol (PROVENTIL HFA, VENTOLIN HFA, PROAIR HFA) 90 mcg/actuation inhaler Ventolin HFA. Use 2 puffs every 6 hours as needed for wheezing.  omeprazole (PRILOSEC) 20 mg capsule     hydrocortisone (ANUSOL-HC) 2.5 % topical cream Apply  to affected area two (2) times a day. use thin layer     No current facility-administered medications for this visit.       Allergies   Allergen Reactions    Percocet [Oxycodone-Acetaminophen] Rash and Itching     itching       Review of Systems:  Constitutional: Negative for fatigue, malaise  Resp: Negative for cough, wheezing or SOB  CV: Negative for chest pain, dizziness or palpitations  MS: see HPI  Neuro: Negative for HA, weakness or paresthesia  Psych: History of depression, insomnia     Vitals:    05/24/18 1334   BP: 137/82   Pulse: 86   Resp: 20   Temp: 97.1 °F (36.2 °C)   TempSrc: Oral   SpO2: 96%   Weight: 171 lb (77.6 kg)   Height: 6' (1.829 m)       Physical Examination:  General: thin, appears uncomfortable  Head: Normocephalic, atraumatic  Eyes: Sclera clear, EOMI  Neck: Normal range of motion  Respiratory: symmetrical, unlabored effort  Cardiovascular: Regular rate and rhythm  Extremities: right lumbar paraspinal muscle TTP, right hip with FROM, straight leg raise positive, antalgic gait,  Neurologic: Normal strength and sensation  Psych: affect appropriate    Diagnoses and all orders for this visit:    1. Spinal stenosis of lumbar region, unspecified whether neurogenic claudication present  -     REFERRAL TO ORTHOPEDIC SURGERY  -     diazePAM (VALIUM) 5 mg tablet; TAKE ONE TABLET BY MOUTH EVERY 8 HOURS AS needed max daily AMOUNT 15mg  -     predniSONE (STERAPRED DS) 10 mg dose pack; See administration instruction per 10mg dose pack  -     ketorolac (TORADOL) 30 mg/mL (1 mL) injection; 1 mL by IntraVENous route once for 1 dose. -     KETOROLAC TROMETHAMINE INJ  -     MI THER/PROPH/DIAG INJECTION, SUBCUT/IM    2. DDD (degenerative disc disease), lumbar  -     REFERRAL TO ORTHOPEDIC SURGERY  -     diazePAM (VALIUM) 5 mg tablet; TAKE ONE TABLET BY MOUTH EVERY 8 HOURS AS needed max daily AMOUNT 15mg  -     predniSONE (STERAPRED DS) 10 mg dose pack; See administration instruction per 10mg dose pack  -     ketorolac (TORADOL) 30 mg/mL (1 mL) injection; 1 mL by IntraVENous route once for 1 dose. -     KETOROLAC TROMETHAMINE INJ  -     MI THER/PROPH/DIAG INJECTION, SUBCUT/IM    3. Right hip pain  -     XR HIP RT W OR WO PELV 2-3 VWS; Future    4. History of left hip replacement      Plan of care:  Diagnoses were discussed in detail with patient. Medication risks/benefits/side effects discussed with patient. Advised patient to stop all tobacco use. All of the patient's questions were addressed and answered to apparent satisfaction. The patient understands and agrees with our plan of care. The patient knows to call back if they have questions about the plan of care or if symptoms change.   The patient received an After-Visit Summary which contains VS, diagnoses, orders, allergy and medication lists. Future Appointments  Date Time Provider Marzena Chopra   7/25/2018 8:40 AM Roscoe Koroma MD [facility]         Follow-up Disposition:  Return in about 4 weeks (around 6/21/2018), or if symptoms worsen or fail to improve.

## 2018-07-25 ENCOUNTER — OFFICE VISIT (OUTPATIENT)
Dept: FAMILY MEDICINE CLINIC | Age: 63
End: 2018-07-25

## 2018-07-25 VITALS
DIASTOLIC BLOOD PRESSURE: 94 MMHG | HEIGHT: 72 IN | BODY MASS INDEX: 23.03 KG/M2 | OXYGEN SATURATION: 96 % | WEIGHT: 170 LBS | TEMPERATURE: 98.2 F | SYSTOLIC BLOOD PRESSURE: 142 MMHG | HEART RATE: 72 BPM | RESPIRATION RATE: 20 BRPM

## 2018-07-25 DIAGNOSIS — M48.061 SPINAL STENOSIS OF LUMBAR REGION, UNSPECIFIED WHETHER NEUROGENIC CLAUDICATION PRESENT: ICD-10-CM

## 2018-07-25 DIAGNOSIS — E78.5 HYPERLIPIDEMIA, UNSPECIFIED HYPERLIPIDEMIA TYPE: Primary | ICD-10-CM

## 2018-07-25 DIAGNOSIS — F51.01 PRIMARY INSOMNIA: ICD-10-CM

## 2018-07-25 DIAGNOSIS — R97.20 ELEVATED PSA: ICD-10-CM

## 2018-07-25 DIAGNOSIS — H90.0 CONDUCTIVE HEARING LOSS, BILATERAL: ICD-10-CM

## 2018-07-25 DIAGNOSIS — M51.36 DDD (DEGENERATIVE DISC DISEASE), LUMBAR: ICD-10-CM

## 2018-07-25 RX ORDER — DIAZEPAM 5 MG/1
TABLET ORAL
Qty: 30 TAB | Refills: 0 | Status: SHIPPED | OUTPATIENT
Start: 2018-07-25 | End: 2018-08-20 | Stop reason: SDUPTHER

## 2018-07-25 NOTE — PATIENT INSTRUCTIONS

## 2018-07-25 NOTE — MR AVS SNAPSHOT
[address] [address] 
[phone] Patient: Erika Sosa MRN: [id number] Visit Information Date & Time Provider Department Dept. Phone Encounter #  
 7/25/2018  8:40 AM Henna Jackson MD [address] [id number] Upcoming Health Maintenance Date Due DTaP/Tdap/Td series (1 - Tdap) 12/15/1974 ZOSTER VACCINE AGE 60> 10/15/2013 FOBT Q 1 YEAR AGE 50-75 11/17/2017 Pneumococcal 19-64 Medium Risk (1 of 1 - PPSV23) 9/2/2018* Influenza Age 5 to Adult 8/1/2018 MEDICARE YEARLY EXAM 3/3/2019 *Topic was postponed. The date shown is not the original due date. Allergies as of 7/25/2018  Review Complete On: 7/25/2018 By: Henna Jackson MD  
  
 Severity Noted Reaction Type Reactions Percocet [Oxycodone-acetaminophen]  01/28/2015    Rash, Itching  
 itching Current Immunizations  Never Reviewed No immunizations on file. Not reviewed this visit You Were Diagnosed With   
  
 Codes Comments Hyperlipidemia, unspecified hyperlipidemia type    -  Primary ICD-10-CM: E78.5 ICD-9-CM: 272.4 Primary insomnia     ICD-10-CM: F51.01 
ICD-9-CM: 307.42 Conductive hearing loss, bilateral     ICD-10-CM: H90.0 ICD-9-CM: 389.06 Elevated PSA     ICD-10-CM: R97.20 ICD-9-CM: 790.93 Spinal stenosis of lumbar region, unspecified whether neurogenic claudication present     ICD-10-CM: M48.061 
ICD-9-CM: 724.02   
 DDD (degenerative disc disease), lumbar     ICD-10-CM: M51.36 
ICD-9-CM: 722.52 Vitals BP Pulse Temp Resp Height(growth percentile) Weight(growth percentile) (!) 142/94 72 98.2 °F (36.8 °C) (Oral) 20 6' (1.829 m) 170 lb (77.1 kg) SpO2 BMI Smoking Status 96% 23.06 kg/m2 Current Every Day Smoker Vitals History BMI and BSA Data Body Mass Index Body Surface Area  23.06 kg/m 2 1.98 m 2  
  
 Preferred Pharmacy Pharmacy Name Phone [facility] [address] [phone] Your Updated Medication List  
  
   
This list is accurate as of 7/25/18  9:27 AM.  Always use your most recent med list.  
  
  
  
  
 albuterol 90 mcg/actuation inhaler Commonly known as:  PROVENTIL HFA, VENTOLIN HFA, PROAIR HFA Ventolin HFA. Use 2 puffs every 6 hours as needed for wheezing. diazePAM 5 mg tablet Commonly known as:  VALIUM  
TAKE ONE TABLET BY MOUTH EVERY 8 HOURS AS needed max daily AMOUNT 15mg  
  
 montelukast 10 mg tablet Commonly known as:  SINGULAIR  
TAKE ONE TABLET BY MOUTH EVERY DAY FOR 90 DAYS  
  
 omeprazole 20 mg capsule Commonly known as:  PRILOSEC  
  
 pravastatin 40 mg tablet Commonly known as:  PRAVACHOL  
TAKE ONE TABLET BY MOUTH EVERY NIGHT  
  
 tamsulosin 0.4 mg capsule Commonly known as:  FLOMAX TAKE ONE CAPSULE BY MOUTH EVERY DAY  
  
 tiZANidine 4 mg tablet Commonly known as:  Keysha Banter Take 1 Tab by mouth two (2) times a day for 90 days. Indications: Muscle Spasm  
  
 traZODone 150 mg tablet Commonly known as:  DESYREL  
TAKE ONE TABLET BY MOUTH NIGHTLY Prescriptions Printed Refills  
 diazePAM (VALIUM) 5 mg tablet 0 Sig: TAKE ONE TABLET BY MOUTH EVERY 8 HOURS AS needed max daily AMOUNT 15mg Class: Print We Performed the Following LIPID PANEL [82122 CPT(R)] METABOLIC PANEL, COMPREHENSIVE [68057 CPT(R)] PSA, DIAGNOSTIC (PROSTATE SPECIFIC AG) [id number] CPT(R)] Patient Instructions Benign Prostatic Hyperplasia: Care Instructions Your Care Instructions Benign prostatic hyperplasia, or BPH, is an enlarged prostate gland. The prostate is a small gland that makes some of the fluid in semen. Prostate enlargement happens to almost all men as they age. It is usually not serious. BPH does not cause prostate cancer. As the prostate gets bigger, it may partly block the flow of urine. You may have a hard time getting a urine stream started or completely stopped. BPH can cause dribbling. You may have a weak urine stream, or you may have to urinate more often than you used to, especially at night. Most men find these problems easy to manage. You do not need treatment unless your symptoms bother you a lot or you have other problems, such as bladder infections or stones. In these cases, medicines may help. Surgery is not needed unless the urine flow is blocked or the symptoms do not get better with medicine. Follow-up care is a key part of your treatment and safety. Be sure to make and go to all appointments, and call your doctor if you are having problems. It's also a good idea to know your test results and keep a list of the medicines you take. How can you care for yourself at home? · Take plenty of time to urinate. Try to relax. · Try \"double voiding. \" Urinate as much you can, relax for a few moments, and then try to urinate again. · Sit on the toilet to urinate. · Read or think of other things while you are waiting. · Turn on a faucet, or try to picture running water. Some men find that this helps get their urine flowing. · If dribbling is a problem, wash your penis daily to avoid skin irritation and infection. · Avoid caffeine and alcohol. These drinks will increase how often you need to urinate. Spread your fluid intake throughout the day. If the urge to urinate often wakes you at night, limit your fluid intake in the evening. Urinate right before you go to bed. · Many over-the-counter cold and allergy medicines can make the symptoms of BPH worse. Avoid antihistamines, decongestants, and allergy pills, if you can. Read the warnings on the package.  
· If you take any prescription medicines, especially tranquilizers or antidepressants, ask your doctor or pharmacist whether they can cause urination problems. There may be other medicines you can use that do not cause urinary problems. · Be safe with medicines. Take your medicines exactly as prescribed. Call your doctor if you think you are having a problem with your medicine. When should you call for help? Call your doctor now or seek immediate medical care if: 
  · You cannot urinate at all.  
  · You have symptoms of a urinary infection. For example: ¨ You have blood or pus in your urine. ¨ You have pain in your back just below your rib cage. This is called flank pain. ¨ You have a fever, chills, or body aches. ¨ It hurts to urinate. ¨ You have groin or belly pain.  
 Watch closely for changes in your health, and be sure to contact your doctor if: 
  · It hurts when you ejaculate.  
  · Your urinary problems get a lot worse or bother you a lot. Where can you learn more? Go to http://catracho-bryce.info/. Enter J800 in the search box to learn more about \"Benign Prostatic Hyperplasia: Care Instructions. \" Current as of: December 3, 2017 Content Version: 11.7 © 2366-7005 "[employer]. Care instructions adapted under license by [employer] (which disclaims liability or warranty for this information). If you have questions about a medical condition or this instruction, always ask your healthcare professional. [address] any warranty or liability for your use of this information. Introducing [facility]! [employer] introduces [employer] patient portal. Now you can access parts of your medical record, email your doctor's office, and request medication refills online. 1. In your internet browser, go to [email or web address] 2. Click on the First Time User? Click Here link in the Sign In box. You will see the New Member Sign Up page. 3. Enter your [employer] Access Code exactly as it appears below.  You will not need to use this code after youve completed the sign-up process. If you do not sign up before the expiration date, you must request a new code. · [employer] Access Code: [email or web address] Expires: 10/23/2018  8:04 AM 
 
4. Enter the last four digits of your Social Security Number (xxxx) and Date of Birth (mm/dd/yyyy) as indicated and click Submit. You will be taken to the next sign-up page. 5. Create a [employer] ID. This will be your [employer] login ID and cannot be changed, so think of one that is secure and easy to remember. 6. Create a [employer] password. You can change your password at any time. 7. Enter your Password Reset Question and Answer. This can be used at a later time if you forget your password. 8. Enter your e-mail address. You will receive e-mail notification when new information is available in [address] 9. Click Sign Up. You can now view and download portions of your medical record. 10. Click the Download Summary menu link to download a portable copy of your medical information. If you have questions, please visit the Frequently Asked Questions section of the [employer] website. Remember, [employer] is NOT to be used for urgent needs. For medical emergencies, dial 911. Now available from your iPhone and Android! Please provide this summary of care documentation to your next provider. Your primary care clinician is listed as Elba Cantu. If you have any questions after today's visit, please call [phone].

## 2018-07-25 NOTE — PROGRESS NOTES
Health Maintenance Due   Topic Date Due    DTaP/Tdap/Td series (1 - Tdap) 12/15/1974    ZOSTER VACCINE AGE 60>  10/15/2013    FOBT Q 1 YEAR AGE 50-75  11/17/2017     Body mass index is 23.06 kg/(m^2). 1. Have you been to the ER, urgent care clinic since your last visit? Hospitalized since your last visit? No    2. Have you seen or consulted any other health care providers outside of the [address] [place] [address] since your last visit? Include any pap smears or colon screening.  No  Reviewed record in preparation for visit and have necessary documentation  Pt did not bring medication to office visit for review  Information was given to pt on Advanced Directives, Living Will  Information was given on Shingles Vaccine  opportunity was given for questions  Goals that were addressed and/or need to be completed during or after this appointment include

## 2018-07-26 LAB
ALBUMIN SERPL-MCNC: 4.8 G/DL (ref 3.6–4.8)
ALBUMIN/GLOB SERPL: 1.8 {RATIO} (ref 1.2–2.2)
ALP SERPL-CCNC: 106 IU/L (ref 39–117)
ALT SERPL-CCNC: 14 IU/L (ref 0–44)
AST SERPL-CCNC: 18 IU/L (ref 0–40)
BILIRUB SERPL-MCNC: 0.4 MG/DL (ref 0–1.2)
BUN SERPL-MCNC: 12 MG/DL (ref 8–27)
BUN/CREAT SERPL: 13 (ref 10–24)
CALCIUM SERPL-MCNC: 10.3 MG/DL (ref 8.6–10.2)
CHLORIDE SERPL-SCNC: 105 MMOL/L (ref 96–106)
CHOLEST SERPL-MCNC: 197 MG/DL (ref 100–199)
CO2 SERPL-SCNC: 22 MMOL/L (ref 20–29)
CREAT SERPL-MCNC: 0.95 MG/DL (ref 0.76–1.27)
GLOBULIN SER CALC-MCNC: 2.6 G/DL (ref 1.5–4.5)
GLUCOSE SERPL-MCNC: 90 MG/DL (ref 65–99)
HDLC SERPL-MCNC: 38 MG/DL
LDLC SERPL CALC-MCNC: 142 MG/DL (ref 0–99)
POTASSIUM SERPL-SCNC: 5.6 MMOL/L (ref 3.5–5.2)
PROT SERPL-MCNC: 7.4 G/DL (ref 6–8.5)
PSA SERPL-MCNC: 3.9 NG/ML (ref 0–4)
SODIUM SERPL-SCNC: 142 MMOL/L (ref 134–144)
TRIGL SERPL-MCNC: 87 MG/DL (ref 0–149)
VLDLC SERPL CALC-MCNC: 17 MG/DL (ref 5–40)

## 2018-07-27 NOTE — PROGRESS NOTES
Chief Complaint   Patient presents with    Cholesterol Problem    Labs    Documentation     he is a 57y.o. year old male who presents for follow up of chronic health conditions. Patient with hx of insomnia, depression, HLD and LBP. He requests medication refills. He is fasting for lab work. Patient denies HA, dizziness, SOB, CP, abdominal pain, dysuria, myalgias or arthralgias. BP Readings from Last 3 Encounters:   07/25/18 (!) 142/94   05/24/18 137/82   03/02/18 137/78     Wt Readings from Last 3 Encounters:   07/25/18 170 lb (77.1 kg)   05/24/18 171 lb (77.6 kg)   03/02/18 177 lb (80.3 kg)     Body mass index is 23.06 kg/(m^2). Hyperlipidemia    Cardiovascular risks for him are: LDL goal is under 100  hyperlipidemia  smoker. Currently he takes Pravachol (pravastatin)   Lab Results   Component Value Date/Time    Cholesterol, total 197 07/25/2018 09:42 AM    HDL Cholesterol 38 (L) 07/25/2018 09:42 AM    LDL, calculated 142 (H) 07/25/2018 09:42 AM    Triglyceride 87 07/25/2018 09:42 AM     Lab Results   Component Value Date/Time    ALT (SGPT) 14 07/25/2018 09:42 AM    AST (SGOT) 18 07/25/2018 09:42 AM    Alk.  phosphatase 106 07/25/2018 09:42 AM    Bilirubin, total 0.4 07/25/2018 09:42 AM     Our goal is to lower hyperlipidemia to decrease the risks of strokes and heart attacks      Patient Active Problem List   Diagnosis Code    Hyperlipemia E78.5    Insomnia G47.00    History of left hip replacement Z96.642    Depression F32.9    Tobacco abuse Z72.0    Conductive hearing loss, bilateral H90.0     Past Surgical History:   Procedure Laterality Date    COLONOSCOPY N/A 8/23/2016    COLONOSCOPY performed by Magi Zheng MD at [address] HX COLONOSCOPY      HX GI      surgery for hemorrhoids    HX HEENT      all teeth removed    HX ORTHOPAEDIC      left hip replacement    HX ORTHOPAEDIC      infection after sutured finger/then healed/then had a growth removed 2 1/2 yrs later - benign    HX OTHER SURGICAL      growth on left side of testicle removed - benign     Social History     Social History    Marital status: SINGLE     Spouse name: N/A    Number of children: N/A    Years of education: N/A     Occupational History    Not on file. Social History Main Topics    Smoking status: Current Every Day Smoker     Packs/day: 0.50     Years: 45.00    Smokeless tobacco: Never Used    Alcohol use No    Drug use: No    Sexual activity: No     Other Topics Concern    Not on file     Social History Narrative     Family History   Problem Relation Age of Onset    Cancer Mother      breast    Stroke Mother      x2    Cancer Father      throat/lung    Heart Disease Father     Lung Disease Father     Thyroid Disease Father     Heart Attack Father     Suicide Brother     Psychiatric Disorder Brother     Heart Attack Paternal Uncle     Heart Attack Paternal Uncle      x3     Current Outpatient Prescriptions   Medication Sig    diazePAM (VALIUM) 5 mg tablet TAKE ONE TABLET BY MOUTH EVERY 8 HOURS AS needed max daily AMOUNT 15mg    traZODone (DESYREL) 150 mg tablet TAKE ONE TABLET BY MOUTH NIGHTLY    pravastatin (PRAVACHOL) 40 mg tablet TAKE ONE TABLET BY MOUTH EVERY NIGHT    montelukast (SINGULAIR) 10 mg tablet TAKE ONE TABLET BY MOUTH EVERY DAY FOR 90 DAYS    tamsulosin (FLOMAX) 0.4 mg capsule TAKE ONE CAPSULE BY MOUTH EVERY DAY    tiZANidine (ZANAFLEX) 4 mg tablet Take 1 Tab by mouth two (2) times a day for 90 days. Indications: Muscle Spasm    albuterol (PROVENTIL HFA, VENTOLIN HFA, PROAIR HFA) 90 mcg/actuation inhaler Ventolin HFA. Use 2 puffs every 6 hours as needed for wheezing.  omeprazole (PRILOSEC) 20 mg capsule      No current facility-administered medications for this visit.       Allergies   Allergen Reactions    Percocet [Oxycodone-Acetaminophen] Rash and Itching     itching       Review of Systems:  Constitutional: Negative for fatigue, malaise  Resp: Negative for cough, wheezing or SOB  CV: Negative for chest pain, dizziness or palpitations  MS: hx of LBP  Neuro: Negative for HA, weakness or paresthesia  Psych: History of depression, insomnia     Vitals:    07/25/18 0810   BP: (!) 142/94   Pulse: 72   Resp: 20   Temp: 98.2 °F (36.8 °C)   TempSrc: Oral   SpO2: 96%   Weight: 170 lb (77.1 kg)   Height: 6' (1.829 m)       Physical Examination:  General: thin, NAD  Head: Normocephalic, atraumatic  Eyes: Sclera clear, EOMI  Neck: Normal range of motion  Respiratory: symmetrical, unlabored effort  Cardiovascular: Regular rate and rhythm  Extremities:  antalgic gait,  Neurologic: No focal deficits  Psych: affect appropriate    Diagnoses and all orders for this visit:    1. Hyperlipidemia, unspecified hyperlipidemia type  -     LIPID PANEL  -     METABOLIC PANEL, COMPREHENSIVE    2. Primary insomnia    3. Conductive hearing loss, bilateral    4. Elevated PSA  -     PROSTATE SPECIFIC AG    5. Spinal stenosis of lumbar region, unspecified whether neurogenic claudication present  -     diazePAM (VALIUM) 5 mg tablet; TAKE ONE TABLET BY MOUTH EVERY 8 HOURS AS needed max daily AMOUNT 15mg    6. DDD (degenerative disc disease), lumbar  -     diazePAM (VALIUM) 5 mg tablet; TAKE ONE TABLET BY MOUTH EVERY 8 HOURS AS needed max daily AMOUNT 15mg      Advised patient to stop all tobacco use. I have discussed the diagnosis with the patient and the intended plan as seen in the above orders. The patient expresses understanding and agreement with our plan of care. The patient has received an after-visit summary. The patient knows to call our office if there are any questions or concerns regarding diagnosis and treatment plans. I have discussed medication side effects and warnings with the patient as well. Follow-up Disposition:  Return in about 6 months (around 1/25/2019), or if symptoms worsen or fail to improve.

## 2018-07-31 RX ORDER — ALBUTEROL SULFATE 90 UG/1
AEROSOL, METERED RESPIRATORY (INHALATION)
Qty: 1 INHALER | Refills: 1 | Status: SHIPPED | OUTPATIENT
Start: 2018-07-31 | End: 2018-08-31 | Stop reason: SDUPTHER

## 2018-08-20 ENCOUNTER — OFFICE VISIT (OUTPATIENT)
Dept: FAMILY MEDICINE CLINIC | Age: 63
End: 2018-08-20

## 2018-08-20 VITALS
RESPIRATION RATE: 20 BRPM | HEART RATE: 64 BPM | OXYGEN SATURATION: 98 % | SYSTOLIC BLOOD PRESSURE: 154 MMHG | DIASTOLIC BLOOD PRESSURE: 101 MMHG | BODY MASS INDEX: 23.22 KG/M2 | TEMPERATURE: 98.1 F | WEIGHT: 171.4 LBS | HEIGHT: 72 IN

## 2018-08-20 DIAGNOSIS — M94.0 COSTOCHONDRITIS, ACUTE: Primary | ICD-10-CM

## 2018-08-20 DIAGNOSIS — M48.061 SPINAL STENOSIS OF LUMBAR REGION, UNSPECIFIED WHETHER NEUROGENIC CLAUDICATION PRESENT: ICD-10-CM

## 2018-08-20 DIAGNOSIS — F41.9 ANXIETY: ICD-10-CM

## 2018-08-20 DIAGNOSIS — M51.36 DDD (DEGENERATIVE DISC DISEASE), LUMBAR: ICD-10-CM

## 2018-08-20 DIAGNOSIS — Z72.0 TOBACCO ABUSE: ICD-10-CM

## 2018-08-20 RX ORDER — PREDNISONE 20 MG/1
60 TABLET ORAL DAILY
Qty: 15 TAB | Refills: 0 | Status: SHIPPED | OUTPATIENT
Start: 2018-08-20 | End: 2019-01-25

## 2018-08-20 RX ORDER — DIAZEPAM 5 MG/1
TABLET ORAL
Qty: 30 TAB | Refills: 0 | Status: SHIPPED | OUTPATIENT
Start: 2018-08-20 | End: 2019-01-25 | Stop reason: SDUPTHER

## 2018-08-20 RX ORDER — DICLOFENAC SODIUM 75 MG/1
TABLET, DELAYED RELEASE ORAL
Qty: 60 TAB | Refills: 1 | Status: SHIPPED | OUTPATIENT
Start: 2018-08-20 | End: 2018-09-18 | Stop reason: SDUPTHER

## 2018-08-20 NOTE — MR AVS SNAPSHOT
[address] [facility] 
 
 
 [address] [place] [address] [address] [address] 
[phone] Patient: Kaitlin Ahumada MRN: [id number] [id number] Visit Information Date & Time Provider Department Dept. Phone Encounter #  
 8/20/2018  1:50 PM Talat Santana MD [address] [place] [address] [phone] [id number] Your Appointments 8/20/2018  1:50 PM  
ROUTINE CARE with Talat Santana MD  
[address] [place] [address] [facility] f[facility] [place] Skeletal Bruising;has alot of rib pain 2007 [address] [facility] [address] [facility]  
[phone]  
  
   
 [address] [place] [address] [address] [address]  
  
    
 1/25/2019  8:20 AM  
ROUTINE CARE with Talat Santana MD  
[address] [place] [address] [facility]: 6 month f/u  
 [address] [address]  
[phone] Upcoming Health Maintenance Date Due DTaP/Tdap/Td series (1 - Tdap) 12/15/1974 ZOSTER VACCINE AGE 60> 10/15/2013 FOBT Q 1 YEAR AGE 50-75 11/17/2017 Influenza Age 5 to Adult 8/1/2018 Pneumococcal 19-64 Medium Risk (1 of 1 - PPSV23) 9/2/2018* MEDICARE YEARLY EXAM 3/3/2019 *Topic was postponed. The date shown is not the original due date. Allergies as of 8/20/2018  Review Complete On: 8/20/2018 By: Angeles Pittman Severity Noted Reaction Type Reactions Percocet [Oxycodone-acetaminophen]  01/28/2015    Rash, Itching  
 itching Current Immunizations  Never Reviewed No immunizations on file. Not reviewed this visit You Were Diagnosed With   
  
 Codes Comments Costochondritis, acute    -  Primary ICD-10-CM: M94.0 ICD-9-CM: 733.6  Spinal stenosis of lumbar region, unspecified whether neurogenic claudication present     ICD-10-CM: M48.061 
ICD-9-CM: 724.02   
 DDD (degenerative disc disease), lumbar     ICD-10-CM: M51.36 
 ICD-9-CM: 722.52 Vitals BP Pulse Temp Resp Height(growth percentile) Weight(growth percentile) (!) 154/101 64 98.1 °F (36.7 °C) (Oral) 20 6' (1.829 m) 171 lb 6.4 oz (77.7 kg) SpO2 BMI Smoking Status 98% 23.25 kg/m2 Current Every Day Smoker Vitals History BMI and BSA Data Body Mass Index Body Surface Area  
 23.25 kg/m 2 1.99 m 2 Preferred Pharmacy Pharmacy Name Phone [facility] [address] [phone] Your Updated Medication List  
  
   
This list is accurate as of 8/20/18  1:35 PM.  Always use your most recent med list.  
  
  
  
  
 albuterol 90 mcg/actuation inhaler Commonly known as:  PROVENTIL HFA, VENTOLIN HFA, PROAIR HFA Ventolin HFA. Use 2 puffs every 6 hours as needed for wheezing. diazePAM 5 mg tablet Commonly known as:  VALIUM  
TAKE ONE TABLET BY MOUTH EVERY 8 HOURS AS needed max daily AMOUNT 15mg  
  
 diclofenac EC 75 mg EC tablet Commonly known as:  VOLTAREN  
TAKE ONE TABLET BY MOUTH TWICE DAILY  
  
 montelukast 10 mg tablet Commonly known as:  SINGULAIR  
TAKE ONE TABLET BY MOUTH EVERY DAY FOR 90 DAYS  
  
 omeprazole 20 mg capsule Commonly known as:  PRILOSEC  
  
 pravastatin 40 mg tablet Commonly known as:  PRAVACHOL  
TAKE ONE TABLET BY MOUTH EVERY NIGHT  
  
 predniSONE 20 mg tablet Commonly known as:  Martha Mons Take 3 Tabs by mouth daily. tamsulosin 0.4 mg capsule Commonly known as:  FLOMAX TAKE ONE CAPSULE BY MOUTH EVERY DAY  
  
 traZODone 150 mg tablet Commonly known as:  DESYREL  
TAKE ONE TABLET BY MOUTH NIGHTLY Prescriptions Printed Refills  
 diazePAM (VALIUM) 5 mg tablet 0 Sig: TAKE ONE TABLET BY MOUTH EVERY 8 HOURS AS needed max daily AMOUNT 15mg Class: Print Prescriptions Sent to Pharmacy Refills  
 predniSONE (DELTASONE) 20 mg tablet 0 Sig: Take 3 Tabs by mouth daily.   
 Class: Normal  
 Pharmacy: [facility], [address] [place] [address] Ph #: [phone] Route: Oral  
 diclofenac EC (VOLTAREN) 75 mg EC tablet 1 Sig: TAKE ONE TABLET BY MOUTH TWICE DAILY Class: Normal  
 Pharmacy: [facility], [address] Ph #: [phone] Introducing [facility] [place] [facility]! Дмитрий Duke introduces Favor patient portal. Now you can access parts of your medical record, email your doctor's office, and request medication refills online. 1. In your internet browser, go to [email or web address] 2. Click on the First Time User? Click Here link in the Sign In box. You will see the New Member Sign Up page. 3. Enter your Favor Access Code exactly as it appears below. You will not need to use this code after youve completed the sign-up process. If you do not sign up before the expiration date, you must request a new code. · Favor Access Code: [email or web address] Expires: 10/23/2018  8:04 AM 
 
4. Enter the last four digits of your Social Security Number (xxxx) and Date of Birth (mm/dd/yyyy) as indicated and click Submit. You will be taken to the next sign-up page. 5. Create a Favor ID. This will be your Favor login ID and cannot be changed, so think of one that is secure and easy to remember. 6. Create a Favor password. You can change your password at any time. 7. Enter your Password Reset Question and Answer. This can be used at a later time if you forget your password. 8. Enter your e-mail address. You will receive e-mail notification when new information is available in [address] 9. Click Sign Up. You can now view and download portions of your medical record. 10. Click the Download Summary menu link to download a portable copy of your medical information.  
 
If you have questions, please visit the Frequently Asked Questions section of the [employer]. Remember, SiBEAMhart is NOT to be used for urgent needs. For medical emergencies, dial 911. Now available from your iPhone and Android! Please provide this summary of care documentation to your next provider. Your primary care clinician is listed as Renetta Bhatia. If you have any questions after today's visit, please call [phone].

## 2018-08-20 NOTE — PROGRESS NOTES
Health Maintenance Due   Topic Date Due    DTaP/Tdap/Td series (1 - Tdap) 12/15/1974    ZOSTER VACCINE AGE 60>  10/15/2013    FOBT Q 1 YEAR AGE 50-75  11/17/2017    Influenza Age 9 to Adult  08/01/2018     Body mass index is 23.25 kg/(m^2). 1. Have you been to the ER, urgent care clinic since your last visit? Hospitalized since your last visit? No    2. Have you seen or consulted any other health care providers outside of the [address] [place] [address] since your last visit? Include any pap smears or colon screening.  No  Reviewed record in preparation for visit and have necessary documentation  Pt did not bring medication to office visit for review  Information was given to pt on Advanced Directives, Living Will  Information was given on Shingles Vaccine  opportunity was given for questions  Goals that were addressed and/or need to be completed during or after this appointment include

## 2018-08-27 NOTE — PATIENT INSTRUCTIONS
Costochondritis: Care Instructions  Your Care Instructions  You have chest pain because the cartilage of your rib cage is inflamed. This problem is called costochondritis. This type of chest wall pain may last from days to weeks. It is not a heart problem. Sometimes costochondritis occurs with a cold or the flu, and other times the exact cause is not known. Follow-up care is a key part of your treatment and safety. Be sure to make and go to all appointments, and call your doctor if you are having problems. It's also a good idea to know your test results and keep a list of the medicines you take. How can you care for yourself at home? · Take medicines for pain and inflammation exactly as directed. ¨ If the doctor gave you a prescription medicine, take it as prescribed. ¨ If you are not taking a prescription pain medicine, ask your doctor if you can take an over-the-counter medicine. ¨ Do not take two or more pain medicines at the same time unless the doctor told you to. Many pain medicines have acetaminophen, which is Tylenol. Too much acetaminophen (Tylenol) can be harmful. · It may help to use a warm compress or heating pad (set on low) on your chest. You can also try alternating heat and ice. Put ice or a cold pack on the area for 10 to 20 minutes at a time. Put a thin cloth between the ice and your skin. · Avoid any activity that strains the chest area. As your pain gets better, you can slowly return to your normal activities. · Do not use tape, an elastic bandage, a \"rib belt,\" or anything else that restricts your chest wall motion. When should you call for help? Call 911 anytime you think you may need emergency care. For example, call if:    · You have new or different chest pain or pressure. This may occur with:  ¨ Sweating. ¨ Shortness of breath. ¨ Nausea or vomiting. ¨ Pain that spreads from the chest to the neck, jaw, or one or both shoulders or arms. ¨ Dizziness or lightheadedness.   ¨ A fast or uneven pulse. After calling 911, chew 1 adult-strength aspirin. Wait for an ambulance. Do not try to drive yourself.     · You have severe trouble breathing.    Call your doctor now or seek immediate medical care if:    · You have a fever or cough.     · You have any trouble breathing.     · Your chest pain gets worse.    Watch closely for changes in your health, and be sure to contact your doctor if:    · Your chest pain continues even though you are taking anti-inflammatory medicine.     · Your chest wall pain has not improved after 5 to 7 days. Where can you learn more? Go to http://catracho-bryce.info/. Enter W746 in the search box to learn more about \"Costochondritis: Care Instructions. \"  Current as of: November 20, 2017  Content Version: 11.7  © 4852-6386 Healthwise, Incorporated. Care instructions adapted under license by [employer] (which disclaims liability or warranty for this information). If you have questions about a medical condition or this instruction, always ask your healthcare professional. [address] any warranty or liability for your use of this information.

## 2018-08-27 NOTE — PROGRESS NOTES
Chief Complaint   Patient presents with    Rib Pain    ED Follow-up     he is a 57y.o. year old male who presents with complaint of left lower rib pain. He has been seen in ED for same. He has hx of left total hip replacement and chronic LBP. Patient has previously been seen by ortho, neurology and pain management. He asks for refills of pain medications. Patient denies HA, dizziness, SOB, CP, abdominal pain, dysuria, weakness or paresthesia. Abby Da Silva He continues to smoke. BP Readings from Last 3 Encounters:   08/20/18 (!) 154/101   07/25/18 (!) 142/94   05/24/18 137/82     Wt Readings from Last 3 Encounters:   08/20/18 171 lb 6.4 oz (77.7 kg)   07/25/18 170 lb (77.1 kg)   05/24/18 171 lb (77.6 kg)     Body mass index is 23.25 kg/(m^2). Patient Active Problem List   Diagnosis Code    Hyperlipemia E78.5    Insomnia G47.00    History of left hip replacement Z96.642    Depression F32.9    Tobacco abuse Z72.0    Conductive hearing loss, bilateral H90.0     Past Surgical History:   Procedure Laterality Date    COLONOSCOPY N/A 8/23/2016    COLONOSCOPY performed by Ruthann Lay MD at [address] HX COLONOSCOPY      HX GI      surgery for hemorrhoids    HX HEENT      all teeth removed    HX ORTHOPAEDIC      left hip replacement    HX ORTHOPAEDIC      infection after sutured finger/then healed/then had a growth removed 2 1/2 yrs later - benign    HX OTHER SURGICAL      growth on left side of testicle removed - benign     Social History     Social History    Marital status: SINGLE     Spouse name: N/A    Number of children: N/A    Years of education: N/A     Occupational History    Not on file.      Social History Main Topics    Smoking status: Current Every Day Smoker     Packs/day: 0.50     Years: 45.00    Smokeless tobacco: Never Used    Alcohol use No    Drug use: No    Sexual activity: No     Other Topics Concern    Not on file     Social History Narrative     Family History   Problem Relation Age of Onset    Cancer Mother      breast    Stroke Mother      x2    Cancer Father      throat/lung    Heart Disease Father     Lung Disease Father     Thyroid Disease Father     Heart Attack Father     Suicide Brother     Psychiatric Disorder Brother     Heart Attack Paternal Uncle     Heart Attack Paternal Uncle      x3     Current Outpatient Prescriptions   Medication Sig    predniSONE (DELTASONE) 20 mg tablet Take 3 Tabs by mouth daily.  diclofenac EC (VOLTAREN) 75 mg EC tablet TAKE ONE TABLET BY MOUTH TWICE DAILY    diazePAM (VALIUM) 5 mg tablet TAKE ONE TABLET BY MOUTH EVERY 8 HOURS AS needed max daily AMOUNT 15mg    tamsulosin (FLOMAX) 0.4 mg capsule TAKE ONE CAPSULE BY MOUTH EVERY DAY    albuterol (PROVENTIL HFA, VENTOLIN HFA, PROAIR HFA) 90 mcg/actuation inhaler Ventolin HFA. Use 2 puffs every 6 hours as needed for wheezing.  traZODone (DESYREL) 150 mg tablet TAKE ONE TABLET BY MOUTH NIGHTLY    pravastatin (PRAVACHOL) 40 mg tablet TAKE ONE TABLET BY MOUTH EVERY NIGHT    montelukast (SINGULAIR) 10 mg tablet TAKE ONE TABLET BY MOUTH EVERY DAY FOR 90 DAYS    omeprazole (PRILOSEC) 20 mg capsule      No current facility-administered medications for this visit.       Allergies   Allergen Reactions    Percocet [Oxycodone-Acetaminophen] Rash and Itching     itching       Review of Systems:  Constitutional: Negative for fatigue, malaise  Resp: Negative for cough, wheezing or SOB  CV: Negative for chest pain, dizziness or palpitations  MS: see HPI  Neuro: Negative for HA, weakness or paresthesia  Psych: History of depression, insomnia     Vitals:    08/20/18 1314   BP: (!) 154/101   Pulse: 64   Resp: 20   Temp: 98.1 °F (36.7 °C)   TempSrc: Oral   SpO2: 98%   Weight: 171 lb 6.4 oz (77.7 kg)   Height: 6' (1.829 m)       Physical Examination:  General: thin, appears uncomfortable  Head: Normocephalic, atraumatic  Eyes: Sclera clear, EOMI  Neck: Normal range of motion  Respiratory: symmetrical, unlabored effort  Cardiovascular: Regular rate and rhythm  Extremities: right lumbar paraspinal muscle TTP, right hip with FROM, straight leg raise positive, antalgic gait,  Neurologic: Normal strength and sensation  Psych: affect appropriate    Diagnoses and all orders for this visit:    1. Costochondritis, acute  -     predniSONE (DELTASONE) 20 mg tablet; Take 3 Tabs by mouth daily. -     diclofenac EC (VOLTAREN) 75 mg EC tablet; TAKE ONE TABLET BY MOUTH TWICE DAILY    2. Spinal stenosis of lumbar region, unspecified whether neurogenic claudication present  -     diazePAM (VALIUM) 5 mg tablet; TAKE ONE TABLET BY MOUTH EVERY 8 HOURS AS needed max daily AMOUNT 15mg    3. DDD (degenerative disc disease), lumbar  -     diazePAM (VALIUM) 5 mg tablet; TAKE ONE TABLET BY MOUTH EVERY 8 HOURS AS needed max daily AMOUNT 15mg    4. Anxiety    5. Tobacco abuse      Plan of care:  Diagnoses were discussed in detail with patient. Medication risks/benefits/side effects discussed with patient. Advised patient to stop all tobacco use. All of the patient's questions were addressed and answered to apparent satisfaction. The patient understands and agrees with our plan of care. The patient knows to call back if they have questions about the plan of care or if symptoms change. The patient received an After-Visit Summary which contains VS, diagnoses, orders, allergy and medication lists. Future Appointments  Date Time Provider Marzena Chopra   1/25/2019 8:20 AM Dave Medina MD [facility] SAVANNAH GARRIDO         Follow-up Disposition:  Return in about 5 months (around 1/20/2019), or if symptoms worsen or fail to improve.

## 2019-01-25 ENCOUNTER — OFFICE VISIT (OUTPATIENT)
Dept: FAMILY MEDICINE CLINIC | Age: 64
End: 2019-01-25

## 2019-01-25 VITALS
HEART RATE: 59 BPM | TEMPERATURE: 97.6 F | WEIGHT: 167 LBS | OXYGEN SATURATION: 95 % | RESPIRATION RATE: 20 BRPM | BODY MASS INDEX: 22.62 KG/M2 | HEIGHT: 72 IN | DIASTOLIC BLOOD PRESSURE: 79 MMHG | SYSTOLIC BLOOD PRESSURE: 141 MMHG

## 2019-01-25 DIAGNOSIS — F51.01 PRIMARY INSOMNIA: ICD-10-CM

## 2019-01-25 DIAGNOSIS — N40.1 BENIGN PROSTATIC HYPERPLASIA WITH NOCTURIA: ICD-10-CM

## 2019-01-25 DIAGNOSIS — M51.36 DDD (DEGENERATIVE DISC DISEASE), LUMBAR: ICD-10-CM

## 2019-01-25 DIAGNOSIS — R35.1 BENIGN PROSTATIC HYPERPLASIA WITH NOCTURIA: ICD-10-CM

## 2019-01-25 DIAGNOSIS — Z72.0 TOBACCO ABUSE: ICD-10-CM

## 2019-01-25 DIAGNOSIS — E78.5 HYPERLIPIDEMIA, UNSPECIFIED HYPERLIPIDEMIA TYPE: Primary | ICD-10-CM

## 2019-01-25 RX ORDER — DIAZEPAM 5 MG/1
TABLET ORAL
Qty: 30 TAB | Refills: 0 | Status: SHIPPED | OUTPATIENT
Start: 2019-01-25 | End: 2019-03-04 | Stop reason: SDUPTHER

## 2019-01-25 NOTE — PROGRESS NOTES
Chief Complaint Patient presents with  Follow-up 6 month check up  
 
he is a 70y.o. year old male who presents for follow up of chronic health conditions. Patient with hx of insomnia, depression, HLD and LBP. He requests medication refills. He is fasting for lab work. Patient denies HA, dizziness, SOB, CP, abdominal pain, dysuria, myalgias or arthralgias. BP Readings from Last 3 Encounters:  
01/25/19 141/79  
08/20/18 (!) 154/101  
07/25/18 (!) 142/94 Wt Readings from Last 3 Encounters:  
01/25/19 167 lb (75.8 kg) 08/20/18 171 lb 6.4 oz (77.7 kg) 07/25/18 170 lb (77.1 kg) Body mass index is 22.65 kg/m². Hyperlipidemia Cardiovascular risks for him are: LDL goal is under 100, hyperlipidemia, smoker. Currently he takes Pravachol (pravastatin) Our goal is to lower hyperlipidemia to decrease the risks of strokes and heart attacks Patient Active Problem List  
Diagnosis Code  Hyperlipemia E78.5  Insomnia G47.00  
 History of left hip replacement N21.680  Depression F32.9  Tobacco abuse Z72.0  
 Conductive hearing loss, bilateral H90.0 Past Surgical History:  
Procedure Laterality Date  COLONOSCOPY N/A 8/23/2016 COLONOSCOPY performed by Alexa Gamble MD at [address] [place] [address] HX COLONOSCOPY    
 HX GI    
 surgery for hemorrhoids  HX HEENT    
 all teeth removed  HX ORTHOPAEDIC    
 left hip replacement  HX ORTHOPAEDIC    
 infection after sutured finger/then healed/then had a growth removed 2 1/2 yrs later - benign  HX OTHER SURGICAL    
 growth on left side of testicle removed - benign Social History Socioeconomic History  Marital status: SINGLE Spouse name: Not on file  Number of children: Not on file  Years of education: Not on file  Highest education level: Not on file Social Needs  Financial resource strain: Not on file  Food insecurity - worry: Not on file  Food insecurity - inability: Not on file  Transportation needs - medical: Not on file  Transportation needs - non-medical: Not on file Occupational History  Not on file Tobacco Use  Smoking status: Current Every Day Smoker Packs/day: 0.50 Years: 45.00 Pack years: 22.50  Smokeless tobacco: Never Used Substance and Sexual Activity  Alcohol use: No  
 Drug use: No  
 Sexual activity: No  
Other Topics Concern  Not on file Social History Narrative  Not on file Family History Problem Relation Age of Onset  Cancer Mother   
     breast  
 Stroke Mother x2  Cancer Father   
     throat/lung  Heart Disease Father  Lung Disease Father  Thyroid Disease Father  Heart Attack Father  Suicide Brother  Psychiatric Disorder Brother  Heart Attack Paternal Uncle  Heart Attack Paternal Uncle x3 Current Outpatient Medications Medication Sig  
 diazePAM (VALIUM) 5 mg tablet TAKE ONE TABLET BY MOUTH EVERY 8 HOURS AS needed max daily AMOUNT 15mg  diclofenac EC (VOLTAREN) 75 mg EC tablet TAKE ONE TABLET BY MOUTH TWICE DAILY  pravastatin (PRAVACHOL) 40 mg tablet TAKE ONE TABLET BY MOUTH EVERY NIGHT  montelukast (SINGULAIR) 10 mg tablet TAKE ONE TABLET BY MOUTH EVERY DAY  VENTOLIN HFA 90 mcg/actuation inhaler inhale 2 PUFFS EVERY 6 HOURS AS NEEDED FOR WHEEZING  
 traZODone (DESYREL) 150 mg tablet TAKE ONE TABLET BY MOUTH nightly  tiZANidine (ZANAFLEX) 4 mg tablet TAKE ONE TABLET BY MOUTH TWICE DAILY  tamsulosin (FLOMAX) 0.4 mg capsule TAKE ONE CAPSULE BY MOUTH EVERY DAY  omeprazole (PRILOSEC) 20 mg capsule No current facility-administered medications for this visit. Allergies Allergen Reactions  Percocet [Oxycodone-Acetaminophen] Rash and Itching  
  itching Review of Systems: 
Constitutional: Negative for fatigue, malaise Derm: Negative for rash or lesion HEENT: Negative for acute hearing changes Resp: Negative for cough, wheezing or SOB 
CV: Negative for chest pain, dizziness or palpitations Gastrointestinal: Negative for nausea or abdominal pain Genital/urinary: Negative for dysuria or voiding dysfunction MS: hx of LBP Neuro: Negative for HA, weakness or paresthesia Psych: History of depression, insomnia Vitals:  
 01/25/19 2277 BP: 141/79 Pulse: (!) 59 Resp: 20 Temp: 97.6 °F (36.4 °C) TempSrc: Oral  
SpO2: 95% Weight: 167 lb (75.8 kg) Height: 6' (1.829 m) Physical Examination: 
General: thin, appears uncomfortable Head: Normocephalic, atraumatic Eyes: Sclera clear, EOMI Neck: Normal range of motion Respiratory: CTAB with symmetrical, unlabored effort Cardiovascular: Normal S1, S2, Regular rate and rhythm, no carotid bruit Extremities: FROM, antalgic gait, Neurologic: No focal deficits Psych: affect appropriate Diagnoses and all orders for this visit: 
 
1. Hyperlipidemia, unspecified hyperlipidemia type -     LIPID PANEL 
-     METABOLIC PANEL, COMPREHENSIVE 
-     TSH 3RD GENERATION 
-     REFERRAL TO OPHTHALMOLOGY 2. Benign prostatic hyperplasia with nocturia -     PSA, DIAGNOSTIC (PROSTATE SPECIFIC AG) 3. Primary insomnia 4. Tobacco abuse 5. DDD (degenerative disc disease), lumbar 
-     diazePAM (VALIUM) 5 mg tablet; TAKE ONE TABLET BY MOUTH EVERY 8 HOURS AS needed max daily AMOUNT 15mg 6. Spinal stenosis of lumbar region, unspecified whether neurogenic claudication present 
-     diazePAM (VALIUM) 5 mg tablet; TAKE ONE TABLET BY MOUTH EVERY 8 HOURS AS needed max daily AMOUNT 15mg Advised patient to stop all tobacco use. I have discussed the diagnosis with the patient and the intended plan as seen in the above orders. The patient expresses understanding and agreement with our plan of care. The patient has received an after-visit summary.   
The patient knows to call our office if there are any questions or concerns regarding diagnosis and treatment plans. I have discussed medication side effects and warnings with the patient as well. Follow-up Disposition: 
Return in about 6 months (around 7/25/2019), or if symptoms worsen or fail to improve.

## 2019-01-25 NOTE — PROGRESS NOTES
1. Have you been to the ER, urgent care clinic since your last visit? Hospitalized since your last visit? No 
 
2. Have you seen or consulted any other health care providers outside of the [address] [place] [address] since your last visit? Include any pap smears or colon screening. No 
Reviewed record in preparation for visit and have necessary documentation Pt did not bring medication to office visit for review Goals that were addressed and/or need to be completed during or after this appointment include Health Maintenance Due Topic Date Due  
 DTaP/Tdap/Td series (1 - Tdap) 12/15/1974  Shingrix Vaccine Age 50> (1 of 2) 12/15/2003  FOBT Q 1 YEAR AGE 50-75  11/17/2017  Influenza Age 5 to Adult  08/01/2018  GLAUCOMA SCREENING Q2Y  12/15/2018  Pneumococcal 65+ Low/Medium Risk (1 of 2 - PCV13) 12/15/2018  AAA Screening 73-67 YO Male Smoking Patients  12/15/2018

## 2019-01-26 LAB
ALBUMIN SERPL-MCNC: 4.5 G/DL (ref 3.6–4.8)
ALBUMIN/GLOB SERPL: 2 {RATIO} (ref 1.2–2.2)
ALP SERPL-CCNC: 82 IU/L (ref 39–117)
ALT SERPL-CCNC: 21 IU/L (ref 0–44)
AST SERPL-CCNC: 22 IU/L (ref 0–40)
BILIRUB SERPL-MCNC: 0.5 MG/DL (ref 0–1.2)
BUN SERPL-MCNC: 14 MG/DL (ref 8–27)
BUN/CREAT SERPL: 14 (ref 10–24)
CALCIUM SERPL-MCNC: 9.9 MG/DL (ref 8.6–10.2)
CHLORIDE SERPL-SCNC: 108 MMOL/L (ref 96–106)
CHOLEST SERPL-MCNC: 184 MG/DL (ref 100–199)
CO2 SERPL-SCNC: 22 MMOL/L (ref 20–29)
CREAT SERPL-MCNC: 0.97 MG/DL (ref 0.76–1.27)
GLOBULIN SER CALC-MCNC: 2.2 G/DL (ref 1.5–4.5)
GLUCOSE SERPL-MCNC: 93 MG/DL (ref 65–99)
HDLC SERPL-MCNC: 38 MG/DL
LDLC SERPL CALC-MCNC: 132 MG/DL (ref 0–99)
POTASSIUM SERPL-SCNC: 5.7 MMOL/L (ref 3.5–5.2)
PROT SERPL-MCNC: 6.7 G/DL (ref 6–8.5)
PSA SERPL-MCNC: 3.1 NG/ML (ref 0–4)
SODIUM SERPL-SCNC: 144 MMOL/L (ref 134–144)
TRIGL SERPL-MCNC: 71 MG/DL (ref 0–149)
TSH SERPL DL<=0.005 MIU/L-ACNC: 1.5 UIU/ML (ref 0.45–4.5)
VLDLC SERPL CALC-MCNC: 14 MG/DL (ref 5–40)

## 2019-01-30 RX ORDER — TAMSULOSIN HYDROCHLORIDE 0.4 MG/1
CAPSULE ORAL
Qty: 90 CAP | Refills: 1 | Status: SHIPPED | OUTPATIENT
Start: 2019-01-30 | End: 2019-08-01 | Stop reason: SDUPTHER

## 2019-01-30 NOTE — PATIENT INSTRUCTIONS

## 2019-03-04 ENCOUNTER — OFFICE VISIT (OUTPATIENT)
Dept: FAMILY MEDICINE CLINIC | Age: 64
End: 2019-03-04

## 2019-03-04 VITALS
SYSTOLIC BLOOD PRESSURE: 138 MMHG | HEIGHT: 72 IN | OXYGEN SATURATION: 98 % | WEIGHT: 167 LBS | TEMPERATURE: 97.9 F | HEART RATE: 62 BPM | RESPIRATION RATE: 16 BRPM | DIASTOLIC BLOOD PRESSURE: 71 MMHG | BODY MASS INDEX: 22.62 KG/M2

## 2019-03-04 DIAGNOSIS — M51.36 DDD (DEGENERATIVE DISC DISEASE), LUMBAR: ICD-10-CM

## 2019-03-04 DIAGNOSIS — Z00.00 MEDICARE ANNUAL WELLNESS VISIT, SUBSEQUENT: Primary | ICD-10-CM

## 2019-03-04 DIAGNOSIS — Z13.31 SCREENING FOR DEPRESSION: ICD-10-CM

## 2019-03-04 DIAGNOSIS — F17.200 TOBACCO DEPENDENCE: ICD-10-CM

## 2019-03-04 DIAGNOSIS — Z13.39 SCREENING FOR ALCOHOLISM: ICD-10-CM

## 2019-03-04 DIAGNOSIS — F33.41 RECURRENT MAJOR DEPRESSIVE DISORDER, IN PARTIAL REMISSION (HCC): ICD-10-CM

## 2019-03-04 RX ORDER — DIAZEPAM 5 MG/1
TABLET ORAL
Qty: 30 TAB | Refills: 0 | Status: SHIPPED | OUTPATIENT
Start: 2019-03-04 | End: 2019-04-03 | Stop reason: SDUPTHER

## 2019-03-04 RX ORDER — BUPROPION HYDROCHLORIDE 150 MG/1
150 TABLET ORAL
Qty: 30 TAB | Refills: 3 | Status: SHIPPED | OUTPATIENT
Start: 2019-03-04 | End: 2019-05-04 | Stop reason: SDUPTHER

## 2019-03-04 NOTE — PATIENT INSTRUCTIONS
Medicare Wellness Visit, Male The best way to live healthy is to have a lifestyle where you eat a well-balanced diet, exercise regularly, limit alcohol use, and quit all forms of tobacco/nicotine, if applicable. Regular preventive services are another way to keep healthy. Preventive services (vaccines, screening tests, monitoring & exams) can help personalize your care plan, which helps you manage your own care. Screening tests can find health problems at the earliest stages, when they are easiest to treat. [facility] follows the current, evidence-based guidelines published by the [facility] ([facility]) when recommending preventive services for our patients. Because we follow these guidelines, sometimes recommendations change over time as research supports it. (For example, a prostate screening blood test is no longer routinely recommended for men with no symptoms.) Of course, you and your doctor may decide to screen more often for some diseases, based on your risk and co-morbidities (chronic disease you are already diagnosed with). Preventive services for you include: - Medicare offers their members a free annual wellness visit, which is time for you and your primary care provider to discuss and plan for your preventive service needs. Take advantage of this benefit every year! 
-All adults over age 72 should receive the recommended pneumonia vaccines. Current USPSTF guidelines recommend a series of two vaccines for the best pneumonia protection.  
-All adults should have a flu vaccine yearly and an ECG.  All adults age 61 and older should receive a shingles vaccine once in their lifetime.   
-All adults age 38-68 who are overweight should have a diabetes screening test once every three years.  
-Other screening tests & preventive services for persons with diabetes include: an eye exam to screen for diabetic retinopathy, a kidney function test, a foot exam, and stricter control over your cholesterol.  
-Cardiovascular screening for adults with routine risk involves an electrocardiogram (ECG) at intervals determined by the provider.  
-Colorectal cancer screening should be done for adults age 54-65 with no increased risk factors for colorectal cancer. There are a number of acceptable methods of screening for this type of cancer. Each test has its own benefits and drawbacks. Discuss with your provider what is most appropriate for you during your annual wellness visit. The different tests include: colonoscopy (considered the best screening method), a fecal occult blood test, a fecal DNA test, and sigmoidoscopy. 
-All adults born between [facility] should be screened once for Hepatitis C. 
-An Abdominal Aortic Aneurysm (AAA) Screening is recommended for men age 73-68 who has ever smoked in their lifetime. Here is a list of your current Health Maintenance items (your personalized list of preventive services) with a due date: There are no preventive care reminders to display for this patient.

## 2019-03-04 NOTE — PROGRESS NOTES
1. Have you been to the ER, urgent care clinic since your last visit? Hospitalized since your last visit? No 
 
2. Have you seen or consulted any other health care providers outside of the [address] [place] [address] since your last visit? Include any pap smears or colon screening. No 
Reviewed record in preparation for visit and have necessary documentation Pt did not bring medication to office visit for review Goals that were addressed and/or need to be completed during or after this appointment include Health Maintenance Due Topic Date Due  MEDICARE YEARLY EXAM  03/03/2019

## 2019-03-04 NOTE — PROGRESS NOTES
Chief Complaint Patient presents with  Annual Wellness Visit  
 
he is a 70y.o. year old male who presents for follow up of chronic health conditions. Patient with hx of insomnia, depression, HLD and LBP. He requests medication for his depression. He asks for refill of valium which he uses for LBP. Patient denies HA, dizziness, SOB, CP, abdominal pain, dysuria, acute myalgias or arthralgias. BP Readings from Last 3 Encounters:  
03/04/19 138/71  
01/25/19 141/79  
08/20/18 (!) 154/101 Wt Readings from Last 3 Encounters:  
03/04/19 167 lb (75.8 kg) 01/25/19 167 lb (75.8 kg) 08/20/18 171 lb 6.4 oz (77.7 kg) Body mass index is 22.65 kg/m². Hyperlipidemia Cardiovascular risks for him are: LDL goal is under 100 
hyperlipidemia 
smoker. Currently he takes Pravachol (pravastatin) Lab Results Component Value Date/Time Cholesterol, total 184 01/25/2019 10:00 AM  
 HDL Cholesterol 38 (L) 01/25/2019 10:00 AM  
 LDL, calculated 132 (H) 01/25/2019 10:00 AM  
 Triglyceride 71 01/25/2019 10:00 AM  
 
Lab Results Component Value Date/Time ALT (SGPT) 21 01/25/2019 10:00 AM  
 AST (SGOT) 22 01/25/2019 10:00 AM  
 Alk. phosphatase 82 01/25/2019 10:00 AM  
 Bilirubin, total 0.5 01/25/2019 10:00 AM  
 
Our goal is to lower hyperlipidemia to decrease the risks of strokes and heart attacks Patient Active Problem List  
Diagnosis Code  Hyperlipemia E78.5  Insomnia G47.00  
 History of left hip replacement K51.356  Depression F32.9  Tobacco abuse Z72.0  
 Conductive hearing loss, bilateral H90.0 Past Surgical History:  
Procedure Laterality Date  COLONOSCOPY N/A 8/23/2016 COLONOSCOPY performed by Wu Walton MD at [facility] HX COLONOSCOPY    
 HX GI    
 surgery for hemorrhoids  HX HEENT    
 all teeth removed  HX ORTHOPAEDIC    
 left hip replacement  HX ORTHOPAEDIC    
 infection after sutured finger/then healed/then had a growth removed 2 1/2 yrs later - benign  HX OTHER SURGICAL    
 growth on left side of testicle removed - benign Social History Socioeconomic History  Marital status: SINGLE Spouse name: Not on file  Number of children: Not on file  Years of education: Not on file  Highest education level: Not on file Social Needs  Financial resource strain: Not on file  Food insecurity - worry: Not on file  Food insecurity - inability: Not on file  Transportation needs - medical: Not on file  Transportation needs - non-medical: Not on file Occupational History  Not on file Tobacco Use  Smoking status: Current Every Day Smoker Packs/day: 0.50 Years: 45.00 Pack years: 22.50  Smokeless tobacco: Never Used Substance and Sexual Activity  Alcohol use: No  
 Drug use: No  
 Sexual activity: No  
Other Topics Concern  Not on file Social History Narrative  Not on file Family History Problem Relation Age of Onset  Cancer Mother   
     breast  
 Stroke Mother x2  Cancer Father   
     throat/lung  Heart Disease Father  Lung Disease Father  Thyroid Disease Father  Heart Attack Father  Suicide Brother  Psychiatric Disorder Brother  Heart Attack Paternal Uncle  Heart Attack Paternal Uncle x3 Current Outpatient Medications Medication Sig  
 diazePAM (VALIUM) 5 mg tablet TAKE ONE TABLET BY MOUTH EVERY 8 HOURS AS needed max daily AMOUNT 15mg  buPROPion XL (WELLBUTRIN XL) 150 mg tablet Take 1 Tab by mouth every morning.  tiZANidine (ZANAFLEX) 4 mg tablet TAKE ONE TABLET BY MOUTH TWICE DAILY  VENTOLIN HFA 90 mcg/actuation inhaler inhale 2 PUFFS by mouth EVERY 6 HOURS AS NEEDED FOR WHEEZING  tamsulosin (FLOMAX) 0.4 mg capsule TAKE ONE CAPSULE BY MOUTH EVERY DAY  diclofenac EC (VOLTAREN) 75 mg EC tablet TAKE ONE TABLET BY MOUTH TWICE DAILY  pravastatin (PRAVACHOL) 40 mg tablet TAKE ONE TABLET BY MOUTH EVERY NIGHT  montelukast (SINGULAIR) 10 mg tablet TAKE ONE TABLET BY MOUTH EVERY DAY  traZODone (DESYREL) 150 mg tablet TAKE ONE TABLET BY MOUTH nightly  omeprazole (PRILOSEC) 20 mg capsule No current facility-administered medications for this visit. Allergies Allergen Reactions  Percocet [Oxycodone-Acetaminophen] Rash and Itching  
  itching Review of Systems: 
Constitutional: Negative for fatigue, malaise Resp: Negative for cough, wheezing or SOB 
CV: Negative for chest pain, dizziness or palpitations MS: hx of LBP Neuro: Negative for HA, weakness or paresthesia Psych: History of depression, insomnia Vitals:  
 03/04/19 1306 BP: 138/71 Pulse: 62 Resp: 16 Temp: 97.9 °F (36.6 °C) TempSrc: Oral  
SpO2: 98% Weight: 167 lb (75.8 kg) Height: 6' (1.829 m) Physical Examination: 
General: thin, NAD Head: Normocephalic, atraumatic Eyes: Sclera clear, EOMI Neck: Normal range of motion Respiratory: symmetrical, unlabored effort Cardiovascular: Regular rate and rhythm Extremities:  antalgic gait Neurologic: No focal deficits Psych: affect appropriate Diagnoses and all orders for this visit: 1. Recurrent major depressive disorder, in partial remission ([address] -     buPROPion XL (WELLBUTRIN XL) 150 mg tablet; Take 1 Tab by mouth every morning. 2. DDD (degenerative disc disease), lumbar 
-     diazePAM (VALIUM) 5 mg tablet; TAKE ONE TABLET BY MOUTH EVERY 8 HOURS AS needed max daily AMOUNT 15mg 3. Tobacco dependence 
-     buPROPion XL (WELLBUTRIN XL) 150 mg tablet; Take 1 Tab by mouth every morning. Advised patient to stop all tobacco use. I have discussed the diagnosis with the patient and the intended plan as seen in the above orders. The patient expresses understanding and agreement with our plan of care. The patient has received an after-visit summary. The patient knows to call our office if there are any questions or concerns regarding diagnosis and treatment plans. I have discussed medication side effects and warnings with the patient as well. Follow-up Disposition: 
Return in about 6 months (around 9/4/2019), or if symptoms worsen or fail to improve. The following Annual Medicare Wellness Exam is distinct and separate from the medical evaluation and decision making. This is the Subsequent Medicare Annual Wellness Exam, performed 12 months or more after the Initial AWV or the last Subsequent AWV I have reviewed the patient's medical history in detail and updated the computerized patient record. History Past Medical History:  
Diagnosis Date  Adverse effect of anesthesia \"major fear of needles\"/raw feeling down throat  Arthritis   
 osteo  Chronic obstructive pulmonary disease ([address]  Chronic pain   
 lower back - stenosis - had injections/steroids  Hypercholesterolemia  Ill-defined condition   
 low BP but not a problem  Psychiatric disorder   
 depression Past Surgical History:  
Procedure Laterality Date  COLONOSCOPY N/A 8/23/2016 COLONOSCOPY performed by Amina Diallo MD at [address] HX COLONOSCOPY    
 HX GI    
 surgery for hemorrhoids  HX HEENT    
 all teeth removed  HX ORTHOPAEDIC    
 left hip replacement  HX ORTHOPAEDIC    
 infection after sutured finger/then healed/then had a growth removed 2 1/2 yrs later - benign  HX OTHER SURGICAL    
 growth on left side of testicle removed - benign Current Outpatient Medications Medication Sig Dispense Refill  diazePAM (VALIUM) 5 mg tablet TAKE ONE TABLET BY MOUTH EVERY 8 HOURS AS needed max daily AMOUNT 15mg 30 Tab 0  
 buPROPion XL (WELLBUTRIN XL) 150 mg tablet Take 1 Tab by mouth every morning.  30 Tab 3  
 tiZANidine (ZANAFLEX) 4 mg tablet TAKE ONE TABLET BY MOUTH TWICE DAILY 180 Tab 0  
  VENTOLIN HFA 90 mcg/actuation inhaler inhale 2 PUFFS by mouth EVERY 6 HOURS AS NEEDED FOR WHEEZING 1 Inhaler 2  
 tamsulosin (FLOMAX) 0.4 mg capsule TAKE ONE CAPSULE BY MOUTH EVERY DAY 90 Cap 1  diclofenac EC (VOLTAREN) 75 mg EC tablet TAKE ONE TABLET BY MOUTH TWICE DAILY 60 Tab 1  pravastatin (PRAVACHOL) 40 mg tablet TAKE ONE TABLET BY MOUTH EVERY NIGHT 30 Tab 2  
 montelukast (SINGULAIR) 10 mg tablet TAKE ONE TABLET BY MOUTH EVERY DAY 30 Tab 2  
 traZODone (DESYREL) 150 mg tablet TAKE ONE TABLET BY MOUTH nightly 30 Tab 2  
 omeprazole (PRILOSEC) 20 mg capsule Allergies Allergen Reactions  Percocet [Oxycodone-Acetaminophen] Rash and Itching  
  itching Family History Problem Relation Age of Onset  Cancer Mother   
     breast  
 Stroke Mother x2  Cancer Father   
     throat/lung  Heart Disease Father  Lung Disease Father  Thyroid Disease Father  Heart Attack Father  Suicide Brother  Psychiatric Disorder Brother  Heart Attack Paternal Uncle  Heart Attack Paternal Uncle x3 Social History Tobacco Use  Smoking status: Current Every Day Smoker Packs/day: 0.50 Years: 45.00 Pack years: 22.50  Smokeless tobacco: Never Used Substance Use Topics  Alcohol use: No  
 
Patient Active Problem List  
Diagnosis Code  Hyperlipemia E78.5  Insomnia G47.00  
 History of left hip replacement C76.432  Depression F32.9  Tobacco abuse Z72.0  
 Conductive hearing loss, bilateral H90.0 Depression Risk Factor Screening:  
 
3 most recent PHQ Screens 1/25/2019 PHQ Not Done - Little interest or pleasure in doing things Not at all Feeling down, depressed, irritable, or hopeless Nearly every day Total Score PHQ 2 3 Trouble falling or staying asleep, or sleeping too much Not at all Feeling tired or having little energy Nearly every day Poor appetite, weight loss, or overeating Nearly every day Feeling bad about yourself - or that you are a failure or have let yourself or your family down Nearly every day Trouble concentrating on things such as school, work, reading, or watching TV Nearly every day Moving or speaking so slowly that other people could have noticed; or the opposite being so fidgety that others notice Not at all Thoughts of being better off dead, or hurting yourself in some way Nearly every day PHQ 9 Score 18 How difficult have these problems made it for you to do your work, take care of your home and get along with others Somewhat difficult Alcohol Risk Factor Screening: You do not drink alcohol or very rarely. Functional Ability and Level of Safety:  
Hearing Loss Hearing is poor. Activities of Daily Living The home contains: no safety equipment. Patient does total self care Fall Risk Fall Risk Assessment, last 12 mths 1/25/2019 Able to walk? Yes Fall in past 12 months? No  
 
 
Abuse Screen Patient is not abused Cognitive Screening Evaluation of Cognitive Function: 
Has your family/caregiver stated any concerns about your memory: no 
Normal 
 
Patient Care Team  
Patient Care Team: 
Ciara Bermudez MD as PCP - [facility]) Pauline Mojica MD (Orthopedic Surgery) Annita Borden MD (Neurosurgery) Jeanette Gibson MD (Orthopedic Surgery) Assessment/Plan Education and counseling provided: 
Are appropriate based on today's review and evaluation End-of-Life planning (with patient's consent) Diagnoses and all orders for this visit: 
 
1. Medicare annual wellness visit, subsequent 2. Screening for depression 
-     Serjio Newton 3. Screening for alcoholism -     [place] ANNUAL ALCOHOL SCREEN 15 MIN

## 2019-04-03 ENCOUNTER — TELEPHONE (OUTPATIENT)
Dept: FAMILY MEDICINE CLINIC | Age: 64
End: 2019-04-03

## 2019-04-03 DIAGNOSIS — M51.36 DDD (DEGENERATIVE DISC DISEASE), LUMBAR: ICD-10-CM

## 2019-04-03 DIAGNOSIS — M94.0 COSTOCHONDRITIS, ACUTE: ICD-10-CM

## 2019-04-03 NOTE — TELEPHONE ENCOUNTER
----- Message from Nemo Barrios sent at 4/3/2019  3:32 PM EDT -----  Regarding: /Telephone   Pt requesting a call back regarding the pharmacy has not received the Rxs. Also, pt stated he stop smoking.  Best [phone]

## 2019-04-03 NOTE — TELEPHONE ENCOUNTER
Informed pt that PCP out of office this week  Pt reports having a 7 day supply  Will forward to provider

## 2019-04-04 RX ORDER — DIAZEPAM 5 MG/1
TABLET ORAL
Qty: 30 TAB | Refills: 1 | Status: SHIPPED | OUTPATIENT
Start: 2019-04-04 | End: 2019-07-15 | Stop reason: SDUPTHER

## 2019-04-04 RX ORDER — PRAVASTATIN SODIUM 40 MG/1
TABLET ORAL
Qty: 30 TAB | Refills: 2 | Status: SHIPPED | OUTPATIENT
Start: 2019-04-04 | End: 2019-07-01 | Stop reason: SDUPTHER

## 2019-04-04 RX ORDER — TRAZODONE HYDROCHLORIDE 150 MG/1
TABLET ORAL
Qty: 30 TAB | Refills: 2 | Status: SHIPPED | OUTPATIENT
Start: 2019-04-04 | End: 2019-07-01 | Stop reason: SDUPTHER

## 2019-04-04 RX ORDER — DICLOFENAC SODIUM 75 MG/1
TABLET, DELAYED RELEASE ORAL
Qty: 60 TAB | Refills: 1 | Status: SHIPPED | OUTPATIENT
Start: 2019-04-04 | End: 2019-06-17 | Stop reason: SDUPTHER

## 2019-06-20 NOTE — TELEPHONE ENCOUNTER
Pt made aware that the medication was sent to Dr. Yamilet Moreira today and once approved, he will get a call. Pt verbalized understanding.

## 2019-06-20 NOTE — TELEPHONE ENCOUNTER
----- Message from Jigna Huston sent at 6/20/2019 11:48 AM EDT -----  Regarding: /Telephone  Pt requesting a call back regarding status of refill for Rx\"Diclofenac 75 mg\". Also, pt stated the [facility] at [phone] requested refill on 06/15/19 and he's going to be completely out by the weekend. Best [phone]

## 2019-07-15 ENCOUNTER — OFFICE VISIT (OUTPATIENT)
Dept: FAMILY MEDICINE CLINIC | Age: 64
End: 2019-07-15

## 2019-07-15 VITALS
BODY MASS INDEX: 21.54 KG/M2 | DIASTOLIC BLOOD PRESSURE: 87 MMHG | HEIGHT: 72 IN | RESPIRATION RATE: 12 BRPM | SYSTOLIC BLOOD PRESSURE: 135 MMHG | HEART RATE: 63 BPM | WEIGHT: 159 LBS | TEMPERATURE: 98.4 F | OXYGEN SATURATION: 98 %

## 2019-07-15 DIAGNOSIS — E78.5 HYPERLIPIDEMIA, UNSPECIFIED HYPERLIPIDEMIA TYPE: Primary | ICD-10-CM

## 2019-07-15 DIAGNOSIS — R97.20 ELEVATED PSA: ICD-10-CM

## 2019-07-15 DIAGNOSIS — M51.36 DDD (DEGENERATIVE DISC DISEASE), LUMBAR: ICD-10-CM

## 2019-07-15 DIAGNOSIS — Z12.11 SCREEN FOR COLON CANCER: ICD-10-CM

## 2019-07-15 DIAGNOSIS — F51.01 PRIMARY INSOMNIA: ICD-10-CM

## 2019-07-15 RX ORDER — DIAZEPAM 5 MG/1
TABLET ORAL
Qty: 30 TAB | Refills: 0 | Status: SHIPPED | OUTPATIENT
Start: 2019-07-15 | End: 2019-09-10 | Stop reason: SDUPTHER

## 2019-07-15 NOTE — PROGRESS NOTES
1. Have you been to the ER, urgent care clinic since your last visit? Hospitalized since your last visit? No    2. Have you seen or consulted any other health care providers outside of the [address] [place] [address] since your last visit? Include any pap smears or colon screening.  No  Reviewed record in preparation for visit and have necessary documentation  Pt did not bring medication to office visit for review    Goals that were addressed and/or need to be completed during or after this appointment include     Health Maintenance Due   Topic Date Due    FOBT Q 1 YEAR AGE 50-75  11/17/2017    Pneumococcal 65+ years (1 of 2 - PCV13) 12/15/2018

## 2019-07-15 NOTE — PATIENT INSTRUCTIONS
Colon Cancer Screening: Care Instructions  Your Care Instructions    Colorectal cancer occurs in the colon or rectum. That's the lower part of your digestive system. It is the second-leading cause of cancer deaths in the [place]. It often starts with small growths called polyps in the colon or rectum. Polyps are usually found with screening tests. Depending on the type of test, any polyps found may be removed during the tests. Colorectal cancer usually does not cause symptoms at first. But regular tests can help find it early, before it spreads and becomes harder to treat. Experts advise routine tests for colon cancer for people starting at age 48. And they advise people with a higher risk of colon cancer to get tested sooner. Talk with your doctor about when you should start testing. Discuss which tests you need. Follow-up care is a key part of your treatment and safety. Be sure to make and go to all appointments, and call your doctor if you are having problems. It's also a good idea to know your test results and keep a list of the medicines you take. What are the main screening tests for colon cancer? · Stool tests. These include the fecal immunochemical test (FIT) and the fecal occult blood test (FOBT). These tests check stool samples for signs of cancer. If your test is positive, you will need to have a colonoscopy. · Sigmoidoscopy. This test lets your doctor look at the lining of your rectum and the lowest part of your colon. Your doctor uses a lighted tube called a sigmoidoscope. This test can't find cancers or polyps in the upper part of your colon. In some cases, polyps that are found can be removed. But if your doctor finds polyps, you will need to have a colonoscopy to check the upper part of your colon. · Colonoscopy. This test lets your doctor look at the lining of your rectum and your entire colon. The doctor uses a thin, flexible tool called a colonoscope.  It can also be used to remove polyps or get a tissue sample (biopsy). What tests do you need? The following guidelines are for people age 48 and over who are not at high risk for colorectal cancer. You may have at least one of these tests as directed by your doctor. · Fecal immunochemical test (FIT) or fecal occult blood test (FOBT) every year  · Sigmoidoscopy every 5 years  · Colonoscopy every 10 years  If you are age 68 to 80, you can work with your doctor to decide if screening is a good option. If you are age 80 or older, your doctor will likely advise that screening is not helpful. Talk with your doctor about when you need to be tested. And discuss which tests are right for you. Your doctor may recommend earlier or more frequent testing if you:  · Have had colorectal cancer before. · Have had colon polyps. · Have symptoms of colorectal cancer. These include blood in your stool and changes in your bowel habits. · Have a parent, brother or sister, or child with colon polyps or colorectal cancer. · Have a bowel disease. This includes ulcerative colitis and Crohn's disease. · Have a rare polyp syndrome that runs in families, such as familial adenomatous polyposis (FAP). · Have had radiation treatments to the belly or pelvis. When should you call for help? Watch closely for changes in your health, and be sure to contact your doctor if:    · You have any changes in your bowel habits.     · You have any problems. Where can you learn more? Go to http://catracho-bryce.info/. Enter M541 in the search box to learn more about \"Colon Cancer Screening: Care Instructions. \"  Current as of: March 27, 2018  Content Version: 11.9  © 8079-5641 [employer]. Care instructions adapted under license by [employer] (which disclaims liability or warranty for this information).  If you have questions about a medical condition or this instruction, always ask your healthcare professional. [employer] disclaims any warranty or liability for your use of this information.

## 2019-07-15 NOTE — PROGRESS NOTES
Chief Complaint   Patient presents with    Follow-up     he is a 70y.o. year old male who presents for 6 month follow up of his chronic health conditions. Patient with hx of insomnia, depression, HLD and chronic LBP. He is fasting for lab work. Patient denies HA, dizziness, SOB, CP, abdominal pain, dysuria, acute myalgias or arthralgias. He has lost weight since last OV. He is due for 3 year colonoscopy. BP Readings from Last 3 Encounters:   07/15/19 135/87   03/04/19 138/71   01/25/19 141/79     Wt Readings from Last 3 Encounters:   07/15/19 159 lb (72.1 kg)   03/04/19 167 lb (75.8 kg)   01/25/19 167 lb (75.8 kg)     Body mass index is 21.56 kg/m². Hyperlipidemia    Cardiovascular risks for him are: LDL goal is under 100, hyperlipidemia, smoker.    Currently he takes Pravachol (pravastatin)   Our goal is to lower hyperlipidemia to decrease the risks of strokes and heart attacks      Patient Active Problem List   Diagnosis Code    Hyperlipemia E78.5    Insomnia G47.00    History of left hip replacement Z96.642    Depression F32.9    Tobacco abuse Z72.0    Conductive hearing loss, bilateral H90.0     Past Surgical History:   Procedure Laterality Date    COLONOSCOPY N/A 8/23/2016    COLONOSCOPY performed by Yvonne Garay MD at [address] HX COLONOSCOPY      HX GI      surgery for hemorrhoids    HX HEENT      all teeth removed    HX ORTHOPAEDIC      left hip replacement    HX ORTHOPAEDIC      infection after sutured finger/then healed/then had a growth removed 2 1/2 yrs later - benign    HX OTHER SURGICAL      growth on left side of testicle removed - benign     Social History     Socioeconomic History    Marital status: SINGLE     Spouse name: Not on file    Number of children: Not on file    Years of education: Not on file    Highest education level: Not on file   Occupational History    Not on file   Social Needs    Financial resource strain: Not on file    Food insecurity: Worry: Not on file     Inability: Not on file    Transportation needs:     Medical: Not on file     Non-medical: Not on file   Tobacco Use    Smoking status: Former Smoker     Packs/day: 0.50     Years: 45.00     Pack years: 22.50    Smokeless tobacco: Never Used   Substance and Sexual Activity    Alcohol use: No    Drug use: No    Sexual activity: Never   Lifestyle    Physical activity:     Days per week: Not on file     Minutes per session: Not on file    Stress: Not on file   Relationships    Social connections:     Talks on phone: Not on file     Gets together: Not on file     Attends Rastafari service: Not on file     Active member of club or organization: Not on file     Attends meetings of clubs or organizations: Not on file     Relationship status: Not on file    Intimate partner violence:     Fear of current or ex partner: Not on file     Emotionally abused: Not on file     Physically abused: Not on file     Forced sexual activity: Not on file   Other Topics Concern    Not on file   Social History Narrative    Not on file     Family History   Problem Relation Age of Onset    Cancer Mother         breast    Stroke Mother         x2    Cancer Father         throat/lung    Heart Disease Father     Lung Disease Father     Thyroid Disease Father     Heart Attack Father     Suicide Brother     Psychiatric Disorder Brother     Heart Attack Paternal Uncle     Heart Attack Paternal Uncle         x3     Current Outpatient Medications   Medication Sig    diazePAM (VALIUM) 5 mg tablet TAKE ONE TABLET BY MOUTH EVERY 8 HOURS AS NEEDED max 3 IN 24 hours    pravastatin (PRAVACHOL) 40 mg tablet TAKE ONE TABLET BY MOUTH EVERY NIGHT    traZODone (DESYREL) 150 mg tablet TAKE ONE TABLET BY MOUTH nightly    VENTOLIN HFA 90 mcg/actuation inhaler inhale 2 PUFFS by mouth EVERY 6 HOURS AS NEEDED FOR WHEEZING    tiZANidine (ZANAFLEX) 4 mg tablet TAKE ONE TABLET BY MOUTH TWICE DAILY    diclofenac EC (VOLTAREN) 75 mg EC tablet TAKE ONE TABLET BY MOUTH TWICE DAILY    buPROPion XL (WELLBUTRIN XL) 150 mg tablet TAKE ONE TABLET BY MOUTH EVERY MORNING    montelukast (SINGULAIR) 10 mg tablet TAKE ONE TABLET BY MOUTH EVERY DAY    tamsulosin (FLOMAX) 0.4 mg capsule TAKE ONE CAPSULE BY MOUTH EVERY DAY    omeprazole (PRILOSEC) 20 mg capsule      No current facility-administered medications for this visit. Allergies   Allergen Reactions    Percocet [Oxycodone-Acetaminophen] Rash and Itching     itching       Review of Systems:  Constitutional: Negative for fatigue, malaise  Derm: Negative for rash or lesion  HEENT: Negative for acute hearing changes  Resp: Negative for cough, wheezing or SOB  CV: Negative for chest pain, dizziness or palpitations  Gastrointestinal: see HPI, Negative for nausea or abdominal pain  Genital/urinary: Negative for dysuria or voiding dysfunction  MS: hx of LBP  Neuro: Negative for HA, weakness or paresthesia  Psych: History of depression, insomnia     Vitals:    07/15/19 0810 07/15/19 0838   BP: (!) 142/95 135/87   Pulse: 63    Resp: 12    Temp: 98.4 °F (36.9 °C)    TempSrc: Oral    SpO2: 98%    Weight: 159 lb (72.1 kg)    Height: 6' (1.829 m)        Physical Examination:  General: thin, in no acute distress  Head: Normocephalic, atraumatic  Eyes: Sclera clear, EOMI  Neck: Normal range of motion  Respiratory: CTAB with symmetrical, unlabored effort  Cardiovascular: Normal S1, S2, Regular rate and rhythm, no carotid bruit  Extremities: FROM, antalgic gait,  Neurologic: No focal deficits  Psych: affect appropriate    Diagnoses and all orders for this visit:    1. Hyperlipidemia, unspecified hyperlipidemia type  -     LIPID PANEL  -     MAGNESIUM  -     METABOLIC PANEL, COMPREHENSIVE  -     TSH 3RD GENERATION  -     HGB & HCT    2. Elevated PSA  -     PSA, DIAGNOSTIC (PROSTATE SPECIFIC AG)    3. Primary insomnia    4.  Screen for colon cancer  -     REFERRAL TO GASTROENTEROLOGY    5. DDD (degenerative disc disease), lumbar  -     diazePAM (VALIUM) 5 mg tablet; TAKE ONE TABLET BY MOUTH EVERY 8 HOURS AS NEEDED max 3 IN 24 hours        I have discussed the diagnosis with the patient and the intended plan as seen in the above orders. The patient expresses understanding and agreement with our plan of care. The patient has received an after-visit summary. The patient knows to call our office if there are any questions or concerns regarding diagnosis and treatment plans. I have discussed medication side effects and warnings with the patient as well. Follow-up and Dispositions    · Return in about 6 months (around 1/15/2020), or if symptoms worsen or fail to improve.

## 2019-07-16 LAB
ALBUMIN SERPL-MCNC: 4.6 G/DL (ref 3.6–4.8)
ALBUMIN/GLOB SERPL: 2.2 {RATIO} (ref 1.2–2.2)
ALP SERPL-CCNC: 87 IU/L (ref 39–117)
ALT SERPL-CCNC: 33 IU/L (ref 0–44)
AST SERPL-CCNC: 26 IU/L (ref 0–40)
BILIRUB SERPL-MCNC: 0.5 MG/DL (ref 0–1.2)
BUN SERPL-MCNC: 17 MG/DL (ref 8–27)
BUN/CREAT SERPL: 18 (ref 10–24)
CALCIUM SERPL-MCNC: 9.6 MG/DL (ref 8.6–10.2)
CHLORIDE SERPL-SCNC: 104 MMOL/L (ref 96–106)
CHOLEST SERPL-MCNC: 189 MG/DL (ref 100–199)
CO2 SERPL-SCNC: 21 MMOL/L (ref 20–29)
CREAT SERPL-MCNC: 0.96 MG/DL (ref 0.76–1.27)
GLOBULIN SER CALC-MCNC: 2.1 G/DL (ref 1.5–4.5)
GLUCOSE SERPL-MCNC: 96 MG/DL (ref 65–99)
HCT VFR BLD AUTO: 43.1 % (ref 37.5–51)
HDLC SERPL-MCNC: 40 MG/DL
HGB BLD-MCNC: 14.4 G/DL (ref 13–17.7)
LDLC SERPL CALC-MCNC: 130 MG/DL (ref 0–99)
MAGNESIUM SERPL-MCNC: 2.1 MG/DL (ref 1.6–2.3)
POTASSIUM SERPL-SCNC: 4.9 MMOL/L (ref 3.5–5.2)
PROT SERPL-MCNC: 6.7 G/DL (ref 6–8.5)
PSA SERPL-MCNC: 2.7 NG/ML (ref 0–4)
SODIUM SERPL-SCNC: 140 MMOL/L (ref 134–144)
TRIGL SERPL-MCNC: 93 MG/DL (ref 0–149)
TSH SERPL DL<=0.005 MIU/L-ACNC: 1.49 UIU/ML (ref 0.45–4.5)
VLDLC SERPL CALC-MCNC: 19 MG/DL (ref 5–40)

## 2019-09-03 ENCOUNTER — OFFICE VISIT (OUTPATIENT)
Dept: FAMILY MEDICINE CLINIC | Age: 64
End: 2019-09-03

## 2019-09-03 VITALS
RESPIRATION RATE: 16 BRPM | TEMPERATURE: 98 F | BODY MASS INDEX: 21.81 KG/M2 | DIASTOLIC BLOOD PRESSURE: 80 MMHG | HEIGHT: 72 IN | HEART RATE: 54 BPM | SYSTOLIC BLOOD PRESSURE: 144 MMHG | WEIGHT: 161 LBS | OXYGEN SATURATION: 97 %

## 2019-09-03 DIAGNOSIS — Q63.1 HORSESHOE KIDNEY: ICD-10-CM

## 2019-09-03 DIAGNOSIS — N20.0 KIDNEY STONE ON RIGHT SIDE: Primary | ICD-10-CM

## 2019-09-03 NOTE — PROGRESS NOTES
1. Have you been to the ER, urgent care clinic, or been hospitalized since your last visit? Yes [facility] for kidney stone      2. Have you seen or consulted any other health care providers outside of the [address] [place] [address] since your last visit? No     Reviewed record in preparation for visit and have necessary documentation  Goals that were addressed and/or need to be completed during or after this appointment include   Health Maintenance Due   Topic Date Due    FOBT Q 1 YEAR AGE 50-75  11/17/2017    Pneumococcal 65+ years (1 of 2 - PCV13) 12/15/2018    Influenza Age 5 to Adult  08/01/2019       Patient is accompanied by self I have received verbal consent from Theresa Peter to discuss any/all medical information while they are present in the room.

## 2019-09-03 NOTE — PROGRESS NOTES
Progress Note    Patient: Jana Gaston MRN: [id number]  SSN: [id number]    YOB: 1955  Age: 70 y.o. Sex: male        Chief Complaint   Patient presents with    Kidney Stone     follow up ER visit on Saturday (CT was done, stones in Right kidney, \"kidney is horseshoed\") requesting referral to urology         Subjective:     Problems addressed:  Encounter Diagnoses     ICD-10-CM ICD-9-CM   1. Kidney stone on right side N20.0 592.0   2. 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 Pt Went to [facility] ER in [place] 3 days ago due to sever RLQ abdominal pain, R low back pain, and vomiting, found kidney stones and \"horseshoed\" kidney, pt requests urology referral. Since being discharged from the ER, pt using strainer, saw something \"a little broken up, like sand\" in his urine. Was given prescription for vomiting, but hasn't required it. Forgot ER paperwork, will request records. Pt denies any fever, chills, blood in urine or stool, abdominal pain, or dysuria in the past 2 days.          Current and past medical information:    Current Medications after this visit[de-identified]     Current Outpatient Medications   Medication Sig    diclofenac EC (VOLTAREN) 75 mg EC tablet TAKE ONE TABLET BY MOUTH TWICE DAILY    traZODone (DESYREL) 150 mg tablet TAKE ONE TABLET BY MOUTH nightly    tamsulosin (FLOMAX) 0.4 mg capsule TAKE ONE CAPSULE BY MOUTH EVERY DAY    montelukast (SINGULAIR) 10 mg tablet TAKE ONE TABLET BY MOUTH EVERY DAY    diazePAM (VALIUM) 5 mg tablet TAKE ONE TABLET BY MOUTH EVERY 8 HOURS AS NEEDED max 3 IN 24 hours    pravastatin (PRAVACHOL) 40 mg tablet TAKE ONE TABLET BY MOUTH EVERY NIGHT    VENTOLIN HFA 90 mcg/actuation inhaler inhale 2 PUFFS by mouth EVERY 6 HOURS AS NEEDED FOR WHEEZING    tiZANidine (ZANAFLEX) 4 mg tablet TAKE ONE TABLET BY MOUTH TWICE DAILY    buPROPion XL (WELLBUTRIN XL) 150 mg tablet TAKE ONE TABLET BY MOUTH EVERY MORNING  omeprazole (PRILOSEC) 20 mg capsule      No current facility-administered medications for this visit. Patient Active Problem List    Diagnosis Date Noted    Conductive hearing loss, bilateral 07/25/2018    Depression 10/31/2015    Tobacco abuse 10/31/2015    History of left hip replacement 08/06/2015    Hyperlipemia 01/28/2015    Insomnia 01/28/2015       Past Medical History:   Diagnosis Date    Adverse effect of anesthesia     \"major fear of needles\"/raw feeling down throat    Arthritis     osteo    Chronic obstructive pulmonary disease (HCC)     Chronic pain     lower back - stenosis - had injections/steroids    Hypercholesterolemia     Ill-defined condition     low BP but not a problem    Psychiatric disorder     depression       Allergies   Allergen Reactions    Percocet [Oxycodone-Acetaminophen] Rash and Itching     itching       Past Surgical History:   Procedure Laterality Date    COLONOSCOPY N/A 8/23/2016    [facility] ENDOSCOPY    HX COLONOSCOPY      HX GI      surgery for hemorrhoids    HX HEENT      all teeth removed    HX ORTHOPAEDIC      left hip replacement    HX ORTHOPAEDIC      infection after sutured finger/then healed/then had a growth removed 2 1/2 yrs later - benign    HX OTHER SURGICAL      growth on left side of testicle removed - benign       Social History     Socioeconomic History    Marital status: SINGLE     Spouse name: Not on file    Number of children: Not on file    Years of education: Not on file    Highest education level: Not on file   Tobacco Use    Smoking status: Former Smoker     Packs/day: 0.50     Years: 45.00     Pack years: 22.50    Smokeless tobacco: Never Used   Substance and Sexual Activity    Alcohol use: No    Drug use: No    Sexual activity: Never         Review of Systems   Constitutional: Negative for chills and fever. Gastrointestinal: Negative for nausea and vomiting.    Genitourinary: Negative for dysuria and hematuria. Musculoskeletal: Negative for falls. Objective:     Vitals:    09/03/19 0949 09/03/19 0955   BP: 164/88 144/80   Pulse: (!) 54    Resp: 16    Temp: 98 °F (36.7 °C)    TempSrc: Oral    SpO2: 97%    Weight: 161 lb (73 kg)    Height: 6' (1.829 m)       Body mass index is 21.84 kg/m². Physical Exam   Constitutional: He appears well-developed and well-nourished. No distress. HENT:   Head: Normocephalic and atraumatic. Right Ear: External ear normal.   Left Ear: External ear normal.   Mouth/Throat: Oropharynx is clear and moist. No oropharyngeal exudate. Eyes: Conjunctivae are normal.   Cardiovascular: Normal rate, regular rhythm and normal heart sounds. No murmur heard. Pulmonary/Chest: Effort normal and breath sounds normal. No respiratory distress. He has no wheezes. Abdominal: Soft. Bowel sounds are normal. He exhibits no distension. There is no tenderness. Musculoskeletal: He exhibits no deformity. Neurological: He is alert. Skin: Skin is warm and dry. He is not diaphoretic. Psychiatric: He has a normal mood and affect. His behavior is normal.        Health Maintenance Due   Topic Date Due    FOBT Q 1 YEAR AGE 50-75  11/17/2017    Pneumococcal 65+ years (1 of 2 - PCV13) 12/15/2018    Influenza Age 9 to Adult  08/01/2019       Assessment and orders:     Encounter Diagnoses     ICD-10-CM ICD-9-CM   1. Kidney stone on right side N20.0 592.0   2. 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 Kidney stone on right side - symptoms resolved, pt saw sediment in urine strainer, will request ER records  - REFERRAL TO UROLOGY  -Return to clinic or ER if symptoms worsen    2.  Horseshoe kidney - pt states he has never been told he had any wrongs with his kidneys before  - REFERRAL TO UROLOGY          Plan of care:  Discussed diagnoses in detail with patient. Medication risks/benefits/side effects discussed with patient. All of the patient's questions were addressed. The patient understands and agrees with our plan of care. The patient knows to call back if they are unsure of or forget any changes we discussed today or if the symptoms change. The patient received an After-Visit Summary which contains VS, orders, medication list and allergy list. This can be used as a \"mini-medical record\" should they have to seek medical care while out of town. Follow-up and Dispositions    · Return in about 4 months (around 1/3/2020), or if symptoms worsen or fail to improve, for Chronic Medical Problems.          Future Appointments   Date Time Provider Marzena Chopra   9/10/2019  2:10 PM Tati Valle MD [facility]       Signed By: Sania Houston MD     September 3, 2019

## 2019-09-03 NOTE — PATIENT INSTRUCTIONS
Learning About Diet for Kidney Stone Prevention  What are kidney stones? Kidney stones are made of salts and minerals in the urine that form small \"corrie. \" Stones can form in the kidneys and the ureters (the tubes that lead from the kidneys to the bladder). They can also form in the bladder. Stones may not cause a problem as long as they stay in the kidneys. But they can cause sudden, severe pain. Pain is most likely when the stones travel from the kidneys to the bladder. Kidney stones can cause bloody urine. Kidney stones often run in families. You are more likely to get them if you don't drink enough fluids, mainly water. Certain foods and drinks and some dietary supplements may also increase your risk for kidney stones if you consume too much of them. What can you do to prevent kidney stones? Changing what you eat may not prevent all types of kidney stones. But for people who have a history of certain kinds of kidney stones, some changes in diet may help. A dietitian can help you set up a meal plan that includes healthy, low-oxalate choices. Here are some general guidelines to get you started. Plan your meals and snacks around foods that are low in oxalate. These foods include:  · Corn, kale, parsnips, and squash,. · Beef, chicken, pork, turkey, and fish. · Milk, butter, cheese, and yogurt. You can eat certain foods that are medium-high in oxalate, but eat them only once in a while. These foods include:  · Bread. · Brown rice. · English muffins. · Figs. · Popcorn. · String beans. · Tomatoes. Limit very high-oxalate foods, including:  · Black tea. · Coffee. · Chocolate. · Dark green vegetables. · Nuts. Here are some other things you can do to help prevent kidney stones. · Drink plenty of fluids. If you have kidney, heart, or liver disease and have to limit fluids, talk with your doctor before you increase the amount of fluids you drink.   · Do not take more than the recommended daily dose of vitamins C and D.  · Limit the salt in your diet. · Eat a balanced diet that is not too high in protein. Follow-up care is a key part of your treatment and safety. Be sure to make and go to all appointments, and call your doctor if you are having problems. It's also a good idea to know your test results and keep a list of the medicines you take. Where can you learn more? Go to http://catracho-bryce.info/. Enter C138 in the search box to learn more about \"Learning About Diet for Kidney Stone Prevention. \"  Current as of: November 7, 2018  Content Version: 12.1  © 2826-9127 [employer], [employer]. Care instructions adapted under license by [employer] (which disclaims liability or warranty for this information). If you have questions about a medical condition or this instruction, always ask your healthcare professional. [address] any warranty or liability for your use of this information.

## 2019-09-04 DIAGNOSIS — M94.0 COSTOCHONDRITIS, ACUTE: ICD-10-CM

## 2019-09-04 NOTE — TELEPHONE ENCOUNTER
[facility] states to the Pt that they sent this thru to us three days ago and again today. I do not see it. Have not had any medication since Monday morning. Pt states he thought he had another refill on this medication.

## 2019-09-05 RX ORDER — DICLOFENAC SODIUM 75 MG/1
TABLET, DELAYED RELEASE ORAL
Qty: 60 TAB | Refills: 2 | Status: SHIPPED | OUTPATIENT
Start: 2019-09-05 | End: 2019-12-06 | Stop reason: SDUPTHER

## 2019-09-10 ENCOUNTER — OFFICE VISIT (OUTPATIENT)
Dept: FAMILY MEDICINE CLINIC | Age: 64
End: 2019-09-10

## 2019-09-10 VITALS
HEART RATE: 69 BPM | DIASTOLIC BLOOD PRESSURE: 97 MMHG | SYSTOLIC BLOOD PRESSURE: 170 MMHG | OXYGEN SATURATION: 97 % | TEMPERATURE: 98.4 F | BODY MASS INDEX: 21.54 KG/M2 | WEIGHT: 159 LBS | HEIGHT: 72 IN | RESPIRATION RATE: 16 BRPM

## 2019-09-10 DIAGNOSIS — N20.0 KIDNEY STONE ON RIGHT SIDE: Primary | ICD-10-CM

## 2019-09-10 DIAGNOSIS — Z79.899 MEDICATION MANAGEMENT: ICD-10-CM

## 2019-09-10 DIAGNOSIS — F17.200 TOBACCO DEPENDENCE: ICD-10-CM

## 2019-09-10 DIAGNOSIS — Q63.1 HORSESHOE KIDNEY: ICD-10-CM

## 2019-09-10 DIAGNOSIS — M51.36 DDD (DEGENERATIVE DISC DISEASE), LUMBAR: ICD-10-CM

## 2019-09-10 LAB
AMPHETAMINE QL URINE POC: NEGATIVE
BARBITURATES UR POC: NEGATIVE
BENZODIAZEPINES UR POC: NORMAL
BILIRUB UR QL STRIP: NEGATIVE
COCAINE QL URINE POC: NEGATIVE
GLUCOSE UR-MCNC: NEGATIVE MG/DL
KETONES P FAST UR STRIP-MCNC: NORMAL MG/DL
LOT EXP DATE POC: NORMAL
LOT NUMBER POC: NORMAL
MARIJUANA (THC) QL URINE POC: NEGATIVE
MDMA/ECSTASY UR POC: NEGATIVE
METHADONE QL URINE POC: NEGATIVE
METHAMPHETAMINE QL URINE POC: NEGATIVE
OPIATES 300 QL URINE POC: NEGATIVE
OXYCODONE UR POC: NEGATIVE
PH UR STRIP: 6 [PH] (ref 4.6–8)
PHENCYCLIDINE QL URINE POC: NEGATIVE
PROT UR QL STRIP: NORMAL
SP GR UR STRIP: 1.02 (ref 1–1.03)
TRICYCLIC ANTIDEPRESSANTS (TCA) QL URINE POC: NEGATIVE
UA UROBILINOGEN AMB POC: NORMAL (ref 0.2–1)
URINALYSIS CLARITY POC: CLEAR
URINALYSIS COLOR POC: YELLOW
URINE BLOOD POC: NORMAL
URINE LEUKOCYTES POC: NORMAL
URINE NITRITES POC: NEGATIVE

## 2019-09-10 RX ORDER — DIAZEPAM 5 MG/1
TABLET ORAL
Qty: 30 TAB | Refills: 0 | Status: SHIPPED | OUTPATIENT
Start: 2019-09-10 | End: 2019-12-03 | Stop reason: SDUPTHER

## 2019-09-10 NOTE — LETTER
Name:Kenya BRUNNER:[birth year] MR #:[id number] [address] [facility] Page 1 of 5 CONTROLLED SUBSTANCE AGREEMENT I may be prescribed medications that are controlled substances as part  of my treatment plan for management of my medical condition(s). The goal of my treatment plan is to maintain and/or improve my health and wellbeing. Because controlled substances have an increased risk of abuse or harm, continual re-evaluation is needed determine if the goals of my treatment plan are being met for my safety and the safety of others. Mauro Marte  am entering into this Controlled Substance Agreement with my provider, __________________________________ at [facility] . I understand that successful treatment requires mutual trust and honesty between me and my provider. I understand that there are state and federal laws and regulations which apply to the medications that my provider may prescribe that must be followed. I understand there are risks and benefits ts of taking the medicines that my provider may prescribe. I understand and agree that following this Agreement is necessary in continuing my provider-patient relationship and success of my treatment plan. As a part of my treatment plan, I agree to the following: COMMUNICATION: 
 
1. I will communicate fully with my provider about my medical condition(s), including the effect on my daily life and how well my medications are helping. I will tell my provider all of the medications that I take for any reason, including medications I receive from another health care provider, and will notify my provider about all issues, problems or concerns, including any side effects, which may be related to my medications. I understand that this information allows my provider to adjust my treatment plan to help manage my medical condition.  I understand that this information will become part of my permanent medical record. 2. I will notify my provider if I have a history of alcohol/drug misuse/addiction or if I have had treatment for alcohol/drug addiction in the past, or if I have a new problem with or concern about alcohol/drug use/addiction, because this increases the likelihood of high risk behaviors and may lead to serious medical conditions. 3. Females Only: I will notify my provider if I am or become pregnant, or if I intend to become pregnant, or if I intend to breastfeed. I understand that communication of these issues with my provider is important, due to possible effects my medication could have on an unborn fetus or breastfeeding child. Initials_____ Name:Haven Crabtree NMI:[birth year] MR #:[id number] [address] [place] [address] Page 2 of 5 MISUSE OF MEDICATIONS / DRUGS: 
 
1. I agree to take all controlled substances as prescribed, and will not misuse or abuse any controlled substances prescribed by my provider. For my safety, I will not increase the amount of medicine I take without first talking with and getting permission from my provider. 2. If I have a medical emergency, another health care provider may prescribe me medication. If I seek emergency treatment, I will notify my provider within seventy-two (72) hours. 3. I understand that my provider may discuss my use and/or possible misuse/abuse of controlled substances and alcohol, as appropriate, with any health care provider involved in my care, pharmacist or legal authority. ILLEGAL DRUGS: 
 
1. I will not use illegal drugs of any kind, including but not limited to marijuana, heroin, cocaine, or any prescription drug which is not prescribed to me. DRUG DIVERSION / PRESCRIPTION FRAUD: 
 
1. I will not share, sell, trade, give away, or otherwise misuse my prescriptions or medications. 2. I will not alter any prescriptions provided to me by my provider. SINGLE PROVIDER: 
 
1. I agree that all controlled substances that I take will be prescribed only by my provider (or his/her covering provider) under this Agreement. This agreement does not prevent me from seeking emergency medical treatment or receiving pain management related to a surgery. PROTECTING MEDICATIONS: 
 
1. I am responsible for keeping my prescriptions and medications in a safe and secure place including safeguarding them from loss or theft. I understand that lost, stolen or damaged/destroyed prescriptions or medications will not be replaced. Initials____ Name:Haven Alfaro GPV:[birth year] MR #:[id number] [address] [place] [address] Page 3 of 5 PRESCRIPTION RENEWALS/REFILLS: 
 
1. I will follow my controlled substance medication schedule as prescribed by my provider. 2. I understand and agree that I will make any requests for renewals or refills of my prescriptions only at the time of an office visit or during my providers regular office hours subject to the prescription refill requirements of the individual practice. 3. I understand that my provider may not call in prescriptions for controlled substances to my pharmacy. 4. I understand that my provider may adjust or discontinue these medications as deemed appropriate for my medical treatment plan. This Agreement does not guarantee the prescription of controlled medications. 5. I agree that if my medications are adjusted or discontinued, I will properly dispose of any remaining medications. I understand that I will be required to dispose of any remaining controlled medications prior to being provided with any prescriptions for other controlled medications.  
 
6. I understand that the renewal of my prescription depends on my medical condition, my consistent participation, and my adherence with my treatment plan and this Agreement. 7. I understand that if I do not keep an appointment with my provider, I may not receive a renewal or refill for my controlled substance medication. PRESCRIPTION MONITORING / DRUG TESTIN. I understand that my provider may require me to provide urine, saliva or blood for testing at any time. I understand that this testing will be used to monitor for safety and adherence with my treatment plan and this Agreement. 2. I understand that my provider may ask me to provide an observed urine specimen, which means that a nurse or other health care provider may watch me provide urine, and I agree to cooperate if I am asked to provide an observed specimen. 3. I understand that if I do not provide urine, saliva or blood samples within two (2) hours of my providers request, or other timeframe decided by my provider, my treatment plan could be changed, or my prescriptions and medications may be changed or ended. 4. I understand that urine, saliva and blood test results will be a part of my permanent medical record. Initials_____ Name:Haven Castellanos SGY: MR #:[id number] [address] [place] [address] Page 4 of 5 
 
 
1. I authorize my provider and my pharmacy to cooperate fully with any local, state, or federal law enforcement agency in the investigation of any possible misuse, sale, or other diversion of my controlled substance prescriptions or medications. RISKS: 
 
1. I understand that my level of consciousness may be affected from the use of controlled substances, and I understand that there are risks, benefits, effects and potential alternatives (including no treatment) to the medications that my provider has prescribed. 2. I understand that I may become drowsy, tired, dizzy, constipated, and sick to my stomach, or have changes in my mood or in my sleep while taking my medications. I have talked with my provider about these possible side effects, risks, benefits, and alternative treatments, and my provider has answered all of my questions. 3. I understand that I should not suddenly stop taking my medications without first speaking with my provider. I understand that if I suddenly stop taking my medications, I may experience nausea, vomiting, sweating,anxiety, sleeplessness, itching or other uncomfortable feelings. 4. I will not take my medications with alcohol of any kind, including beer, wine or liquor. I understand that drinking alcohol with my medications increases the chances of side effects, including breathing problems or even death. 5. I understand that if I have a history of alcoholism or other drug addiction I may be at increased risk of addiction to my medications. Signs of addiction might include craving, compulsive use, and continued use despite harm. Since addiction is a disease, I understand my provider may decide to change my medications and refer me to appropriate treatment services. I understand that this information would become part of my permanent medical record. Initials_____ Name:Haven Riggs IER:[birth year] MR #:[id number]  [facility] Page 5 of 5  
 
 
6. Females only: Children born to women who regularly take controlled substances are likely to have physical problems and suffer withdrawal symptoms at birth. If I am of child-bearing age, I understand that I should use safe and effective birth control while taking any controlled substances to avoid the impact of medications on an unborn fetus or  child. I agree to notify my provider immediately if I should become pregnant so that my treatment plan can be adjusted. 7. Males only: I understand that chronic use of controlled substances has been associated with low testosterone levels in males which may affect my mood, stamina, sexual desire, and general health. I understand that my provider may order the appropriate laboratory test to determine my testosterone level,and I agree to this testing. ADHERENCE: 
1. I understand that if I do not adhere to this Agreement in any way, my provider may change my prescriptions, stop prescribing controlled substances or end our provider-patient relationship. 2. If my provider decides to stop prescribing medication, or decides to end our provider-patient relationship,my provider may require that I taper my medications slowly. If necessary, my provider may also provide a prescription for other medications to treat my withdrawal symptoms. UNDERSTANDING THIS AGREEMENT: 
 
I understand that my provider may adjust or stop my prescriptions for controlled substances based on my medical condition and my treatment plan. I understand that this Agreement does not guarantee that I will be prescribed medications or controlled substances. I understand that controlled substances may be just one part 
of my treatment plan.  
 
My initial on each page and my signature below shows that I have read each page of this Agreement, I have had an opportunity to ask questions, and all of my questions have been answered to my satisfaction by my provider. By signing below, I agree to comply with this Agreement, and I understand that if I do not follow the Agreements listed above, my provider may stop 
 
_________________________________________  Date/Time 9/10/2019 2:23 PM   
             (Patient Signature) 
 
________________________________________    Date/Time 9/10/2019 2:23 PM 
 (Parent or Guardian Signature if <18 yrs) 
 
_________________________________________ Date/Time 9/10/2019 2:23 PM 
 (Provider Signature)

## 2019-09-10 NOTE — PROGRESS NOTES
1. Have you been to the ER, urgent care clinic, or been hospitalized since your last visit? No     2. Have you seen or consulted any other health care providers outside of the [address] [place] [address] since your last visit? No     Reviewed record in preparation for visit and have necessary documentation  Goals that were addressed and/or need to be completed during or after this appointment include   Health Maintenance Due   Topic Date Due    FOBT Q 1 YEAR AGE 50-75  11/17/2017    Pneumococcal 65+ years (1 of 2 - PCV13) 12/15/2018    Influenza Age 5 to Adult  08/01/2019       Patient is accompanied by self I have received verbal consent from Leon Lira to discuss any/all medical information while they are present in the room.

## 2019-09-15 NOTE — PROGRESS NOTES
Chief Complaint   Patient presents with   Shobha Turner Other     he is a 70y.o. year old male who presents for follow up of ED encounter. Patient seen for RLQ pain and diagnosed with renal calculi. Patient with hx of kidney stones, insomnia, depression, HLD and chronic LBP. Patient denies HA, dizziness, SOB, CP, abdominal pain, dysuria, acute myalgias or arthralgias. BP elevated today. No prior dx of HTN. BP Readings from Last 3 Encounters:   09/10/19 (!) 170/97   09/03/19 144/80   07/15/19 135/87     Wt Readings from Last 3 Encounters:   09/10/19 159 lb (72.1 kg)   09/03/19 161 lb (73 kg)   07/15/19 159 lb (72.1 kg)     Body mass index is 21.56 kg/m². Hyperlipidemia    Cardiovascular risks for him are: LDL goal is under 100, hyperlipidemia, smoker.    Currently he takes Pravachol (pravastatin)   Our goal is to lower hyperlipidemia to decrease the risks of strokes and heart attacks      Patient Active Problem List   Diagnosis Code    Hyperlipemia E78.5    Insomnia G47.00    History of left hip replacement Z96.642    Depression F32.9    Tobacco abuse Z72.0    Conductive hearing loss, bilateral H90.0     Past Surgical History:   Procedure Laterality Date    COLONOSCOPY N/A 8/23/2016    COLONOSCOPY performed by Milton Galvan MD at [address] HX COLONOSCOPY      HX GI      surgery for hemorrhoids    HX HEENT      all teeth removed    HX ORTHOPAEDIC      left hip replacement    HX ORTHOPAEDIC      infection after sutured finger/then healed/then had a growth removed 2 1/2 yrs later - benign    HX OTHER SURGICAL      growth on left side of testicle removed - benign     Social History     Socioeconomic History    Marital status: SINGLE     Spouse name: Not on file    Number of children: Not on file    Years of education: Not on file    Highest education level: Not on file   Occupational History    Not on file   Social Needs    Financial resource strain: Not on file    Food insecurity:     Worry: Not on file     Inability: Not on file    Transportation needs:     Medical: Not on file     Non-medical: Not on file   Tobacco Use    Smoking status: Former Smoker     Packs/day: 0.50     Years: 45.00     Pack years: 22.50    Smokeless tobacco: Never Used   Substance and Sexual Activity    Alcohol use: No    Drug use: No    Sexual activity: Never   Lifestyle    Physical activity:     Days per week: Not on file     Minutes per session: Not on file    Stress: Not on file   Relationships    Social connections:     Talks on phone: Not on file     Gets together: Not on file     Attends [employer] service: Not on file     Active member of club or organization: Not on file     Attends meetings of clubs or organizations: Not on file     Relationship status: Not on file    Intimate partner violence:     Fear of current or ex partner: Not on file     Emotionally abused: Not on file     Physically abused: Not on file     Forced sexual activity: Not on file   Other Topics Concern    Not on file   Social History Narrative    Not on file     Family History   Problem Relation Age of Onset    Cancer Mother         breast    Stroke Mother         x2    Cancer Father         throat/lung    Heart Disease Father     Lung Disease Father     Thyroid Disease Father     Heart Attack Father     Suicide Brother     Psychiatric Disorder Brother     Heart Attack Paternal Uncle     Heart Attack Paternal Uncle         x3     Current Outpatient Medications   Medication Sig    diazePAM (VALIUM) 5 mg tablet TAKE ONE TABLET BY MOUTH EVERY 8 HOURS AS NEEDED max 3 IN 24 hours    diclofenac EC (VOLTAREN) 75 mg EC tablet TAKE ONE TABLET BY MOUTH TWICE DAILY    traZODone (DESYREL) 150 mg tablet TAKE ONE TABLET BY MOUTH nightly    tamsulosin (FLOMAX) 0.4 mg capsule TAKE ONE CAPSULE BY MOUTH EVERY DAY    montelukast (SINGULAIR) 10 mg tablet TAKE ONE TABLET BY MOUTH EVERY DAY    pravastatin (PRAVACHOL) 40 mg tablet TAKE ONE TABLET BY MOUTH EVERY NIGHT    VENTOLIN HFA 90 mcg/actuation inhaler inhale 2 PUFFS by mouth EVERY 6 HOURS AS NEEDED FOR WHEEZING    tiZANidine (ZANAFLEX) 4 mg tablet TAKE ONE TABLET BY MOUTH TWICE DAILY    buPROPion XL (WELLBUTRIN XL) 150 mg tablet TAKE ONE TABLET BY MOUTH EVERY MORNING    omeprazole (PRILOSEC) 20 mg capsule      No current facility-administered medications for this visit. Allergies   Allergen Reactions    Percocet [Oxycodone-Acetaminophen] Rash and Itching     itching       Review of Systems:  Constitutional: Negative for fatigue, malaise  Derm: Negative for rash or lesion  HEENT: Negative for acute hearing changes  Resp: Negative for cough, wheezing or SOB  CV: Negative for chest pain, dizziness or palpitations  Gastrointestinal: see HPI, Negative for nausea or abdominal pain  Genital/urinary: see HPI  MS: hx of LBP, hip replacement  Neuro: Negative for HA, weakness or paresthesia  Psych: History of depression, insomnia     Vitals:    09/10/19 1346 09/10/19 1449   BP: (!) 156/98 (!) 170/97   Pulse: 69    Resp: 16    Temp: 98.4 °F (36.9 °C)    TempSrc: Oral    SpO2: 97%    Weight: 159 lb (72.1 kg)    Height: 6' (1.829 m)        Physical Examination:  General: thin, in no acute distress  Head: Normocephalic, atraumatic  Eyes: Sclera clear, EOMI  Neck: Normal range of motion  Respiratory: CTAB with symmetrical, unlabored effort  Cardiovascular: Normal S1, S2, Regular rate and rhythm, no carotid bruit  Abdomen: soft, non-tender  Extremities: FROM, antalgic gait,  Neurologic: No focal deficits  Psych: affect appropriate    Diagnoses and all orders for this visit:    1. Kidney stone on right side  -     AMB POC URINALYSIS DIP STICK AUTO W/O MICRO    2. Horseshoe kidney    3. DDD (degenerative disc disease), lumbar  -     diazePAM (VALIUM) 5 mg tablet; TAKE ONE TABLET BY MOUTH EVERY 8 HOURS AS NEEDED max 3 IN 24 hours    4. Medication management  -     AMB POC USCREEN 12 DRUG     5.  Tobacco dependence        I have discussed the diagnosis with the patient and the intended plan as seen in the above orders. Advised to increase water consumption. Strongly advised patient to stop all use of tobacco products. The patient expresses understanding and agreement with our plan of care. The patient has received an after-visit summary. The patient knows to call our office if there are any questions or concerns regarding diagnosis and treatment plans. I have discussed medication side effects and warnings with the patient as well. Follow-up and Dispositions    · Return in about 4 months (around 1/10/2020), or if symptoms worsen or fail to improve.

## 2019-09-15 NOTE — PATIENT INSTRUCTIONS
Learning About Diet for Kidney Stone Prevention  What are kidney stones? Kidney stones are made of salts and minerals in the urine that form small \"corrie. \" Stones can form in the kidneys and the ureters (the tubes that lead from the kidneys to the bladder). They can also form in the bladder. Stones may not cause a problem as long as they stay in the kidneys. But they can cause sudden, severe pain. Pain is most likely when the stones travel from the kidneys to the bladder. Kidney stones can cause bloody urine. Kidney stones often run in families. You are more likely to get them if you don't drink enough fluids, mainly water. Certain foods and drinks and some dietary supplements may also increase your risk for kidney stones if you consume too much of them. What can you do to prevent kidney stones? Changing what you eat may not prevent all types of kidney stones. But for people who have a history of certain kinds of kidney stones, some changes in diet may help. A dietitian can help you set up a meal plan that includes healthy, low-oxalate choices. Here are some general guidelines to get you started. Plan your meals and snacks around foods that are low in oxalate. These foods include:  · Corn, kale, parsnips, and squash,. · Beef, chicken, pork, turkey, and fish. · Milk, butter, cheese, and yogurt. You can eat certain foods that are medium-high in oxalate, but eat them only once in a while. These foods include:  · Bread. · Brown rice. · English muffins. · Figs. · Popcorn. · String beans. · Tomatoes. Limit very high-oxalate foods, including:  · Black tea. · Coffee. · Chocolate. · Dark green vegetables. · Nuts. Here are some other things you can do to help prevent kidney stones. · Drink plenty of fluids. If you have kidney, heart, or liver disease and have to limit fluids, talk with your doctor before you increase the amount of fluids you drink.   · Do not take more than the recommended daily dose of vitamins C and D.  · Limit the salt in your diet. · Eat a balanced diet that is not too high in protein. Follow-up care is a key part of your treatment and safety. Be sure to make and go to all appointments, and call your doctor if you are having problems. It's also a good idea to know your test results and keep a list of the medicines you take. Where can you learn more? Go to http://catracho-bryce.info/. Enter C138 in the search box to learn more about \"Learning About Diet for Kidney Stone Prevention. \"  Current as of: November 7, 2018  Content Version: 12.1  © 6491-7625 [employer], [employer]. Care instructions adapted under license by [employer] (which disclaims liability or warranty for this information). If you have questions about a medical condition or this instruction, always ask your healthcare professional. [address] any warranty or liability for your use of this information.

## 2019-09-18 ENCOUNTER — HOSPITAL ENCOUNTER (OUTPATIENT)
Age: 64
Setting detail: OUTPATIENT SURGERY
Discharge: HOME | End: 2019-09-18
Attending: INTERNAL MEDICINE | Admitting: INTERNAL MEDICINE
Payer: MEDICARE

## 2019-09-18 ENCOUNTER — ANESTHESIA EVENT (OUTPATIENT)
Dept: ENDOSCOPY | Age: 64
End: 2019-09-18
Payer: MEDICARE

## 2019-09-18 ENCOUNTER — ANESTHESIA (OUTPATIENT)
Dept: ENDOSCOPY | Age: 64
End: 2019-09-18
Payer: MEDICARE

## 2019-09-18 VITALS
BODY MASS INDEX: 21.54 KG/M2 | WEIGHT: 159 LBS | RESPIRATION RATE: 23 BRPM | HEIGHT: 72 IN | DIASTOLIC BLOOD PRESSURE: 74 MMHG | SYSTOLIC BLOOD PRESSURE: 155 MMHG | HEART RATE: 76 BPM | OXYGEN SATURATION: 95 % | TEMPERATURE: 97.5 F

## 2019-09-18 PROCEDURE — 76040000019: Performed by: INTERNAL MEDICINE

## 2019-09-18 PROCEDURE — 76060000031 HC ANESTHESIA FIRST 0.5 HR: Performed by: INTERNAL MEDICINE

## 2019-09-18 PROCEDURE — 74011000250 HC RX REV CODE- 250: Performed by: NURSE ANESTHETIST, CERTIFIED REGISTERED

## 2019-09-18 PROCEDURE — 74011250636 HC RX REV CODE- 250/636: Performed by: NURSE ANESTHETIST, CERTIFIED REGISTERED

## 2019-09-18 RX ORDER — SODIUM CHLORIDE 0.9 % (FLUSH) 0.9 %
5-40 SYRINGE (ML) INJECTION EVERY 8 HOURS
Status: CANCELLED | OUTPATIENT
Start: 2019-09-18

## 2019-09-18 RX ORDER — FLUMAZENIL 0.1 MG/ML
0.2 INJECTION INTRAVENOUS
Status: CANCELLED | OUTPATIENT
Start: 2019-09-18 | End: 2019-09-18

## 2019-09-18 RX ORDER — DEXTROMETHORPHAN/PSEUDOEPHED 2.5-7.5/.8
1.2 DROPS ORAL
Status: CANCELLED | OUTPATIENT
Start: 2019-09-18

## 2019-09-18 RX ORDER — SODIUM CHLORIDE 9 MG/ML
INJECTION, SOLUTION INTRAVENOUS
Status: DISCONTINUED | OUTPATIENT
Start: 2019-09-18 | End: 2019-09-18 | Stop reason: HOSPADM

## 2019-09-18 RX ORDER — LIDOCAINE HYDROCHLORIDE 20 MG/ML
INJECTION, SOLUTION EPIDURAL; INFILTRATION; INTRACAUDAL; PERINEURAL AS NEEDED
Status: DISCONTINUED | OUTPATIENT
Start: 2019-09-18 | End: 2019-09-18 | Stop reason: HOSPADM

## 2019-09-18 RX ORDER — SODIUM CHLORIDE 0.9 % (FLUSH) 0.9 %
5-40 SYRINGE (ML) INJECTION AS NEEDED
Status: CANCELLED | OUTPATIENT
Start: 2019-09-18

## 2019-09-18 RX ORDER — EPINEPHRINE 0.1 MG/ML
1 INJECTION INTRACARDIAC; INTRAVENOUS
Status: CANCELLED | OUTPATIENT
Start: 2019-09-18 | End: 2019-09-19

## 2019-09-18 RX ORDER — SODIUM CHLORIDE 9 MG/ML
50 INJECTION, SOLUTION INTRAVENOUS CONTINUOUS
Status: CANCELLED | OUTPATIENT
Start: 2019-09-18 | End: 2019-09-18

## 2019-09-18 RX ORDER — ATROPINE SULFATE 0.1 MG/ML
0.5 INJECTION INTRAVENOUS
Status: CANCELLED | OUTPATIENT
Start: 2019-09-18 | End: 2019-09-19

## 2019-09-18 RX ORDER — PROPOFOL 10 MG/ML
INJECTION, EMULSION INTRAVENOUS AS NEEDED
Status: DISCONTINUED | OUTPATIENT
Start: 2019-09-18 | End: 2019-09-18 | Stop reason: HOSPADM

## 2019-09-18 RX ORDER — NALOXONE HYDROCHLORIDE 0.4 MG/ML
0.4 INJECTION, SOLUTION INTRAMUSCULAR; INTRAVENOUS; SUBCUTANEOUS
Status: CANCELLED | OUTPATIENT
Start: 2019-09-18 | End: 2019-09-18

## 2019-09-18 RX ADMIN — PROPOFOL 50 MG: 10 INJECTION, EMULSION INTRAVENOUS at 09:28

## 2019-09-18 RX ADMIN — PROPOFOL 100 MG: 10 INJECTION, EMULSION INTRAVENOUS at 09:32

## 2019-09-18 RX ADMIN — PROPOFOL 100 MG: 10 INJECTION, EMULSION INTRAVENOUS at 09:27

## 2019-09-18 RX ADMIN — SODIUM CHLORIDE: 900 INJECTION, SOLUTION INTRAVENOUS at 09:17

## 2019-09-18 RX ADMIN — PROPOFOL 50 MG: 10 INJECTION, EMULSION INTRAVENOUS at 09:30

## 2019-09-18 RX ADMIN — LIDOCAINE HYDROCHLORIDE 40 MG: 20 INJECTION, SOLUTION EPIDURAL; INFILTRATION; INTRACAUDAL; PERINEURAL at 09:24

## 2019-09-18 NOTE — DISCHARGE INSTRUCTIONS
[address]  [address] [facility], [address] [place] [address]          Josselin Nielsen  [id number]  1955    COLON DISCHARGE INSTRUCTIONS    DISCOMFORT:  Redness at IV site- apply warm compress to area; if redness or soreness persist- contact your physician  There may be a slight amount of blood passed from the rectum  Gaseous discomfort- walking, belching will help relieve any discomfort  You may not operate a vehicle for 12 hours  You may not engage in an occupation involving machinery or appliances for rest of today  You may not drink alcoholic beverages for at least 12 hours  Avoid making any critical decisions for at least 24 hour  DIET:   Regular diet. - however -  remember your colon is empty and a heavy meal will produce gas. Avoid these foods:  vegetables, fried / greasy foods, carbonated drinks for today         ACTIVITY:  You may resume your normal daily activities it is recommended that you spend the remainder of the day resting -  avoid any strenuous activity. CALL M.D. ANY SIGN OF:   Increasing pain, nausea, vomiting  Abdominal distension (swelling)  New increased bleeding (oral or rectal)  Fever (chills)  Pain in chest area  Bloody discharge from nose or mouth  Shortness of breath     Follow-up Instructions:   Call Dr. Meena Gaming for any questions or problems. Telephone # [phone]    Should have a repeat colonoscopy in 5 years    Impression:  Diverticulosis  Patient Education        Diverticulosis: Care Instructions  Your Care Instructions  In diverticulosis, pouches called diverticula form in the wall of the large intestine (colon). The pouches do not cause any pain or other symptoms. Most people who have diverticulosis do not know they have it. But the pouches sometimes bleed, and if they become infected, they can cause pain and other symptoms. When this happens, it is called diverticulitis.   Diverticula form when pressure pushes the wall of the colon outward at certain weak points. A diet that is too low in fiber can cause diverticula. Follow-up care is a key part of your treatment and safety. Be sure to make and go to all appointments, and call your doctor if you are having problems. It's also a good idea to know your test results and keep a list of the medicines you take. How can you care for yourself at home? · Include fruits, leafy green vegetables, beans, and whole grains in your diet each day. These foods are high in fiber. · Take a fiber supplement, such as Citrucel or Metamucil, every day if needed. Read and follow all instructions on the label. · Drink plenty of fluids, enough so that your urine is light yellow or clear like water. If you have kidney, heart, or liver disease and have to limit fluids, talk with your doctor before you increase the amount of fluids you drink. · Get at least 30 minutes of exercise on most days of the week. Walking is a good choice. You also may want to do other activities, such as running, swimming, cycling, or playing tennis or team sports. · Cut out foods that cause gas, pain, or other symptoms. When should you call for help? Call your doctor now or seek immediate medical care if:    · You have belly pain.     · You pass maroon or very bloody stools.     · You have a fever.     · You have nausea and vomiting.     · You have unusual changes in your bowel movements or abdominal swelling.     · You have burning pain when you urinate.     · You have abnormal vaginal discharge.     · You have shoulder pain.     · You have cramping pain that does not get better when you have a bowel movement or pass gas.     · You pass gas or stool from your urethra while urinating.    Watch closely for changes in your health, and be sure to contact your doctor if you have any problems. Where can you learn more? Go to http://catracho-bryce.info/.   Enter X181 in the search box to learn more about \"Diverticulosis: Care Instructions. \"  Current as of: November 7, 2018  Content Version: 12.1  © 8166-5153 Healthwise, Incorporated. Care instructions adapted under license by [employer] (which disclaims liability or warranty for this information). If you have questions about a medical condition or this instruction, always ask your healthcare professional. [address] any warranty or liability for your use of this information.

## 2019-09-18 NOTE — ANESTHESIA PREPROCEDURE EVALUATION
Relevant Problems   No relevant active problems       Anesthetic History   No history of anesthetic complications            Review of Systems / Medical History  Patient summary reviewed, nursing notes reviewed and pertinent labs reviewed    Pulmonary  Within defined limits  COPD               Neuro/Psych   Within defined limits           Cardiovascular  Within defined limits                     GI/Hepatic/Renal  Within defined limits              Endo/Other  Within defined limits      Arthritis     Other Findings              Physical Exam    Airway  Mallampati: II  TM Distance: > 6 cm  Neck ROM: normal range of motion   Mouth opening: Normal     Cardiovascular  Regular rate and rhythm,  S1 and S2 normal,  no murmur, click, rub, or gallop             Dental  No notable dental hx       Pulmonary  Breath sounds clear to auscultation               Abdominal  GI exam deferred       Other Findings            Anesthetic Plan    ASA: 3  Anesthesia type: MAC            Anesthetic plan and risks discussed with: Patient

## 2019-09-18 NOTE — PROGRESS NOTES
Enoc Douglass  1955  [id number]    Situation:  Verbal report received from: Awais Alaniz  Procedure: Procedure(s):  COLONOSCOPY    Background:    Preoperative diagnosis: PERSONAL HISTORY OF COLON POLYPS  Postoperative diagnosis: Diverticulosis    :  Dr. Nilsa Ortiz  Assistant(s): Endoscopy Technician-1: Elle Ojeda  Endoscopy RN-1: Tarah Martinez RN    Specimens: * No specimens in log *  H. Pylori  no    Assessment:  Intra-procedure medications   Anesthesia gave intra-procedure sedation and medications, see anesthesia flow sheet yes    Intravenous fluids: NS@ KVO     Vital signs stable     Abdominal assessment: round and soft     Recommendation:  Discharge patient per MD order    Family or Friend   Permission to share finding with family or friend yes

## 2019-09-18 NOTE — ANESTHESIA POSTPROCEDURE EVALUATION
Procedure(s):  COLONOSCOPY. MAC    <BSHSIANPOST>    Vitals Value Taken Time   /74 9/18/2019 10:14 AM   Temp 36.4 °C (97.5 °F) 9/18/2019  9:58 AM   Pulse 74 9/18/2019 10:14 AM   Resp 23 9/18/2019 10:14 AM   SpO2 95 % 9/18/2019 10:14 AM   Vitals shown include unvalidated device data.

## 2019-09-18 NOTE — H&P
[address], [address] [place] [address]                 Soumya Evans is a  70 y.o.  male who presents with history of polyps . Past Medical History:   Diagnosis Date    Adverse effect of anesthesia     \"major fear of needles\"/raw feeling down throat    Arthritis     osteo    Chronic obstructive pulmonary disease (HCC)     Chronic pain     lower back - stenosis - had injections/steroids    Hypercholesterolemia     Ill-defined condition     low BP but not a problem    Psychiatric disorder     depression     Past Surgical History:   Procedure Laterality Date    COLONOSCOPY N/A 8/23/2016    [facility]COPY    HX COLONOSCOPY      HX GI      surgery for hemorrhoids    HX HEENT      all teeth removed    HX ORTHOPAEDIC      left hip replacement    HX ORTHOPAEDIC      infection after sutured finger/then healed/then had a growth removed 2 1/2 yrs later - benign    HX OTHER SURGICAL      growth on left side of testicle removed - benign     Allergies   Allergen Reactions    Percocet [Oxycodone-Acetaminophen] Rash and Itching     itching     Facility-Administered Medications Ordered in Other Encounters   Medication Dose Route Frequency Provider Last Rate Last Dose    0.9% sodium chloride infusion   IntraVENous CONTINUOUS Robina Pratt, CRNA           Visit Vitals  /80   Pulse 88   Resp 20   Ht 6' (1.829 m)   Wt 72.1 kg (159 lb)   SpO2 94%   BMI 21.56 kg/m²           PHYSICAL EXAM:  General: WD, WN. Alert, cooperative, no acute distress    HEENT: NC, Atraumatic. PERRLA, EOMI. Anicteric sclerae. Mallampati score 2  Lungs:  CTA Bilaterally. No Wheezing/Rhonchi/Rales. Heart:  Regular  rhythm,  No murmur (), No Rubs, No Gallops  Abdomen: Soft, Non distended, Non tender.  +Bowel sounds, no HSM  Extremities: No c/c/e  Neurologic:  CN 2-12 gi, Alert and oriented X 3.   No acute neurological distress   Psych:   Good insight. Not anxious nor agitated. Plan:   Endoscopic procedure with MAC.     Gwen Vallejo MD  9/18/2019  9:24 AM

## 2019-09-18 NOTE — PROCEDURES
[address]  Y[place] Lin, [address]         Procedure:  Colonoscopy    :  Tabatha Fischer MD    Surgical Assistant: None    Implants: None    Referring Provider: Jae Lucio MD    Sedation:  MAC anesthesia Propofol      Prior to the procedure its objectives, risks, consequences and alternatives were discussed with the patient who then elected to proceed. The patient had the opportunity to ask questions and those questions were answered. A physical exam was performed. The heart, lungs, and mental status were examined prior to the procedure and found to be satisfactory for conscious sedation and for the procedure. Conscious sedation was initiated by the physician. Continuous pulse oximetry and blood pressure monitoring were used throughout the procedure. After appropriate analgesia and rectal exam, the colonoscope was passed into the anus and passed to the cecum without difficulty. The prep was good. On slow withdrawal of the scope, the cecum, ascending, colon, hepatic flexure, transverse colon, splenic flexure and descending colon were normal. In the sigmoid there were mild diverticulosis. The rectum was normal. Retroflexion exam of the rectum was normal He tolerated the procedure without complication and I recommend a repeat colonoscopy in 5 years. Specimen Removed:  None    Complications: None. EBL:  None.         Tabatha Fischer MD  9/18/2019  9:43 AM

## 2019-11-25 DIAGNOSIS — M48.061 SPINAL STENOSIS OF LUMBAR REGION, UNSPECIFIED WHETHER NEUROGENIC CLAUDICATION PRESENT: ICD-10-CM

## 2019-11-25 DIAGNOSIS — M51.36 DDD (DEGENERATIVE DISC DISEASE), LUMBAR: ICD-10-CM

## 2019-11-25 RX ORDER — TIZANIDINE 4 MG/1
TABLET ORAL
Qty: 180 TAB | Refills: 0 | Status: SHIPPED | OUTPATIENT
Start: 2019-11-25 | End: 2020-03-04 | Stop reason: SDUPTHER

## 2019-12-06 DIAGNOSIS — K21.9 GASTROESOPHAGEAL REFLUX DISEASE, ESOPHAGITIS PRESENCE NOT SPECIFIED: ICD-10-CM

## 2019-12-06 DIAGNOSIS — M94.0 COSTOCHONDRITIS, ACUTE: ICD-10-CM

## 2019-12-06 RX ORDER — OMEPRAZOLE 20 MG/1
20 CAPSULE, DELAYED RELEASE ORAL DAILY
Qty: 30 CAP | Refills: 2 | Status: SHIPPED | OUTPATIENT
Start: 2019-12-06 | End: 2020-02-25

## 2019-12-06 RX ORDER — DICLOFENAC SODIUM 75 MG/1
TABLET, DELAYED RELEASE ORAL
Qty: 60 TAB | Refills: 2 | Status: SHIPPED | OUTPATIENT
Start: 2019-12-06 | End: 2020-02-25

## 2019-12-06 NOTE — TELEPHONE ENCOUNTER
Caller's first/last name:  Godfrey Lopez    Name and dosage of medication:  Voltaren and Omeprazole    Is patient out of medication? Will run out of medication over the weekend    Name of Pharmacy:  [facility]    Best contact number:  [phone]    Further clarification of call:      Patient is requesting a refill on these two medications today.

## 2019-12-26 DIAGNOSIS — F33.41 RECURRENT MAJOR DEPRESSIVE DISORDER, IN PARTIAL REMISSION (HCC): ICD-10-CM

## 2019-12-26 DIAGNOSIS — F17.200 TOBACCO DEPENDENCE: ICD-10-CM

## 2019-12-30 RX ORDER — PRAVASTATIN SODIUM 40 MG/1
TABLET ORAL
Qty: 30 TAB | Refills: 3 | Status: SHIPPED | OUTPATIENT
Start: 2019-12-30 | End: 2020-05-29

## 2019-12-30 RX ORDER — BUPROPION HYDROCHLORIDE 150 MG/1
TABLET ORAL
Qty: 30 TAB | Refills: 3 | Status: SHIPPED | OUTPATIENT
Start: 2019-12-30 | End: 2020-05-29

## 2020-01-02 DIAGNOSIS — M51.36 DDD (DEGENERATIVE DISC DISEASE), LUMBAR: ICD-10-CM

## 2020-01-02 RX ORDER — DIAZEPAM 5 MG/1
TABLET ORAL
Qty: 30 TAB | Refills: 0 | Status: SHIPPED | OUTPATIENT
Start: 2020-01-02 | End: 2020-02-11 | Stop reason: SDUPTHER

## 2020-01-02 NOTE — TELEPHONE ENCOUNTER
reviewed and appropriate. Controlled substance contract on file.     MD RANDY De La Torre & JEAN PAUL [facility]  01/02/20

## 2020-02-10 NOTE — PATIENT INSTRUCTIONS
February 10, 2020 [facility] [address] Dear Adelina Serrano: 
 
Thank you for requesting access to [employer]. Please follow the instructions below to view your test results, access and download parts of your medical record, view details of your past and upcoming appointments, and view your medications online. How Do I Sign Up? 1. In your internet browser, go to [email or web address] 2. Click on the First Time User? Click Here link in the Sign In box. You will see the New Member Sign Up page. 3. Enter your Touch [employer]ments Access Code exactly as it appears below. You will not need to use this code after youve completed the sign-up process. If you do not sign up before the expiration date, you must request a new code. · [employer]ments Access Code: [email or web address] · Expires: 3/26/2020  3:35 PM 
 
4. Enter the last four digits of your Social Security Number (xxxx) and Date of Birth (mm/dd/yyyy) as indicated and click Submit. You will be taken to the next sign-up page. 5. Create a Touch Payments ID. This will be your Touch [employer]ments login ID and cannot be changed, so think of one that is secure and easy to remember. 6. Create a [employer]ments password. You can change your password at any time. 7. Enter your Password Reset Question and Answer. This can be used at a later time if you forget your password. 8. Enter your e-mail address. You will receive e-mail notification when new information is available in [address] 9. Click Sign Up. You can now view and download portions of your medical record. 10. Click the Download Summary menu link to download a portable copy of your medical information. Additional Information: If you require any assistance or have any questions, please contact our [address] at [phone], email at [email or web address] or check online in our Frequently Asked Questions. Remember, MyChart is NOT to be used for urgent needs. For medical emergencies, dial 911. Now available from your iPhone and Android! Sincerely, Minerva Andre  
 
 
new to my practice

## 2020-02-11 ENCOUNTER — OFFICE VISIT (OUTPATIENT)
Dept: FAMILY MEDICINE CLINIC | Age: 65
End: 2020-02-11

## 2020-02-11 ENCOUNTER — HOSPITAL ENCOUNTER (OUTPATIENT)
Dept: LAB | Age: 65
Discharge: HOME | End: 2020-02-11

## 2020-02-11 VITALS
HEART RATE: 72 BPM | WEIGHT: 160 LBS | HEIGHT: 72 IN | SYSTOLIC BLOOD PRESSURE: 143 MMHG | BODY MASS INDEX: 21.67 KG/M2 | DIASTOLIC BLOOD PRESSURE: 86 MMHG | OXYGEN SATURATION: 99 % | RESPIRATION RATE: 18 BRPM | TEMPERATURE: 99 F

## 2020-02-11 DIAGNOSIS — E78.5 HYPERLIPIDEMIA, UNSPECIFIED HYPERLIPIDEMIA TYPE: Primary | ICD-10-CM

## 2020-02-11 DIAGNOSIS — E78.5 HYPERLIPIDEMIA, UNSPECIFIED HYPERLIPIDEMIA TYPE: ICD-10-CM

## 2020-02-11 DIAGNOSIS — F41.9 ANXIETY: ICD-10-CM

## 2020-02-11 DIAGNOSIS — F33.41 RECURRENT MAJOR DEPRESSIVE DISORDER, IN PARTIAL REMISSION (HCC): ICD-10-CM

## 2020-02-11 DIAGNOSIS — M51.36 DDD (DEGENERATIVE DISC DISEASE), LUMBAR: ICD-10-CM

## 2020-02-11 DIAGNOSIS — F17.200 TOBACCO DEPENDENCE: ICD-10-CM

## 2020-02-11 LAB
ALBUMIN SERPL-MCNC: 4 G/DL (ref 3.5–5)
ALBUMIN/GLOB SERPL: 1.3 {RATIO} (ref 1.1–2.2)
ALP SERPL-CCNC: 83 U/L (ref 45–117)
ALT SERPL-CCNC: 26 U/L (ref 12–78)
ANION GAP SERPL CALC-SCNC: 3 MMOL/L (ref 5–15)
AST SERPL-CCNC: 14 U/L (ref 15–37)
BILIRUB SERPL-MCNC: 0.5 MG/DL (ref 0.2–1)
BUN SERPL-MCNC: 22 MG/DL (ref 6–20)
BUN/CREAT SERPL: 20 (ref 12–20)
CALCIUM SERPL-MCNC: 9.5 MG/DL (ref 8.5–10.1)
CHLORIDE SERPL-SCNC: 114 MMOL/L (ref 97–108)
CHOLEST SERPL-MCNC: 179 MG/DL
CO2 SERPL-SCNC: 27 MMOL/L (ref 21–32)
CREAT SERPL-MCNC: 1.1 MG/DL (ref 0.7–1.3)
GLOBULIN SER CALC-MCNC: 3 G/DL (ref 2–4)
GLUCOSE SERPL-MCNC: 89 MG/DL (ref 65–100)
HDLC SERPL-MCNC: 42 MG/DL
HDLC SERPL: 4.3 {RATIO} (ref 0–5)
LDLC SERPL CALC-MCNC: 119.4 MG/DL (ref 0–100)
LIPID PROFILE,FLP: ABNORMAL
POTASSIUM SERPL-SCNC: 4.5 MMOL/L (ref 3.5–5.1)
PROT SERPL-MCNC: 7 G/DL (ref 6.4–8.2)
SODIUM SERPL-SCNC: 144 MMOL/L (ref 136–145)
TRIGL SERPL-MCNC: 88 MG/DL (ref ?–150)
TSH SERPL DL<=0.05 MIU/L-ACNC: 1.2 UIU/ML (ref 0.36–3.74)
VLDLC SERPL CALC-MCNC: 17.6 MG/DL

## 2020-02-11 RX ORDER — DIAZEPAM 5 MG/1
5 TABLET ORAL
Qty: 30 TAB | Refills: 0 | Status: SHIPPED | OUTPATIENT
Start: 2020-02-11 | End: 2020-03-31

## 2020-02-11 RX ORDER — KETOROLAC TROMETHAMINE 30 MG/ML
30 INJECTION, SOLUTION INTRAMUSCULAR; INTRAVENOUS ONCE
Qty: 1 VIAL | Refills: 0
Start: 2020-02-11 | End: 2020-02-11

## 2020-02-11 RX ORDER — PREDNISONE 10 MG/1
TABLET ORAL
Qty: 21 TAB | Refills: 0 | Status: SHIPPED | OUTPATIENT
Start: 2020-02-11 | End: 2020-05-08 | Stop reason: ALTCHOICE

## 2020-02-11 RX ORDER — DULOXETIN HYDROCHLORIDE 30 MG/1
30 CAPSULE, DELAYED RELEASE ORAL DAILY
Qty: 30 CAP | Refills: 2 | Status: SHIPPED | OUTPATIENT
Start: 2020-02-11 | End: 2020-03-24

## 2020-02-11 NOTE — PROGRESS NOTES
1. Have you been to the ER, urgent care clinic since your last visit? Hospitalized since your last visit? No    2. Have you seen or consulted any other health care providers outside of the [address] [place] [address] since your last visit? Include any pap smears or colon screening.  No  Reviewed record in preparation for visit and have necessary documentation  Pt did not bring medication to office visit for review  opportunity was given for questions  Goals that were addressed and/or need to be completed during or after this appointment include    Health Maintenance Due   Topic Date Due    DTaP/Tdap/Td series (1 - Tdap) 12/15/1964    FOBT Q1Y Age 54-65  11/17/2017    GLAUCOMA SCREENING Q2Y  12/15/2018    AAA Screening 73-69 YO Male Smoking Patients  12/15/2018    Pneumococcal 65+ years (1 of 1 - PPSV23) 12/15/2018    Influenza Age 9 to Adult  08/01/2019    Medicare Yearly Exam  03/04/2020

## 2020-02-16 NOTE — PROGRESS NOTES
Chief Complaint   Patient presents with    Hypertension    Anxiety    Back Pain     he is a 75y.o. year old male who presents for 6 month follow up of his chronic health conditions. Patient with hx of insomnia, depression, HLD and chronic LBP. He is fasting for lab work. He complains of worsening of his chronic LBP. Patient denies HA, dizziness, SOB, CP, abdominal pain, dysuria, acute myalgias or arthralgias. BP Readings from Last 3 Encounters:   02/11/20 143/86   09/18/19 155/74   09/10/19 (!) 170/97     Wt Readings from Last 3 Encounters:   02/11/20 160 lb (72.6 kg)   09/18/19 159 lb (72.1 kg)   09/10/19 159 lb (72.1 kg)     Body mass index is 21.7 kg/m². Hyperlipidemia    Cardiovascular risks for him are: LDL goal is under 100, hyperlipidemia, smoker.    Currently he takes Pravachol (pravastatin)   Our goal is to lower hyperlipidemia to decrease the risks of strokes and heart attacks      Patient Active Problem List   Diagnosis Code    Hyperlipemia E78.5    Insomnia G47.00    History of left hip replacement Z96.642    Depression F32.9    Tobacco abuse Z72.0    Conductive hearing loss, bilateral H90.0     Past Surgical History:   Procedure Laterality Date    COLONOSCOPY N/A 8/23/2016    COLON[facility]COPY performed by Tu Castellano MD at [facility] ENDOSCOPY    COLONOSCOPY N/A 9/18/2019    COLONOSCOPY performed by Summer Lynch MD at [address] HX COLONOSCOPY      HX GI      surgery for hemorrhoids    HX HEENT      all teeth removed    HX ORTHOPAEDIC      left hip replacement    HX ORTHOPAEDIC      infection after sutured finger/then healed/then had a growth removed 2 1/2 yrs later - benign    HX OTHER SURGICAL      growth on left side of testicle removed - benign     Social History     Socioeconomic History    Marital status: SINGLE     Spouse name: Not on file    Number of children: Not on file    Years of education: Not on file    Highest education level: Not on file   Occupational History  Not on file   Social Needs    Financial resource strain: Not on file    Food insecurity:     Worry: Not on file     Inability: Not on file    Transportation needs:     Medical: Not on file     Non-medical: Not on file   Tobacco Use    Smoking status: Former Smoker     Packs/day: 0.50     Years: 45.00     Pack years: 22.50    Smokeless tobacco: Never Used   Substance and Sexual Activity    Alcohol use: No    Drug use: No    Sexual activity: Never   Lifestyle    Physical activity:     Days per week: Not on file     Minutes per session: Not on file    Stress: Not on file   Relationships    Social connections:     Talks on phone: Not on file     Gets together: Not on file     Attends Buddhism service: Not on file     Active member of club or organization: Not on file     Attends meetings of clubs or organizations: Not on file     Relationship status: Not on file    Intimate partner violence:     Fear of current or ex partner: Not on file     Emotionally abused: Not on file     Physically abused: Not on file     Forced sexual activity: Not on file   Other Topics Concern    Not on file   Social History Narrative    Not on file     Family History   Problem Relation Age of Onset    Cancer Mother         breast    Stroke Mother         x2    Cancer Father         throat/lung    Heart Disease Father     Lung Disease Father     Thyroid Disease Father     Heart Attack Father     Suicide Brother     Psychiatric Disorder Brother     Heart Attack Paternal Uncle     Heart Attack Paternal Uncle         x3     Current Outpatient Medications   Medication Sig    DULoxetine (CYMBALTA) 30 mg capsule Take 1 Cap by mouth daily.  predniSONE (STERAPRED DS) 10 mg dose pack See administration instruction per 10mg dose pack    diazePAM (VALIUM) 5 mg tablet Take 1 Tab by mouth every twelve (12) hours as needed for Anxiety.  Max Daily Amount: 10 mg.    tamsulosin (FLOMAX) 0.4 mg capsule TAKE ONE CAPSULE BY MOUTH EVERY DAY    buPROPion XL (WELLBUTRIN XL) 150 mg tablet TAKE ONE TABLET BY MOUTH EVERY MORNING    pravastatin (PRAVACHOL) 40 mg tablet TAKE ONE TABLET BY MOUTH EVERY NIGHT    omeprazole (PRILOSEC) 20 mg capsule Take 1 Cap by mouth daily.  diclofenac EC (VOLTAREN) 75 mg EC tablet TAKE ONE TABLET BY MOUTH TWICE DAILY    traZODone (DESYREL) 150 mg tablet TAKE ONE TABLET BY MOUTH nightly    tiZANidine (ZANAFLEX) 4 mg tablet TAKE ONE TABLET BY MOUTH TWICE DAILY    montelukast (SINGULAIR) 10 mg tablet TAKE ONE TABLET BY MOUTH EVERY DAY    VENTOLIN HFA 90 mcg/actuation inhaler inhale 2 PUFFS by mouth EVERY 6 HOURS AS NEEDED FOR WHEEZING     No current facility-administered medications for this visit. Allergies   Allergen Reactions    Percocet [Oxycodone-Acetaminophen] Rash and Itching     itching       Review of Systems:  Constitutional: Negative for fatigue, malaise  Derm: Negative for rash or lesion  HEENT: Negative for acute hearing changes  Resp: Negative for cough, wheezing or SOB  CV: Negative for chest pain, dizziness or palpitations  Gastrointestinal: see HPI, Negative for nausea or abdominal pain  Genital/urinary: Negative for dysuria or voiding dysfunction  MS: hx of LBP  Neuro: Negative for HA, weakness or paresthesia  Psych: History of depression, insomnia     Vitals:    02/11/20 0833   BP: 143/86   Pulse: 72   Resp: 18   Temp: 99 °F (37.2 °C)   TempSrc: Oral   SpO2: 99%   Weight: 160 lb (72.6 kg)   Height: 6' (1.829 m)       Physical Examination:  General: thin, in no acute distress  Head: Normocephalic, atraumatic  Eyes: Sclera clear, EOMI  Neck: Normal range of motion  Respiratory: CTAB with symmetrical, unlabored effort  Cardiovascular: Normal S1, S2, Regular rate and rhythm, no carotid bruit  Extremities: FROM, antalgic gait,  Neurologic: Normal strength and sensation, no focal deficits  Psych: affect appropriate    Diagnoses and all orders for this visit:    1.  Hyperlipidemia, unspecified hyperlipidemia type  -     LIPID PANEL; Future  -     METABOLIC PANEL, COMPREHENSIVE; Future  -     TSH 3RD GENERATION; Future    2. DDD (degenerative disc disease), lumbar  -     ketorolac (TORADOL) 30 mg/mL (1 mL) injection; 1 mL by IntraMUSCular route once for 1 dose. Indications: excessive pain  -     KETOROLAC TROMETHAMINE INJ  -     NC THER/PROPH/DIAG INJECTION, SUBCUT/IM  -     DULoxetine (CYMBALTA) 30 mg capsule; Take 1 Cap by mouth daily. -     predniSONE (STERAPRED DS) 10 mg dose pack; See administration instruction per 10mg dose pack  -     diazePAM (VALIUM) 5 mg tablet; Take 1 Tab by mouth every twelve (12) hours as needed for Anxiety. Max Daily Amount: 10 mg.    3. Recurrent major depressive disorder, in partial remission (HCC)  -     DULoxetine (CYMBALTA) 30 mg capsule; Take 1 Cap by mouth daily. 4. Anxiety  -     diazePAM (VALIUM) 5 mg tablet; Take 1 Tab by mouth every twelve (12) hours as needed for Anxiety. Max Daily Amount: 10 mg.    5. Tobacco dependence      Plan of care:  Diagnoses were discussed in detail with patient. Medication risks/benefits/side effects discussed with patient. Strongly advised patient to stop all use of tobacco products. All of the patient's questions were addressed and answered to apparent satisfaction. The patient understands and agrees with our plan of care. The patient knows to call back if they have questions about the plan of care or if symptoms change. The patient received an After-Visit Summary which contains VS, diagnoses, orders, allergy and medication lists. Over half of the 40 minutes face to face with Scharlene Shone consisted of counseling and discussing treatment plans in reference to his chronic pain and multiple co-morbidities.       Future Appointments   Date Time Provider Marzena Chopra   5/8/2020  8:20 AM Jourdan Doss MD [facility] SCHED           Follow-up and Dispositions    · Return in about 3 months (around 5/11/2020), or if symptoms worsen or fail to improve.

## 2020-02-25 DIAGNOSIS — M51.36 DDD (DEGENERATIVE DISC DISEASE), LUMBAR: ICD-10-CM

## 2020-02-25 DIAGNOSIS — M94.0 COSTOCHONDRITIS, ACUTE: ICD-10-CM

## 2020-02-25 DIAGNOSIS — F41.9 ANXIETY: ICD-10-CM

## 2020-02-25 DIAGNOSIS — K21.9 GASTROESOPHAGEAL REFLUX DISEASE, ESOPHAGITIS PRESENCE NOT SPECIFIED: ICD-10-CM

## 2020-02-25 RX ORDER — TRAZODONE HYDROCHLORIDE 150 MG/1
TABLET ORAL
Qty: 30 TAB | Refills: 2 | OUTPATIENT
Start: 2020-02-25

## 2020-02-25 RX ORDER — MONTELUKAST SODIUM 10 MG/1
TABLET ORAL
Qty: 90 TAB | Refills: 0 | Status: SHIPPED | OUTPATIENT
Start: 2020-02-25 | End: 2020-04-29

## 2020-02-25 RX ORDER — DICLOFENAC SODIUM 75 MG/1
TABLET, DELAYED RELEASE ORAL
Qty: 60 TAB | Refills: 2 | Status: SHIPPED | OUTPATIENT
Start: 2020-02-25 | End: 2020-05-29

## 2020-02-25 RX ORDER — OMEPRAZOLE 20 MG/1
CAPSULE, DELAYED RELEASE ORAL
Qty: 30 CAP | Refills: 2 | Status: SHIPPED | OUTPATIENT
Start: 2020-02-25 | End: 2020-05-29

## 2020-02-25 RX ORDER — DIAZEPAM 5 MG/1
TABLET ORAL
Qty: 30 TAB | Refills: 0 | OUTPATIENT
Start: 2020-02-25

## 2020-03-04 DIAGNOSIS — M48.061 SPINAL STENOSIS OF LUMBAR REGION, UNSPECIFIED WHETHER NEUROGENIC CLAUDICATION PRESENT: ICD-10-CM

## 2020-03-04 DIAGNOSIS — M51.36 DDD (DEGENERATIVE DISC DISEASE), LUMBAR: ICD-10-CM

## 2020-03-04 RX ORDER — TIZANIDINE 4 MG/1
TABLET ORAL
Qty: 180 TAB | Refills: 0 | Status: SHIPPED | OUTPATIENT
Start: 2020-03-04 | End: 2020-05-29

## 2020-05-08 ENCOUNTER — VIRTUAL VISIT (OUTPATIENT)
Dept: FAMILY MEDICINE CLINIC | Age: 65
End: 2020-05-08

## 2020-05-08 DIAGNOSIS — F41.9 ANXIETY: ICD-10-CM

## 2020-05-08 DIAGNOSIS — M48.061 SPINAL STENOSIS OF LUMBAR REGION, UNSPECIFIED WHETHER NEUROGENIC CLAUDICATION PRESENT: ICD-10-CM

## 2020-05-08 DIAGNOSIS — N40.1 BENIGN PROSTATIC HYPERPLASIA WITH NOCTURIA: Primary | ICD-10-CM

## 2020-05-08 DIAGNOSIS — R35.1 BENIGN PROSTATIC HYPERPLASIA WITH NOCTURIA: Primary | ICD-10-CM

## 2020-05-08 RX ORDER — TAMSULOSIN HYDROCHLORIDE 0.4 MG/1
0.4 CAPSULE ORAL DAILY
Qty: 90 CAP | Refills: 0 | Status: SHIPPED | OUTPATIENT
Start: 2020-05-08 | End: 2020-08-05 | Stop reason: SDUPTHER

## 2020-05-08 NOTE — LETTER
5/8/2020 11:40 AM 
 
Mr. Abdulaziz Hull 
[address] [facility] [address] Your doctor is interested in not only helping you feel better when you are sick, but also in keeping you from getting sick in the first place. In the spirit of maintaining your good health, we look forward to seeing you. APPOINTMENT REMINDER Wednesday, August 05, 2020 10:00 AM with Dr. Gina Dozier Sincerely, [address]ie [address] 
[phone]

## 2020-05-08 NOTE — PROGRESS NOTES
Kimberly Ny is a 75 y.o. male evaluated via telephone on 20. Patient Identity confirmed by . Telephone encounter done in lieu of office visit due to extraordinary circumstances. A state of national and state emergency has been declared by the President and the [employer] due to the [employer] pandemic. Pursuant to the emergency declaration under the [facility], [facility] waiver authority and the [employer] Act, this Virtual  Visit was conducted, with patient's consent, to reduce the patient's risk of exposure to COVID-19 and provide continuity of care for an established patient. Jose Barrera LPN coordinated virtual visit    Consent:  Patient and/or health care decision maker is aware that that he may receive a bill for this telephone encounter, depending on his insurance coverage, and has provided verbal consent to proceed: Yes    Physician Location: Office  Patient Location: Home    CC: Follow up  Information gathered from patient and/or health care decision maker. HPI: Kimberly Ny is a 75 y.o. male who was evaluated by synchronous (real-time) audio technology from her home. Patient with hx of insomnia, depression, HLD and chronic LBP. Patient denies HA, dizziness, SOB, CP, abdominal pain, dysuria, acute myalgias or arthralgias.      Lab Results   Component Value Date/Time    Cholesterol, total 179 2020 12:16 PM    HDL Cholesterol 42 2020 12:16 PM    LDL, calculated 119.4 (H) 2020 12:16 PM    Triglyceride 88 2020 12:16 PM    CHOL/HDL Ratio 4.3 2020 12:16 PM     Lab Results   Component Value Date/Time    Prostate Specific Ag 2.7 07/15/2019 09:14 AM    Prostate Specific Ag 3.1 2019 10:00 AM    Prostate Specific Ag 3.9 2018 09:42 AM     Lab Results   Component Value Date/Time    Sodium 144 2020 12:16 PM    Potassium 4.5 2020 12:16 PM    Chloride 114 (H) 02/11/2020 12:16 PM    CO2 27 02/11/2020 12:16 PM    Anion gap 3 (L) 02/11/2020 12:16 PM    Glucose 89 02/11/2020 12:16 PM    BUN 22 (H) 02/11/2020 12:16 PM    Creatinine 1.10 02/11/2020 12:16 PM    BUN/Creatinine ratio 20 02/11/2020 12:16 PM    GFR est AA >60 02/11/2020 12:16 PM    GFR est non-AA >60 02/11/2020 12:16 PM    Calcium 9.5 02/11/2020 12:16 PM    Bilirubin, total 0.5 02/11/2020 12:16 PM    ALT (SGPT) 26 02/11/2020 12:16 PM    AST (SGOT) 14 (L) 02/11/2020 12:16 PM    Alk. phosphatase 83 02/11/2020 12:16 PM    Protein, total 7.0 02/11/2020 12:16 PM    Albumin 4.0 02/11/2020 12:16 PM    Globulin 3.0 02/11/2020 12:16 PM    A-G Ratio 1.3 02/11/2020 12:16 PM            Current Outpatient Medications:     tamsulosin (FLOMAX) 0.4 mg capsule, Take 1 Cap by mouth daily. , Disp: 90 Cap, Rfl: 0    traZODone (DESYREL) 150 mg tablet, TAKE ONE TABLET BY MOUTH nightly, Disp: 90 Tab, Rfl: 0    montelukast (SINGULAIR) 10 mg tablet, TAKE ONE TABLET BY MOUTH EVERY DAY, Disp: 90 Tab, Rfl: 0    diazePAM (VALIUM) 5 mg tablet, TAKE ONE TABLET BY MOUTH EVERY TWELVE HOURS AS NEEDED FOR ANXIETY.  max daiy AMOUNT: 10 MG, Disp: 30 Tab, Rfl: 0    DULoxetine (CYMBALTA) 30 mg capsule, TAKE ONE CAPSULE BY MOUTH ONCE A DAY, Disp: 90 Cap, Rfl: 0    tiZANidine (ZANAFLEX) 4 mg tablet, TAKE ONE TABLET BY MOUTH TWICE DAILY, Disp: 180 Tab, Rfl: 0    omeprazole (PRILOSEC) 20 mg capsule, TAKE ONE CAPSULE BY MOUTH ONCE A DAY, Disp: 30 Cap, Rfl: 2    diclofenac EC (VOLTAREN) 75 mg EC tablet, TAKE ONE TABLET BY MOUTH TWICE DAILY, Disp: 60 Tab, Rfl: 2    buPROPion XL (WELLBUTRIN XL) 150 mg tablet, TAKE ONE TABLET BY MOUTH EVERY MORNING, Disp: 30 Tab, Rfl: 3    pravastatin (PRAVACHOL) 40 mg tablet, TAKE ONE TABLET BY MOUTH EVERY NIGHT, Disp: 30 Tab, Rfl: 3    VENTOLIN HFA 90 mcg/actuation inhaler, inhale 2 PUFFS by mouth EVERY 6 HOURS AS NEEDED FOR WHEEZING, Disp: 18 g, Rfl: 2     Allergies   Allergen Reactions    Percocet [Oxycodone-Acetaminophen] Rash and Itching     itching        Patient Active Problem List    Diagnosis Date Noted    Conductive hearing loss, bilateral 07/25/2018    Depression 10/31/2015    Tobacco abuse 10/31/2015    History of left hip replacement 08/06/2015    Hyperlipemia 01/28/2015    Insomnia 01/28/2015        Review of Systems:  Constitutional: Negative for fatigue, malaise  Resp: Negative for cough, wheezing or SOB  CV: Negative for chest pain, dizziness or palpitations  GI: Negative for nausea or abdominal pain  MS: see HPI, Negative for acute myalgias or arthralgias   Neuro: Negative for HA, weakness or paresthesia  Psych: Negative for depression or anxiety       Assessment/Plan:  Details of this discussion including any medical advice provided:       ICD-10-CM ICD-9-CM    1. Benign prostatic hyperplasia with nocturia N40.1 600.01 tamsulosin (FLOMAX) 0.4 mg capsule    R35.1 788.43    2. Spinal stenosis of lumbar region, unspecified whether neurogenic claudication present M48.061 724.02    3. Anxiety F41.9 300.00        Symptomatic therapy suggested: call prn if symptoms persist or worsen. Total Time: minutes: 5-10 minutes was spent on phone discussing above problems and plan. Patient medical history, prior OV notes, vitals flow sheet, lab results, medications, and allergies were reviewed during this encounter. For phone encounters:  I affirm this is a patient initiated episode with an established Patient who has not had a related appointment within my department in the past 7 days or scheduled within the next 24 hours.     Note: not billable if this call serves to triage the patient into an appointment for the relevant concern    MD RANDY Owens [facility]  05/08/20

## 2020-08-05 ENCOUNTER — OFFICE VISIT (OUTPATIENT)
Dept: FAMILY MEDICINE CLINIC | Age: 65
End: 2020-08-05
Payer: MEDICARE

## 2020-08-05 ENCOUNTER — HOSPITAL ENCOUNTER (OUTPATIENT)
Dept: LAB | Age: 65
Discharge: HOME | End: 2020-08-05

## 2020-08-05 VITALS
SYSTOLIC BLOOD PRESSURE: 155 MMHG | HEART RATE: 76 BPM | TEMPERATURE: 98.6 F | WEIGHT: 170.6 LBS | RESPIRATION RATE: 20 BRPM | HEIGHT: 72 IN | OXYGEN SATURATION: 97 % | DIASTOLIC BLOOD PRESSURE: 82 MMHG | BODY MASS INDEX: 23.11 KG/M2

## 2020-08-05 DIAGNOSIS — R35.1 BENIGN PROSTATIC HYPERPLASIA WITH NOCTURIA: ICD-10-CM

## 2020-08-05 DIAGNOSIS — Z13.39 SCREENING FOR ALCOHOLISM: ICD-10-CM

## 2020-08-05 DIAGNOSIS — N40.1 BENIGN PROSTATIC HYPERPLASIA WITH NOCTURIA: ICD-10-CM

## 2020-08-05 DIAGNOSIS — E78.5 HYPERLIPIDEMIA, UNSPECIFIED HYPERLIPIDEMIA TYPE: Primary | ICD-10-CM

## 2020-08-05 DIAGNOSIS — Z00.00 MEDICARE ANNUAL WELLNESS VISIT, SUBSEQUENT: ICD-10-CM

## 2020-08-05 DIAGNOSIS — F33.41 RECURRENT MAJOR DEPRESSIVE DISORDER, IN PARTIAL REMISSION (HCC): ICD-10-CM

## 2020-08-05 DIAGNOSIS — M51.36 DDD (DEGENERATIVE DISC DISEASE), LUMBAR: ICD-10-CM

## 2020-08-05 DIAGNOSIS — K21.9 GASTROESOPHAGEAL REFLUX DISEASE, ESOPHAGITIS PRESENCE NOT SPECIFIED: ICD-10-CM

## 2020-08-05 DIAGNOSIS — Z13.31 SCREENING FOR DEPRESSION: ICD-10-CM

## 2020-08-05 DIAGNOSIS — E78.5 HYPERLIPIDEMIA, UNSPECIFIED HYPERLIPIDEMIA TYPE: ICD-10-CM

## 2020-08-05 LAB
ALBUMIN SERPL-MCNC: 4 G/DL (ref 3.5–5)
ALBUMIN/GLOB SERPL: 1.3 {RATIO} (ref 1.1–2.2)
ALP SERPL-CCNC: 92 U/L (ref 45–117)
ALT SERPL-CCNC: 34 U/L (ref 12–78)
ANION GAP SERPL CALC-SCNC: 3 MMOL/L (ref 5–15)
AST SERPL-CCNC: 21 U/L (ref 15–37)
BILIRUB SERPL-MCNC: 0.6 MG/DL (ref 0.2–1)
BUN SERPL-MCNC: 17 MG/DL (ref 6–20)
BUN/CREAT SERPL: 16 (ref 12–20)
CALCIUM SERPL-MCNC: 9.1 MG/DL (ref 8.5–10.1)
CHLORIDE SERPL-SCNC: 111 MMOL/L (ref 97–108)
CHOLEST SERPL-MCNC: 190 MG/DL
CO2 SERPL-SCNC: 27 MMOL/L (ref 21–32)
CREAT SERPL-MCNC: 1.08 MG/DL (ref 0.7–1.3)
ERYTHROCYTE [DISTWIDTH] IN BLOOD BY AUTOMATED COUNT: 14.2 % (ref 11.5–14.5)
GLOBULIN SER CALC-MCNC: 3.2 G/DL (ref 2–4)
GLUCOSE SERPL-MCNC: 86 MG/DL (ref 65–100)
HCT VFR BLD AUTO: 46.9 % (ref 36.6–50.3)
HDLC SERPL-MCNC: 44 MG/DL
HDLC SERPL: 4.3 {RATIO} (ref 0–5)
HGB BLD-MCNC: 14.8 G/DL (ref 12.1–17)
LDLC SERPL CALC-MCNC: 124.2 MG/DL (ref 0–100)
LIPID PROFILE,FLP: ABNORMAL
MCH RBC QN AUTO: 29.4 PG (ref 26–34)
MCHC RBC AUTO-ENTMCNC: 31.6 G/DL (ref 30–36.5)
MCV RBC AUTO: 93.1 FL (ref 80–99)
NRBC # BLD: 0 K/UL (ref 0–0.01)
NRBC BLD-RTO: 0 PER 100 WBC
PLATELET # BLD AUTO: 273 K/UL (ref 150–400)
PMV BLD AUTO: 10.5 FL (ref 8.9–12.9)
POTASSIUM SERPL-SCNC: 5.3 MMOL/L (ref 3.5–5.1)
PROT SERPL-MCNC: 7.2 G/DL (ref 6.4–8.2)
PSA SERPL-MCNC: 2.3 NG/ML (ref 0.01–4)
RBC # BLD AUTO: 5.04 M/UL (ref 4.1–5.7)
SODIUM SERPL-SCNC: 141 MMOL/L (ref 136–145)
TRIGL SERPL-MCNC: 109 MG/DL (ref ?–150)
TSH SERPL DL<=0.05 MIU/L-ACNC: 1.79 UIU/ML (ref 0.36–3.74)
VLDLC SERPL CALC-MCNC: 21.8 MG/DL
WBC # BLD AUTO: 9.6 K/UL (ref 4.1–11.1)

## 2020-08-05 PROCEDURE — 1101F PT FALLS ASSESS-DOCD LE1/YR: CPT | Performed by: FAMILY MEDICINE

## 2020-08-05 PROCEDURE — G8420 CALC BMI NORM PARAMETERS: HCPCS | Performed by: FAMILY MEDICINE

## 2020-08-05 PROCEDURE — G0439 PPPS, SUBSEQ VISIT: HCPCS | Performed by: FAMILY MEDICINE

## 2020-08-05 PROCEDURE — G9717 DOC PT DX DEP/BP F/U NT REQ: HCPCS | Performed by: FAMILY MEDICINE

## 2020-08-05 PROCEDURE — G8536 NO DOC ELDER MAL SCRN: HCPCS | Performed by: FAMILY MEDICINE

## 2020-08-05 PROCEDURE — 99214 OFFICE O/P EST MOD 30 MIN: CPT | Performed by: FAMILY MEDICINE

## 2020-08-05 PROCEDURE — G8427 DOCREV CUR MEDS BY ELIG CLIN: HCPCS | Performed by: FAMILY MEDICINE

## 2020-08-05 PROCEDURE — 3017F COLORECTAL CA SCREEN DOC REV: CPT | Performed by: FAMILY MEDICINE

## 2020-08-05 RX ORDER — DULOXETIN HYDROCHLORIDE 30 MG/1
CAPSULE, DELAYED RELEASE ORAL
Qty: 90 CAP | Refills: 1 | Status: SHIPPED | OUTPATIENT
Start: 2020-08-05 | End: 2021-03-15 | Stop reason: SDUPTHER

## 2020-08-05 RX ORDER — TAMSULOSIN HYDROCHLORIDE 0.4 MG/1
0.4 CAPSULE ORAL DAILY
Qty: 90 CAP | Refills: 1 | Status: SHIPPED | OUTPATIENT
Start: 2020-08-05 | End: 2021-02-05 | Stop reason: SDUPTHER

## 2020-08-05 NOTE — PROGRESS NOTES
1. Have you been to the ER, urgent care clinic since your last visit? Hospitalized since your last visit? No    2. Have you seen or consulted any other health care providers outside of the [address] [place] [address] since your last visit? Include any pap smears or colon screening. No    Reviewed record in preparation for visit and have necessary documentation  Goals that were addressed and/or need to be completed during or after this appointment include     Health Maintenance Due   Topic Date Due    FOBT Q1Y Age 54-65  11/17/2017    Pneumococcal 65+ years (1 of 1 - PPSV23) 12/15/2018    Medicare Yearly Exam  03/04/2020    Influenza Age 9 to Adult  08/01/2020       Patient is accompanied by self I have received verbal consent from Tangela Rodriguez to discuss any/all medical information while they are present in the room.

## 2020-08-05 NOTE — PROGRESS NOTES
Chief Complaint   Patient presents with    Follow Up Chronic Condition    Labs     he is a 75y.o. year old male who presents for 6 month follow up of his chronic health conditions. Patient with hx of insomnia, depression, HLD and chronic LBP. He is fasting for lab work. Patient denies HA, dizziness, SOB, CP, abdominal pain, dysuria, acute myalgias or arthralgias. BP Readings from Last 3 Encounters:   08/05/20 155/82   02/11/20 143/86   09/18/19 155/74     Wt Readings from Last 3 Encounters:   08/05/20 170 lb 9.6 oz (77.4 kg)   02/11/20 160 lb (72.6 kg)   09/18/19 159 lb (72.1 kg)     Body mass index is 23.14 kg/m². Hyperlipidemia    Cardiovascular risks for him are: LDL goal is under 100, hyperlipidemia, smoker. Currently he takes Pravachol (pravastatin)     Lab Results   Component Value Date/Time    Cholesterol, total 190 08/05/2020 11:20 AM    HDL Cholesterol 44 08/05/2020 11:20 AM    LDL, calculated 124.2 (H) 08/05/2020 11:20 AM    Triglyceride 109 08/05/2020 11:20 AM    CHOL/HDL Ratio 4.3 08/05/2020 11:20 AM     Lab Results   Component Value Date/Time    Sodium 141 08/05/2020 11:20 AM    Potassium 5.3 (H) 08/05/2020 11:20 AM    Chloride 111 (H) 08/05/2020 11:20 AM    CO2 27 08/05/2020 11:20 AM    Anion gap 3 (L) 08/05/2020 11:20 AM    Glucose 86 08/05/2020 11:20 AM    BUN 17 08/05/2020 11:20 AM    Creatinine 1.08 08/05/2020 11:20 AM    BUN/Creatinine ratio 16 08/05/2020 11:20 AM    GFR est AA >60 08/05/2020 11:20 AM    GFR est non-AA >60 08/05/2020 11:20 AM    Calcium 9.1 08/05/2020 11:20 AM    Bilirubin, total 0.6 08/05/2020 11:20 AM    ALT (SGPT) 34 08/05/2020 11:20 AM    Alk.  phosphatase 92 08/05/2020 11:20 AM    Protein, total 7.2 08/05/2020 11:20 AM    Albumin 4.0 08/05/2020 11:20 AM    Globulin 3.2 08/05/2020 11:20 AM    A-G Ratio 1.3 08/05/2020 11:20 AM      Our goal is to lower hyperlipidemia to decrease the risks of strokes and heart attacks      Patient Active Problem List   Diagnosis Code  Hyperlipemia E78.5    Insomnia G47.00    History of left hip replacement Z96.642    Depression F32.9    Tobacco abuse Z72.0    Conductive hearing loss, bilateral H90.0     Past Surgical History:   Procedure Laterality Date    COLONOSCOPY N/A 8/23/2016    [facility]COPY    COLONOSCOPY N/A 9/18/2019    COLONOSCOPY performed by Shakira Jerome MD at [address] HX COLONOSCOPY      HX GI      surgery for hemorrhoids    HX HEENT      all teeth removed    HX ORTHOPAEDIC      left hip replacement    HX ORTHOPAEDIC      infection after sutured finger/then healed/then had a growth removed 2 1/2 yrs later - benign    HX OTHER SURGICAL      growth on left side of testicle removed - benign     Social History     Socioeconomic History    Marital status: SINGLE     Spouse name: Not on file    Number of children: Not on file    Years of education: Not on file    Highest education level: Not on file   Occupational History    Not on file   Social Needs    Financial resource strain: Not on file    Food insecurity     Worry: Not on file     Inability: Not on file    Transportation needs     Medical: Not on file     Non-medical: Not on file   Tobacco Use    Smoking status: Former Smoker     Packs/day: 0.50     Years: 45.00     Pack years: 22.50    Smokeless tobacco: Never Used   Substance and Sexual Activity    Alcohol use: No    Drug use: No    Sexual activity: Never   Lifestyle    Physical activity     Days per week: Not on file     Minutes per session: Not on file    Stress: Not on file   Relationships    Social connections     Talks on phone: Not on file     Gets together: Not on file     Attends Sabianist service: Not on file     Active member of club or organization: Not on file     Attends meetings of clubs or organizations: Not on file     Relationship status: Not on file    Intimate partner violence     Fear of current or ex partner: Not on file     Emotionally abused: Not on file     Physically abused: Not on file     Forced sexual activity: Not on file   Other Topics Concern    Not on file   Social History Narrative    Not on file     Family History   Problem Relation Age of Onset    Cancer Mother         breast    Stroke Mother         x2    Cancer Father         throat/lung    Heart Disease Father     Lung Disease Father     Thyroid Disease Father     Heart Attack Father     Suicide Brother     Psychiatric Disorder Brother     Heart Attack Paternal Uncle     Heart Attack Paternal Uncle         x3     Current Outpatient Medications   Medication Sig    DULoxetine (CYMBALTA) 30 mg capsule TAKE ONE CAPSULE BY MOUTH ONCE A DAY    tamsulosin (FLOMAX) 0.4 mg capsule Take 1 Cap by mouth daily.  diazePAM (VALIUM) 5 mg tablet TAKE ONE TABLET BY MOUTH EVERY TWELVE HOURS AS NEEDED FOR ANXIETY. max daiy AMOUNT: 10 MG    Ventolin HFA 90 mcg/actuation inhaler inhale 2 PUFFS by mouth EVERY 6 HOURS AS NEEDED FOR WHEEZING    tiZANidine (ZANAFLEX) 4 mg tablet TAKE ONE TABLET BY MOUTH TWICE DAILY    diclofenac EC (VOLTAREN) 75 mg EC tablet TAKE ONE TABLET BY MOUTH TWICE DAILY    omeprazole (PRILOSEC) 20 mg capsule TAKE ONE CAPSULE BY MOUTH ONCE A DAY    pravastatin (PRAVACHOL) 40 mg tablet TAKE ONE TABLET BY MOUTH EVERY NIGHT    buPROPion XL (WELLBUTRIN XL) 150 mg tablet TAKE ONE TABLET BY MOUTH EVERY MORNING    traZODone (DESYREL) 150 mg tablet TAKE ONE TABLET BY MOUTH nightly    montelukast (SINGULAIR) 10 mg tablet TAKE ONE TABLET BY MOUTH EVERY DAY     No current facility-administered medications for this visit.       Allergies   Allergen Reactions    Percocet [Oxycodone-Acetaminophen] Rash and Itching     itching       Review of Systems:  Constitutional: Negative for fatigue, malaise  Derm: Negative for rash or lesion  HEENT: Negative for acute hearing changes  Resp: Negative for cough, wheezing or SOB  CV: Negative for chest pain, dizziness or palpitations  Gastrointestinal: see HPI, Negative for nausea or abdominal pain  Genital/urinary: Negative for dysuria or voiding dysfunction  MS: hx of LBP  Neuro: Negative for HA, weakness or paresthesia  Psych: History of depression, insomnia     Vitals:    08/05/20 1014   BP: 155/82   Pulse: 76   Resp: 20   Temp: 98.6 °F (37 °C)   TempSrc: Oral   SpO2: 97%   Weight: 170 lb 9.6 oz (77.4 kg)   Height: 6' (1.829 m)       Physical Examination:  General: thin, in no acute distress  Head: Normocephalic, atraumatic  Eyes: Sclera clear, EOMI  Neck: Normal range of motion  Respiratory: CTAB with symmetrical, unlabored effort  Cardiovascular: Normal S1, S2, Regular rate and rhythm  Extremities: FROM, antalgic gait  Neurologic: Normal strength and sensation, no focal deficits  Psych: affect appropriate    Diagnoses and all orders for this visit:    1. Hyperlipidemia, unspecified hyperlipidemia type  -     LIPID PANEL; Future  -     METABOLIC PANEL, COMPREHENSIVE; Future  -     TSH 3RD GENERATION; Future    2. Gastroesophageal reflux disease, esophagitis presence not specified  -     CBC W/O DIFF; Future    3. Benign prostatic hyperplasia with nocturia  -     PSA, DIAGNOSTIC (PROSTATE SPECIFIC AG); Future  -     tamsulosin (FLOMAX) 0.4 mg capsule; Take 1 Cap by mouth daily. 4. DDD (degenerative disc disease), lumbar  -     DULoxetine (CYMBALTA) 30 mg capsule; TAKE ONE CAPSULE BY MOUTH ONCE A DAY    5. Recurrent major depressive disorder, in partial remission (HCC)  -     DULoxetine (CYMBALTA) 30 mg capsule; TAKE ONE CAPSULE BY MOUTH ONCE A DAY      Plan of care:  Diagnoses were discussed in detail with patient. Medication risks/benefits/side effects discussed with patient. Strongly advised patient to stop all use of tobacco products. All of the patient's questions were addressed and answered to apparent satisfaction. The patient understands and agrees with our plan of care.   The patient knows to call back if they have questions about the plan of care or if symptoms change. The patient received an After-Visit Summary which contains VS, diagnoses, orders, allergy and medication lists. No future appointments. Follow-up and Dispositions    · Return in about 3 months (around 11/5/2020) for VV. The following Annual Medicare Wellness Exam is distinct and separate from the medical evaluation and decision making. This is the Subsequent Medicare Annual Wellness Exam, performed 12 months or more after the Initial AWV or the last Subsequent AWV    I have reviewed the patient's medical history in detail and updated the computerized patient record.      History     Patient Active Problem List   Diagnosis Code    Hyperlipemia E78.5    Insomnia G47.00    History of left hip replacement Z96.642    Depression F32.9    Tobacco abuse Z72.0    Conductive hearing loss, bilateral H90.0     Past Medical History:   Diagnosis Date    Adverse effect of anesthesia     \"major fear of needles\"/raw feeling down throat    Arthritis     osteo    Chronic obstructive pulmonary disease (HCC)     Chronic pain     lower back - stenosis - had injections/steroids    Hypercholesterolemia     Ill-defined condition     low BP but not a problem    Psychiatric disorder     depression      Past Surgical History:   Procedure Laterality Date    COLONOSCOPY N/A 8/23/2016    [facility] at [facility]OSCOPY    COLONOSCOPY N/A 9/18/2019    COLONOSCOPY performed by Shakira Jerome MD at [facility] ENDOSCOPY    HX COLONOSCOPY      HX GI      surgery for hemorrhoids    HX HEENT      all teeth removed    HX ORTHOPAEDIC      left hip replacement    HX ORTHOPAEDIC      infection after sutured finger/then healed/then had a growth removed 2 1/2 yrs later - benign    HX OTHER SURGICAL      growth on left side of testicle removed - benign     Current Outpatient Medications   Medication Sig Dispense Refill    DULoxetine (CYMBALTA) 30 mg capsule TAKE ONE CAPSULE BY MOUTH ONCE A DAY 90 Cap 1    tamsulosin (FLOMAX) 0.4 mg capsule Take 1 Cap by mouth daily. 90 Cap 1    diazePAM (VALIUM) 5 mg tablet TAKE ONE TABLET BY MOUTH EVERY TWELVE HOURS AS NEEDED FOR ANXIETY.  max daiy AMOUNT: 10 MG 30 Tab 0    Ventolin HFA 90 mcg/actuation inhaler inhale 2 PUFFS by mouth EVERY 6 HOURS AS NEEDED FOR WHEEZING 18 g 2    tiZANidine (ZANAFLEX) 4 mg tablet TAKE ONE TABLET BY MOUTH TWICE DAILY 180 Tab 0    diclofenac EC (VOLTAREN) 75 mg EC tablet TAKE ONE TABLET BY MOUTH TWICE DAILY 60 Tab 2    omeprazole (PRILOSEC) 20 mg capsule TAKE ONE CAPSULE BY MOUTH ONCE A DAY 30 Cap 2    pravastatin (PRAVACHOL) 40 mg tablet TAKE ONE TABLET BY MOUTH EVERY NIGHT 30 Tab 3    buPROPion XL (WELLBUTRIN XL) 150 mg tablet TAKE ONE TABLET BY MOUTH EVERY MORNING 30 Tab 3    traZODone (DESYREL) 150 mg tablet TAKE ONE TABLET BY MOUTH nightly 90 Tab 0    montelukast (SINGULAIR) 10 mg tablet TAKE ONE TABLET BY MOUTH EVERY DAY 90 Tab 0     Allergies   Allergen Reactions    Percocet [Oxycodone-Acetaminophen] Rash and Itching     itching       Family History   Problem Relation Age of Onset    Cancer Mother         breast    Stroke Mother         x2    Cancer Father         throat/lung    Heart Disease Father     Lung Disease Father     Thyroid Disease Father     Heart Attack Father     Suicide Brother     Psychiatric Disorder Brother     Heart Attack Paternal Uncle     Heart Attack Paternal Uncle         x3     Social History     Tobacco Use    Smoking status: Former Smoker     Packs/day: 0.50     Years: 45.00     Pack years: 22.50    Smokeless tobacco: Never Used   Substance Use Topics    Alcohol use: No       Depression Risk Factor Screening:     3 most recent PHQ Screens 5/8/2020   PHQ Not Done -   Little interest or pleasure in doing things Not at all   Feeling down, depressed, irritable, or hopeless Not at all   Total Score PHQ 2 0 Trouble falling or staying asleep, or sleeping too much -   Feeling tired or having little energy -   Poor appetite, weight loss, or overeating -   Feeling bad about yourself - or that you are a failure or have let yourself or your family down -   Trouble concentrating on things such as school, work, reading, or watching TV -   Moving or speaking so slowly that other people could have noticed; or the opposite being so fidgety that others notice -   Thoughts of being better off dead, or hurting yourself in some way -   PHQ 9 Score -   How difficult have these problems made it for you to do your work, take care of your home and get along with others -       Alcohol Risk Factor Screening (MALE > 65): Do you average more 1 drink per night or more than 7 drinks a week: No    In the past three months have you have had more than 4 drinks containing alcohol on one occasion: No      Functional Ability and Level of Safety:   Hearing: Hearing is adequate. Activities of Daily Living: The home contains: no safety equipment. Patient does total self care     Ambulation: with mild difficulty     Fall Risk:  Fall Risk Assessment, last 12 mths 2/11/2020   Able to walk? Yes   Fall in past 12 months? No     Abuse Screen:  Patient is not abused       Cognitive Screening   Has your family/caregiver stated any concerns about your memory: no       Patient Care Team   Patient Care Team:  Neill Skiff, MD as PCP - General (Family Medicine)  Neill Skiff, MD as PCP - [address] [place] [address]  [facility]  Aruna Aguila MD (Orthopedic Surgery)  Leeanna Mcdonough MD (Neurosurgery)  Gery Galeazzi, MD (Orthopedic Surgery)  Daphne Crawford MD (Urology)    Assessment/Plan   Education and counseling provided:  Are appropriate based on today's review and evaluation  End-of-Life planning (with patient's consent)    Diagnoses and all orders for this visit:    1. Medicare annual wellness visit, subsequent    2.  Screening for depression  - DEPRESSION SCREEN ANNUAL    3.  Screening for alcoholism  -     CT ANNUAL ALCOHOL SCREEN 15 MIN

## 2020-08-10 NOTE — PATIENT INSTRUCTIONS
Medicare Wellness Visit, Male The best way to live healthy is to have a lifestyle where you eat a well-balanced diet, exercise regularly, limit alcohol use, and quit all forms of tobacco/nicotine, if applicable. Regular preventive services are another way to keep healthy. Preventive services (vaccines, screening tests, monitoring & exams) can help personalize your care plan, which helps you manage your own care. Screening tests can find health problems at the earliest stages, when they are easiest to treat. [facility] follows the current, evidence-based guidelines published by the [facility] Kanchan ([facility]) when recommending preventive services for our patients. Because we follow these guidelines, sometimes recommendations change over time as research supports it. (For example, a prostate screening blood test is no longer routinely recommended for men with no symptoms). Of course, you and your doctor may decide to screen more often for some diseases, based on your risk and co-morbidities (chronic disease you are already diagnosed with). Preventive services for you include: - Medicare offers their members a free annual wellness visit, which is time for you and your primary care provider to discuss and plan for your preventive service needs. Take advantage of this benefit every year! 
-All adults over age 72 should receive the recommended pneumonia vaccines. Current USPSTF guidelines recommend a series of two vaccines for the best pneumonia protection.  
-All adults should have a flu vaccine yearly and tetanus vaccine every 10 years. 
-All adults age 48 and older should receive the shingles vaccines (series of two vaccines).       
-All adults age 38-68 who are overweight should have a diabetes screening test once every three years.  
-Other screening tests & preventive services for persons with diabetes include: an eye exam to screen for diabetic retinopathy, a kidney function test, a foot exam, and stricter control over your cholesterol.  
-Cardiovascular screening for adults with routine risk involves an electrocardiogram (ECG) at intervals determined by the provider.  
-Colorectal cancer screening should be done for adults age 54-65 with no increased risk factors for colorectal cancer. There are a number of acceptable methods of screening for this type of cancer. Each test has its own benefits and drawbacks. Discuss with your provider what is most appropriate for you during your annual wellness visit. The different tests include: colonoscopy (considered the best screening method), a fecal occult blood test, a fecal DNA test, and sigmoidoscopy. 
-All adults born between [facility] should be screened once for Hepatitis C. 
-An Abdominal Aortic Aneurysm (AAA) Screening is recommended for men age 73-68 who has ever smoked in their lifetime. Here is a list of your current Health Maintenance items (your personalized list of preventive services) with a due date: 
Health Maintenance Due Topic Date Due  
 Colon Cancer Stool Test  11/17/2017  Pneumococcal Vaccine (1 of 1 - PPSV23) 12/15/2018 [address] [place] [address] Annual Well Visit  03/04/2020  Flu Vaccine  08/01/2020

## 2020-09-28 ENCOUNTER — TELEPHONE (OUTPATIENT)
Dept: FAMILY MEDICINE CLINIC | Age: 65
End: 2020-09-28

## 2020-09-28 NOTE — TELEPHONE ENCOUNTER
There are recent laws regarding controlled medications in [place]. I cannot prescribe both a benzodiazepine and opioid. Can send in steroid dose pack if this is acceptable.

## 2020-09-28 NOTE — TELEPHONE ENCOUNTER
Returned phone call to patient. He says that he was prescribed Hydrocodone on 3/13/2017 by Nilam. He wants a refill on this medication. He is complaining of right shoulder pain. Advised patient that this is not typically something that we refill, especially when he hasn't been on it in so long. Patient insisted that this message get sent to Dr. Talat Warren. Also, he has had numbness in left hand two times since his last visit. Please advise.

## 2020-09-28 NOTE — TELEPHONE ENCOUNTER
Returned phone call to patient, no answer. If patient calls back, please inform him of the following per Dr. Mihaela Bowles: \"There are recent laws regarding controlled medications in [place]. I cannot prescribe both a benzodiazepine and opioid. Can send in steroid dose pack if this is acceptable. \"

## 2020-09-30 ENCOUNTER — VIRTUAL VISIT (OUTPATIENT)
Dept: FAMILY MEDICINE CLINIC | Age: 65
End: 2020-09-30
Payer: MEDICARE

## 2020-09-30 DIAGNOSIS — M25.511 ACUTE PAIN OF RIGHT SHOULDER: Primary | ICD-10-CM

## 2020-09-30 PROCEDURE — 99443 PR PHYS/QHP TELEPHONE EVALUATION 21-30 MIN: CPT | Performed by: FAMILY MEDICINE

## 2020-09-30 RX ORDER — PREDNISONE 20 MG/1
60 TABLET ORAL DAILY
Qty: 15 TAB | Refills: 0 | Status: SHIPPED | OUTPATIENT
Start: 2020-09-30 | End: 2021-03-15

## 2020-09-30 NOTE — PROGRESS NOTES
Chief Complaint   Patient presents with    Shoulder Pain     Discuss pain medication     1. Have you been to the ER, urgent care clinic since your last visit? Hospitalized since your last visit?no    2. Have you seen or consulted any other health care providers outside of the [address] [place] [address] since your last visit? Include any pap smears or colon screening.  no    Reviewed record in preparation for visit and have necessary documentation  Pt did not bring medication to office visit for review  Information was given to pt on Advanced Directives, Living Will  Information was given on Shingles Vaccine  Opportunity was given for questions  Goals that were addressed and/or need to be completed after this appointment include:   Health Maintenance Due   Topic Date Due    FOBT Q1Y Age 54-65  11/17/2017    Pneumococcal 65+ years (1 of 1 - PPSV23) 12/15/2018    Flu Vaccine (1) 09/01/2020

## 2020-09-30 NOTE — PROGRESS NOTES
Sada Swan is a 75 y.o. male evaluated via telephone on 20. Patient Identity confirmed by . Telephone encounter done in lieu of office visit due to extraordinary circumstances. A state of national and state emergency has been declared by the President and the [place] due to the [employer] pandemic. Pursuant to the emergency declaration under the [address] [id number] [facility] authority and the [employer] Act, this Virtual  Visit was conducted, with patient's consent, to reduce the patient's risk of exposure to COVID-19 and provide continuity of care for an established patient. Yanelis Tamayo LPN coordinated virtual visit    Consent:  Patient and/or health care decision maker is aware that he/she may receive a bill for this telephone encounter, depending on his insurance coverage, and has provided verbal consent to proceed: Yes    Physician Location: Office  Patient Location: Home    CC: right shoulder injury  Information gathered from patient and/or health care decision maker. HPI: Sada Swan is a 75 y.o. male who was evaluated by synchronous (real-time) audio technology from his/her home. Patient injured right shoulder trying to start lawnmower. Patient with hx of insomnia, depression, HLD and chronic LBP. Patient denies HA, dizziness, SOB, CP, abdominal pain, dysuria, acute myalgias or arthralgias. Encounter Diagnoses   Name Primary?  Acute pain of right shoulder Yes         Current Outpatient Medications:     buPROPion XL (WELLBUTRIN XL) 150 mg tablet, TAKE ONE TABLET BY MOUTH EVERY MORNING, Disp: 30 Tab, Rfl: 3    pravastatin (PRAVACHOL) 40 mg tablet, TAKE ONE TABLET BY MOUTH EVERY NIGHT, Disp: 30 Tab, Rfl: 3    diazePAM (VALIUM) 5 mg tablet, TAKE ONE TABLET BY MOUTH EVERY TWELVE HOURS AS NEEDED FOR ANXIETY.  max daiy AMOUNT: 10 MG, Disp: 30 Tab, Rfl: 0    montelukast (SINGULAIR) 10 mg tablet, TAKE ONE TABLET BY MOUTH EVERY DAY, Disp: 90 Tab, Rfl: 0    tiZANidine (ZANAFLEX) 4 mg tablet, TAKE ONE TABLET BY MOUTH TWICE DAILY, Disp: 180 Tab, Rfl: 0    omeprazole (PRILOSEC) 20 mg capsule, TAKE ONE CAPSULE BY MOUTH ONCE A DAY, Disp: 30 Cap, Rfl: 2    diclofenac EC (VOLTAREN) 75 mg EC tablet, TAKE ONE TABLET BY MOUTH TWICE DAILY, Disp: 60 Tab, Rfl: 2    DULoxetine (CYMBALTA) 30 mg capsule, TAKE ONE CAPSULE BY MOUTH ONCE A DAY, Disp: 90 Cap, Rfl: 1    tamsulosin (FLOMAX) 0.4 mg capsule, Take 1 Cap by mouth daily. , Disp: 90 Cap, Rfl: 1    Ventolin HFA 90 mcg/actuation inhaler, inhale 2 PUFFS by mouth EVERY 6 HOURS AS NEEDED FOR WHEEZING, Disp: 18 g, Rfl: 2    traZODone (DESYREL) 150 mg tablet, TAKE ONE TABLET BY MOUTH nightly, Disp: 90 Tab, Rfl: 0     Allergies   Allergen Reactions    Percocet [Oxycodone-Acetaminophen] Rash and Itching     itching        Patient Active Problem List    Diagnosis Date Noted    Conductive hearing loss, bilateral 07/25/2018    Depression 10/31/2015    Tobacco abuse 10/31/2015    History of left hip replacement 08/06/2015    Hyperlipemia 01/28/2015    Insomnia 01/28/2015        Review of Systems:  Constitutional: Negative for fatigue, malaise  Resp: Negative for cough, wheezing or SOB  CV: Negative for chest pain, dizziness or palpitations  GI: Negative for nausea or abdominal pain  MS: see HPI   Neuro: Negative for HA, weakness or paresthesia  Psych: Negative for depression or anxiety       Assessment/Plan:  Details of this discussion including any medical advice provided: Patient advised as a precaution to stay at home, practice regular hand washing with soap and warm water and to wear a mask and utilize social distancing when necessary to be out in public places. ICD-10-CM ICD-9-CM    1. Acute pain of right shoulder  M25.511 719.41        Symptomatic therapy suggested: call prn if symptoms persist or worsen.     Total Time: minutes: 21-30 minutes was spent on phone discussing above problems and plan. Patient medical history, prior OV notes, vitals flow sheet, lab results, medications, and allergies were reviewed during this encounter. For phone encounters:  I affirm this is a patient initiated episode with an established Patient who has not had a related appointment within my department in the past 7 days or scheduled within the next 24 hours.     Note: not billable if this call serves to triage the patient into an appointment for the relevant concern        MD RANDY Dunlap & JEAN PAUL [facility]  09/30/20

## 2020-11-24 DIAGNOSIS — M51.36 DDD (DEGENERATIVE DISC DISEASE), LUMBAR: ICD-10-CM

## 2020-11-24 DIAGNOSIS — F41.9 ANXIETY: ICD-10-CM

## 2020-11-24 RX ORDER — DIAZEPAM 5 MG/1
TABLET ORAL
Qty: 30 TAB | Refills: 0 | Status: SHIPPED | OUTPATIENT
Start: 2020-11-24 | End: 2020-12-29

## 2020-11-24 NOTE — TELEPHONE ENCOUNTER
Rx request for Valium received while pt's PCP out of the office. Controlled Substance Contract on file?: YES Date: 9/10/19 Provider: Beverly Flowers 
 reviewed and appropriate?: YES Date of last UDS: 9/20/19 Rx refilled.

## 2020-12-28 DIAGNOSIS — F17.200 TOBACCO DEPENDENCE: ICD-10-CM

## 2020-12-28 DIAGNOSIS — F33.41 RECURRENT MAJOR DEPRESSIVE DISORDER, IN PARTIAL REMISSION (HCC): ICD-10-CM

## 2020-12-29 RX ORDER — ALBUTEROL SULFATE 90 UG/1
AEROSOL, METERED RESPIRATORY (INHALATION)
Qty: 18 G | Refills: 2 | Status: SHIPPED | OUTPATIENT
Start: 2020-12-29 | End: 2022-06-07 | Stop reason: SDUPTHER

## 2020-12-29 RX ORDER — PRAVASTATIN SODIUM 40 MG/1
TABLET ORAL
Qty: 30 TAB | Refills: 3 | Status: SHIPPED | OUTPATIENT
Start: 2020-12-29 | End: 2021-03-15 | Stop reason: SDUPTHER

## 2020-12-29 RX ORDER — BUPROPION HYDROCHLORIDE 150 MG/1
TABLET ORAL
Qty: 30 TAB | Refills: 3 | Status: SHIPPED | OUTPATIENT
Start: 2020-12-29 | End: 2021-03-15 | Stop reason: SDUPTHER

## 2021-01-04 ENCOUNTER — TELEPHONE (OUTPATIENT)
Dept: FAMILY MEDICINE CLINIC | Age: 66
End: 2021-01-04

## 2021-01-04 NOTE — TELEPHONE ENCOUNTER
Returned call to patient. He states he needs his trazodone asap as he is very agitated and not sleeping. Refilled request sent to Dr. Susan Diaz.

## 2021-01-04 NOTE — TELEPHONE ENCOUNTER
Pt received a call believing it to be from the Nurse or the Survey call. He was on the phone with the insurance company regarding his medication. Please call back.  thanks

## 2021-01-05 ENCOUNTER — VIRTUAL VISIT (OUTPATIENT)
Dept: FAMILY MEDICINE CLINIC | Age: 66
End: 2021-01-05
Payer: MEDICARE

## 2021-01-05 DIAGNOSIS — F51.01 PRIMARY INSOMNIA: ICD-10-CM

## 2021-01-05 DIAGNOSIS — E78.5 HYPERLIPIDEMIA, UNSPECIFIED HYPERLIPIDEMIA TYPE: ICD-10-CM

## 2021-01-05 DIAGNOSIS — F41.9 ANXIETY: Primary | ICD-10-CM

## 2021-01-05 PROCEDURE — 99443 PR PHYS/QHP TELEPHONE EVALUATION 21-30 MIN: CPT | Performed by: FAMILY MEDICINE

## 2021-01-05 NOTE — PROGRESS NOTES
Abdulaziz Hull is a 77 y.o. male evaluated via telephone on 21. Patient Identity confirmed by . Telephone encounter done in lieu of office visit due to extraordinary circumstances. A state of national and state emergency has been declared by the President and the [place] due to the [employer] pandemic. Pursuant to the emergency declaration under the [facility] [address] [facility] authority and the [employer] Act, this Virtual  Visit was conducted, with patient's consent, to reduce the patient's risk of exposure to COVID-19 and provide continuity of care for an established patient. Chace Dennis LPN coordinated virtual visit    Consent:  Patient and/or health care decision maker is aware that he/she may receive a bill for this telephone encounter, depending on his insurance coverage, and has provided verbal consent to proceed: Yes    Physician Location: Office  Patient Location: Home    CC: Follow up of chronic health conditions. Information gathered from patient and/or health care decision maker. HPI: Abdulaziz Hull is a 77 y.o. male who was evaluated by synchronous (real-time) audio technology from his/her home. Patient with hx of insomnia, depression, HLD and chronic LBP. Patient denies HA, dizziness, SOB, CP, abdominal pain, dysuria, acute myalgias or arthralgias. He is due for lab work. Encounter Diagnoses   Name Primary?  Anxiety Yes    Primary insomnia     Hyperlipidemia, unspecified hyperlipidemia type          Current Outpatient Medications:     traZODone (DESYREL) 150 mg tablet, TAKE ONE TABLET BY MOUTH nightly, Disp: 90 Tab, Rfl: 0    diazePAM (VALIUM) 5 mg tablet, TAKE ONE TABLET BY MOUTH EVERY TWELVE HOURS AS NEEDED FOR ANXIETY.  max daily AMOUNT: 10 MG, Disp: 30 Tab, Rfl: 0    pravastatin (PRAVACHOL) 40 mg tablet, TAKE ONE TABLET BY MOUTH EVERY NIGHT, Disp: 30 Tab, Rfl: 3   buPROPion XL (WELLBUTRIN XL) 150 mg tablet, TAKE ONE TABLET BY MOUTH EVERY MORNING, Disp: 30 Tab, Rfl: 3    Ventolin HFA 90 mcg/actuation inhaler, inhale 2 PUFFS by mouth EVERY 6 HOURS AS NEEDED FOR WHEEZING, Disp: 18 g, Rfl: 2    omeprazole (PRILOSEC) 20 mg capsule, TAKE ONE CAPSULE BY MOUTH ONCE A DAY, Disp: 30 Cap, Rfl: 2    diclofenac EC (VOLTAREN) 75 mg EC tablet, TAKE ONE TABLET BY MOUTH TWICE DAILY, Disp: 60 Tab, Rfl: 2    montelukast (SINGULAIR) 10 mg tablet, TAKE ONE TABLET BY MOUTH EVERY DAY, Disp: 90 Tab, Rfl: 0    tiZANidine (ZANAFLEX) 4 mg tablet, TAKE ONE TABLET BY MOUTH TWICE DAILY, Disp: 180 Tab, Rfl: 0    predniSONE (DELTASONE) 20 mg tablet, Take 60 mg by mouth daily. , Disp: 15 Tab, Rfl: 0    DULoxetine (CYMBALTA) 30 mg capsule, TAKE ONE CAPSULE BY MOUTH ONCE A DAY, Disp: 90 Cap, Rfl: 1    tamsulosin (FLOMAX) 0.4 mg capsule, Take 1 Cap by mouth daily. , Disp: 90 Cap, Rfl: 1     Allergies   Allergen Reactions    Percocet [Oxycodone-Acetaminophen] Rash and Itching     itching        Patient Active Problem List    Diagnosis Date Noted    Conductive hearing loss, bilateral 07/25/2018    Depression 10/31/2015    Tobacco abuse 10/31/2015    History of left hip replacement 08/06/2015    Hyperlipemia 01/28/2015    Insomnia 01/28/2015        Review of Systems:  Constitutional: Negative for fatigue, malaise  Resp: Negative for cough, wheezing or SOB  CV: Negative for chest pain, dizziness or palpitations  GI: Negative for nausea or abdominal pain  MS: Negative for acute myalgias or arthralgias   Neuro: Negative for HA, weakness or paresthesia  Psych: see HPI      Assessment/Plan:  Details of this discussion including any medical advice provided: Patient advised as a precaution to stay at home, practice regular hand washing with soap and warm water and to wear a mask and utilize social distancing when necessary to be out in public places. ICD-10-CM ICD-9-CM    1. Anxiety  F41.9 300.00    2. Primary insomnia  F51.01 307.42    3. Hyperlipidemia, unspecified hyperlipidemia type  K09.3 789.1 METABOLIC PANEL, COMPREHENSIVE      TSH 3RD GENERATION      LIPID PANEL     Continue current prescribed medications as written. Symptomatic therapy suggested: call prn if symptoms persist or worsen. Total Time: minutes: 21-30 minutes was spent addressing above problems and plan. Patient medical history, prior OV notes, vitals flow sheet, lab results, medications, and allergies were reviewed during this encounter. All of the patient's questions were addressed and answered to apparent satisfaction. The patient understands and agrees with our plan of care. The patient knows to call back if they have questions about the plan of care or if symptoms change. For phone encounters:  I affirm this is a patient initiated episode with an established Patient who has not had a related appointment within my department in the past 7 days or scheduled within the next 24 hours. Note: not billable if this call serves to triage the patient into an appointment for the relevant concern    Follow-up and Dispositions    · Return in about 3 months (around 4/12/2021), or if symptoms worsen or fail to improve.          MD RANDY Steward & JEAN PAUL [facility]  01/06/21

## 2021-01-05 NOTE — PROGRESS NOTES
1. Have you been to the ER, urgent care clinic, or been hospitalized since your last visit? No     2. Have you seen or consulted any other health care providers outside of the [address] [place] [address] since your last visit?   No     Reviewed record in preparation for visit and have necessary documentation  Goals that were addressed and/or need to be completed during or after this appointment include   Health Maintenance Due   Topic Date Due    Pneumococcal 65+ years (1 of 1 - PPSV23) 12/15/2018    Flu Vaccine (1) 09/01/2020

## 2021-02-05 DIAGNOSIS — N40.1 BENIGN PROSTATIC HYPERPLASIA WITH NOCTURIA: ICD-10-CM

## 2021-02-05 DIAGNOSIS — R35.1 BENIGN PROSTATIC HYPERPLASIA WITH NOCTURIA: ICD-10-CM

## 2021-02-05 RX ORDER — TAMSULOSIN HYDROCHLORIDE 0.4 MG/1
0.4 CAPSULE ORAL DAILY
Qty: 90 CAP | Refills: 1 | Status: SHIPPED | OUTPATIENT
Start: 2021-02-05 | End: 2021-03-15 | Stop reason: SDUPTHER

## 2021-03-03 ENCOUNTER — TELEPHONE (OUTPATIENT)
Dept: FAMILY MEDICINE CLINIC | Age: 66
End: 2021-03-03

## 2021-03-03 NOTE — TELEPHONE ENCOUNTER
----- Message from Marva Kirby sent at 3/3/2021  9:20 AM EST -----  Regarding: Dr. Rajan Sandoval Message/Vendor Calls    Caller's first and last name: Nura Gonzales      Reason for call: Medication issues with Dr. Souleymane Santiagoo and Pharmacy      Callback required yes/no and why: Yes      Best contact number(s): [phone]      Details to clarify the request: Pt needs to speak with either Dr. Souleymane Mendez or one of his nurses as soon as possible regarding his medication. Pt is depressed and needs attention as soon as possible.        Marva Kirby

## 2021-03-03 NOTE — TELEPHONE ENCOUNTER
Placed call to patient. he states that he only needs his medication filled. He denies being depressed or any other problems. Advised patient Rx request already sent to Dr. Keanu Camarillo. Also, advised patient to request refill at least 1 week prior to being completely out. He verbalized understanding.

## 2021-03-10 ENCOUNTER — TELEPHONE (OUTPATIENT)
Dept: FAMILY MEDICINE CLINIC | Age: 66
End: 2021-03-10

## 2021-03-10 NOTE — TELEPHONE ENCOUNTER
Call made to pt. Pt states that he would like to know if  recommend him getting the covid vaccine given his allergies.

## 2021-03-10 NOTE — TELEPHONE ENCOUNTER
Called patient and advised per Dr Simons:'if no allergic reaction in the past to a vaccine, may take and also he would have to answer a questionnaire prior to getting vaccine. \" patient then stated that his name is on the list at [facility] to receive.

## 2021-03-10 NOTE — TELEPHONE ENCOUNTER
----- Message from Primitivo Blanca sent at 3/10/2021  2:02 PM EST -----  Regarding: Dr. Pop Lemus first and last name: n/a      Reason for call: questions about covid vaccine      Callback required yes/no and why: yes      Best contact number(s): [phone]      Details to clarify the request: n/a      Primitivo Blanca

## 2021-03-11 ENCOUNTER — TELEPHONE (OUTPATIENT)
Dept: FAMILY MEDICINE CLINIC | Age: 66
End: 2021-03-11

## 2021-03-11 NOTE — TELEPHONE ENCOUNTER
Pt would not leave message. He says I can't get the message to Dr. Page Fernandez. He wants to speak to the Nurse only.

## 2021-03-11 NOTE — TELEPHONE ENCOUNTER
Called patient. Patient states he wants to see Dr Luisa Liao so he can discuss his medications and Covid vaccine. OV scheduled 3-15-21.

## 2021-03-15 ENCOUNTER — OFFICE VISIT (OUTPATIENT)
Dept: FAMILY MEDICINE CLINIC | Age: 66
End: 2021-03-15
Payer: MEDICARE

## 2021-03-15 VITALS
SYSTOLIC BLOOD PRESSURE: 169 MMHG | OXYGEN SATURATION: 97 % | HEART RATE: 99 BPM | WEIGHT: 165.4 LBS | DIASTOLIC BLOOD PRESSURE: 113 MMHG | HEIGHT: 72 IN | TEMPERATURE: 97.5 F | RESPIRATION RATE: 20 BRPM | BODY MASS INDEX: 22.4 KG/M2

## 2021-03-15 DIAGNOSIS — F33.41 RECURRENT MAJOR DEPRESSIVE DISORDER, IN PARTIAL REMISSION (HCC): Primary | ICD-10-CM

## 2021-03-15 DIAGNOSIS — E78.5 HYPERLIPIDEMIA, UNSPECIFIED HYPERLIPIDEMIA TYPE: ICD-10-CM

## 2021-03-15 DIAGNOSIS — N40.1 BENIGN PROSTATIC HYPERPLASIA WITH NOCTURIA: ICD-10-CM

## 2021-03-15 DIAGNOSIS — J44.9 CHRONIC OBSTRUCTIVE PULMONARY DISEASE, UNSPECIFIED COPD TYPE (HCC): ICD-10-CM

## 2021-03-15 DIAGNOSIS — F51.01 PRIMARY INSOMNIA: ICD-10-CM

## 2021-03-15 DIAGNOSIS — M51.36 DDD (DEGENERATIVE DISC DISEASE), LUMBAR: ICD-10-CM

## 2021-03-15 DIAGNOSIS — K21.9 GASTROESOPHAGEAL REFLUX DISEASE: ICD-10-CM

## 2021-03-15 DIAGNOSIS — R35.1 BENIGN PROSTATIC HYPERPLASIA WITH NOCTURIA: ICD-10-CM

## 2021-03-15 PROCEDURE — 3017F COLORECTAL CA SCREEN DOC REV: CPT | Performed by: FAMILY MEDICINE

## 2021-03-15 PROCEDURE — G8427 DOCREV CUR MEDS BY ELIG CLIN: HCPCS | Performed by: FAMILY MEDICINE

## 2021-03-15 PROCEDURE — G8536 NO DOC ELDER MAL SCRN: HCPCS | Performed by: FAMILY MEDICINE

## 2021-03-15 PROCEDURE — 1101F PT FALLS ASSESS-DOCD LE1/YR: CPT | Performed by: FAMILY MEDICINE

## 2021-03-15 PROCEDURE — 99214 OFFICE O/P EST MOD 30 MIN: CPT | Performed by: FAMILY MEDICINE

## 2021-03-15 PROCEDURE — G8420 CALC BMI NORM PARAMETERS: HCPCS | Performed by: FAMILY MEDICINE

## 2021-03-15 PROCEDURE — G9717 DOC PT DX DEP/BP F/U NT REQ: HCPCS | Performed by: FAMILY MEDICINE

## 2021-03-15 RX ORDER — TAMSULOSIN HYDROCHLORIDE 0.4 MG/1
0.8 CAPSULE ORAL DAILY
Qty: 180 CAP | Refills: 0 | Status: SHIPPED | OUTPATIENT
Start: 2021-03-15 | End: 2021-06-29

## 2021-03-15 RX ORDER — BUPROPION HYDROCHLORIDE 150 MG/1
TABLET ORAL
Qty: 90 TAB | Refills: 1 | Status: SHIPPED | OUTPATIENT
Start: 2021-03-15 | End: 2021-06-29

## 2021-03-15 RX ORDER — TRAZODONE HYDROCHLORIDE 150 MG/1
TABLET ORAL
Qty: 90 TAB | Refills: 0 | Status: SHIPPED | OUTPATIENT
Start: 2021-03-15 | End: 2021-06-29

## 2021-03-15 RX ORDER — PRAVASTATIN SODIUM 40 MG/1
TABLET ORAL
Qty: 90 TAB | Refills: 1 | Status: SHIPPED | OUTPATIENT
Start: 2021-03-15 | End: 2021-06-29

## 2021-03-15 RX ORDER — DULOXETIN HYDROCHLORIDE 30 MG/1
CAPSULE, DELAYED RELEASE ORAL
Qty: 90 CAP | Refills: 1 | Status: SHIPPED | OUTPATIENT
Start: 2021-03-15 | End: 2021-06-29

## 2021-03-15 NOTE — PROGRESS NOTES
1. Have you been to the ER, urgent care clinic since your last visit? Hospitalized since your last visit? No    2. Have you seen or consulted any other health care providers outside of the [address] [place] [address] since your last visit? Include any pap smears or colon screening. No    Reviewed record in preparation for visit and have necessary documentation  Goals that were addressed and/or need to be completed during or after this appointment include     Health Maintenance Due   Topic Date Due    COVID-19 Vaccine (1) Never done    DTaP/Tdap/Td series (1 - Tdap) Never done    Shingrix Vaccine Age 50> (1 of 2) Never done    GLAUCOMA SCREENING Q2Y  Never done    AAA Screening 73-69 YO Male Smoking Patients  Never done    Pneumococcal 65+ years (1 of 1 - PPSV23) Never done    Flu Vaccine (1) Never done       Patient is accompanied by self I have received verbal consent from Jesse Lawson to discuss any/all medical information while they are present in the room.

## 2021-03-16 PROBLEM — J44.9 CHRONIC OBSTRUCTIVE PULMONARY DISEASE (HCC): Status: ACTIVE | Noted: 2021-03-16

## 2021-03-16 LAB
ALBUMIN SERPL-MCNC: 4.4 G/DL (ref 3.5–5)
ALBUMIN/GLOB SERPL: 1.3 {RATIO} (ref 1.1–2.2)
ALP SERPL-CCNC: 98 U/L (ref 45–117)
ALT SERPL-CCNC: 25 U/L (ref 12–78)
ANION GAP SERPL CALC-SCNC: 4 MMOL/L (ref 5–15)
AST SERPL-CCNC: 18 U/L (ref 15–37)
BILIRUB SERPL-MCNC: 0.7 MG/DL (ref 0.2–1)
BUN SERPL-MCNC: 18 MG/DL (ref 6–20)
BUN/CREAT SERPL: 16 (ref 12–20)
CALCIUM SERPL-MCNC: 9.4 MG/DL (ref 8.5–10.1)
CHLORIDE SERPL-SCNC: 109 MMOL/L (ref 97–108)
CHOLEST SERPL-MCNC: 214 MG/DL
CO2 SERPL-SCNC: 27 MMOL/L (ref 21–32)
CREAT SERPL-MCNC: 1.15 MG/DL (ref 0.7–1.3)
ERYTHROCYTE [DISTWIDTH] IN BLOOD BY AUTOMATED COUNT: 13.7 % (ref 11.5–14.5)
GLOBULIN SER CALC-MCNC: 3.4 G/DL (ref 2–4)
GLUCOSE SERPL-MCNC: 82 MG/DL (ref 65–100)
HCT VFR BLD AUTO: 45.8 % (ref 36.6–50.3)
HDLC SERPL-MCNC: 43 MG/DL
HDLC SERPL: 5 {RATIO} (ref 0–5)
HGB BLD-MCNC: 15 G/DL (ref 12.1–17)
LDLC SERPL CALC-MCNC: 144.2 MG/DL (ref 0–100)
LIPID PROFILE,FLP: ABNORMAL
MCH RBC QN AUTO: 29.1 PG (ref 26–34)
MCHC RBC AUTO-ENTMCNC: 32.8 G/DL (ref 30–36.5)
MCV RBC AUTO: 88.9 FL (ref 80–99)
NRBC # BLD: 0 K/UL (ref 0–0.01)
NRBC BLD-RTO: 0 PER 100 WBC
PLATELET # BLD AUTO: 262 K/UL (ref 150–400)
PMV BLD AUTO: 10.6 FL (ref 8.9–12.9)
POTASSIUM SERPL-SCNC: 4.2 MMOL/L (ref 3.5–5.1)
PROT SERPL-MCNC: 7.8 G/DL (ref 6.4–8.2)
RBC # BLD AUTO: 5.15 M/UL (ref 4.1–5.7)
SODIUM SERPL-SCNC: 140 MMOL/L (ref 136–145)
TRIGL SERPL-MCNC: 134 MG/DL (ref ?–150)
TSH SERPL DL<=0.05 MIU/L-ACNC: 1.29 UIU/ML (ref 0.36–3.74)
VLDLC SERPL CALC-MCNC: 26.8 MG/DL
WBC # BLD AUTO: 9.6 K/UL (ref 4.1–11.1)

## 2021-03-17 NOTE — PROGRESS NOTES
Chief Complaint   Patient presents with    Medication Evaluation     he is a 77y.o. year old male who presents for follow up of his chronic health conditions. Patient with hx of insomnia, depression, HLD and chronic LBP. He appears distressed. Review of medications show that he has been off of Duloxetine. He is fasting for lab work. Patient denies HA, dizziness, SOB, CP, abdominal pain, dysuria, acute myalgias or arthralgias. BP Readings from Last 3 Encounters:   03/15/21 (!) 169/113   08/05/20 155/82   02/11/20 143/86     Wt Readings from Last 3 Encounters:   03/15/21 165 lb 6.4 oz (75 kg)   08/05/20 170 lb 9.6 oz (77.4 kg)   02/11/20 160 lb (72.6 kg)     Body mass index is 22.43 kg/m². Hyperlipidemia    Cardiovascular risks for him are: LDL goal is under 100, hyperlipidemia, smoker. Currently he takes Pravachol (pravastatin)     Lab Results   Component Value Date/Time    Cholesterol, total 214 (H) 03/15/2021 02:55 PM    HDL Cholesterol 43 03/15/2021 02:55 PM    LDL, calculated 144.2 (H) 03/15/2021 02:55 PM    Triglyceride 134 03/15/2021 02:55 PM    CHOL/HDL Ratio 5.0 03/15/2021 02:55 PM     Lab Results   Component Value Date/Time    Sodium 140 03/15/2021 02:55 PM    Potassium 4.2 03/15/2021 02:55 PM    Chloride 109 (H) 03/15/2021 02:55 PM    CO2 27 03/15/2021 02:55 PM    Anion gap 4 (L) 03/15/2021 02:55 PM    Glucose 82 03/15/2021 02:55 PM    BUN 18 03/15/2021 02:55 PM    Creatinine 1.15 03/15/2021 02:55 PM    BUN/Creatinine ratio 16 03/15/2021 02:55 PM    GFR est AA >60 03/15/2021 02:55 PM    GFR est non-AA >60 03/15/2021 02:55 PM    Calcium 9.4 03/15/2021 02:55 PM    Bilirubin, total 0.7 03/15/2021 02:55 PM    ALT (SGPT) 25 03/15/2021 02:55 PM    Alk.  phosphatase 98 03/15/2021 02:55 PM    Protein, total 7.8 03/15/2021 02:55 PM    Albumin 4.4 03/15/2021 02:55 PM    Globulin 3.4 03/15/2021 02:55 PM    A-G Ratio 1.3 03/15/2021 02:55 PM      Our goal is to lower hyperlipidemia to decrease the risks of strokes and heart attacks      Patient Active Problem List   Diagnosis Code    Hyperlipemia E78.5    Insomnia G47.00    History of left hip replacement Z96.642    Depression F32.9    Tobacco abuse Z72.0    Conductive hearing loss, bilateral H90.0    Chronic obstructive pulmonary disease ([facility] [address] J44.9     Past Surgical History:   Procedure Laterality Date    COLONOSCOPY N/A 8/23/2016    COLONOSCOPY performed by Stanislaw Montanez MD at [address] COLONOSCOPY N/A 9/18/2019    COLONOSCOPY performed by Toya Cowart MD at [address] HX COLONOSCOPY      HX GI      surgery for hemorrhoids    HX HEENT      all teeth removed    HX ORTHOPAEDIC      left hip replacement    HX ORTHOPAEDIC      infection after sutured finger/then healed/then had a growth removed 2 1/2 yrs later - benign    HX OTHER SURGICAL      growth on left side of testicle removed - benign     Social History     Socioeconomic History    Marital status: SINGLE     Spouse name: Not on file    Number of children: Not on file    Years of education: Not on file    Highest education level: Not on file   Occupational History    Not on file   Social Needs    Financial resource strain: Not on file    Food insecurity     Worry: Not on file     Inability: Not on file    Transportation needs     Medical: Not on file     Non-medical: Not on file   Tobacco Use    Smoking status: Former Smoker     Packs/day: 0.50     Years: 45.00     Pack years: 22.50    Smokeless tobacco: Never Used   Substance and Sexual Activity    Alcohol use: No    Drug use: No    Sexual activity: Never   Lifestyle    Physical activity     Days per week: Not on file     Minutes per session: Not on file    Stress: Not on file   Relationships    Social connections     Talks on phone: Not on file     Gets together: Not on file     Attends Spiritism service: Not on file     Active member of club or organization: Not on file     Attends meetings of clubs or organizations: Not on file     Relationship status: Not on file    Intimate partner violence     Fear of current or ex partner: Not on file     Emotionally abused: Not on file     Physically abused: Not on file     Forced sexual activity: Not on file   Other Topics Concern    Not on file   Social History Narrative    Not on file     Family History   Problem Relation Age of Onset    Cancer Mother         breast    Stroke Mother         x2    Cancer Father         throat/lung    Heart Disease Father     Lung Disease Father     Thyroid Disease Father     Heart Attack Father     Suicide Brother     Psychiatric Disorder Brother     Heart Attack Paternal Uncle     Heart Attack Paternal Uncle         x3     Current Outpatient Medications   Medication Sig    tamsulosin (FLOMAX) 0.4 mg capsule Take 2 Caps by mouth daily.  buPROPion XL (WELLBUTRIN XL) 150 mg tablet TAKE ONE TABLET BY MOUTH EVERY MORNING    pravastatin (PRAVACHOL) 40 mg tablet TAKE ONE TABLET BY MOUTH EVERY NIGHT    DULoxetine (CYMBALTA) 30 mg capsule TAKE ONE CAPSULE BY MOUTH ONCE A DAY    traZODone (DESYREL) 150 mg tablet TAKE ONE TABLET BY MOUTH nightly    tiZANidine (ZANAFLEX) 4 mg tablet TAKE ONE TABLET BY MOUTH TWICE DAILY    diazePAM (VALIUM) 5 mg tablet TAKE ONE TABLET BY MOUTH EVERY TWELVE HOURS AS NEEDED FOR ANXIETY. max daily AMOUNT: 10 MG    diclofenac EC (VOLTAREN) 75 mg EC tablet TAKE ONE TABLET BY MOUTH TWICE DAILY    montelukast (SINGULAIR) 10 mg tablet TAKE ONE TABLET BY MOUTH EVERY DAY    Ventolin HFA 90 mcg/actuation inhaler inhale 2 PUFFS by mouth EVERY 6 HOURS AS NEEDED FOR WHEEZING    omeprazole (PRILOSEC) 20 mg capsule TAKE ONE CAPSULE BY MOUTH ONCE A DAY     No current facility-administered medications for this visit.       Allergies   Allergen Reactions    Percocet [Oxycodone-Acetaminophen] Rash and Itching     itching       Review of Systems:  Constitutional: Negative for fatigue, malaise  Derm: Negative for rash or lesion  HEENT: Negative for acute hearing changes  Resp: Negative for cough, wheezing or SOB  CV: Negative for chest pain, dizziness or palpitations  Gastrointestinal: see HPI, Negative for nausea or abdominal pain  Genital/urinary: frequent urination  MS: hx of LBP  Neuro: Negative for HA, weakness or paresthesia  Psych: History of depression, insomnia     Vitals:    03/15/21 1324 03/15/21 1332   BP: (!) 174/110 (!) 169/113   Pulse: 99    Resp: 20    Temp: 97.5 °F (36.4 °C)    TempSrc: Temporal    SpO2: 97%    Weight: 165 lb 6.4 oz (75 kg)    Height: 6' (1.829 m)        Physical Examination:  General: thin, appears distressed  Head: Normocephalic, atraumatic  Eyes: Sclera clear, EOMI  Neck: Normal range of motion  Respiratory: CTAB with symmetrical, unlabored effort  Cardiovascular: Normal S1, S2, Regular rate and rhythm  Extremities: antalgic gait, ambulates with cane  Neurologic: Normal strength and sensation, no focal deficits  Psych: labile affect: agitation, anger, tears     Diagnoses and all orders for this visit:    1. Recurrent major depressive disorder, in partial remission (HCC)  -     buPROPion XL (WELLBUTRIN XL) 150 mg tablet; TAKE ONE TABLET BY MOUTH EVERY MORNING  -     DULoxetine (CYMBALTA) 30 mg capsule; TAKE ONE CAPSULE BY MOUTH ONCE A DAY    2. Primary insomnia  -     traZODone (DESYREL) 150 mg tablet; TAKE ONE TABLET BY MOUTH nightly    3. Hyperlipidemia, unspecified hyperlipidemia type  -     pravastatin (PRAVACHOL) 40 mg tablet; TAKE ONE TABLET BY MOUTH EVERY NIGHT  -     LIPID PANEL; Future  -     METABOLIC PANEL, COMPREHENSIVE; Future  -     CBC W/O DIFF; Future  -     TSH 3RD GENERATION; Future    4. Benign prostatic hyperplasia with nocturia  -     tamsulosin (FLOMAX) 0.4 mg capsule; Take 2 Caps by mouth daily. 5. Gastroesophageal reflux disease    6. DDD (degenerative disc disease), lumbar  -     DULoxetine (CYMBALTA) 30 mg capsule; TAKE ONE CAPSULE BY MOUTH ONCE A DAY    7. Chronic obstructive pulmonary disease, unspecified COPD type ([facility] [address]      Plan of care:  Diagnoses were discussed in detail with patient. Medication risks/benefits/side effects discussed with patient. Importance of compliance with all prescribed medications discussed. All of the patient's questions were addressed and answered to apparent satisfaction. The patient understands and agrees with our plan of care. The patient knows to call back if they have questions about the plan of care or if symptoms change. The patient received an After-Visit Summary which contains VS, diagnoses, orders, allergy and medication lists. No future appointments.

## 2021-06-29 DIAGNOSIS — F41.9 ANXIETY: ICD-10-CM

## 2021-06-29 DIAGNOSIS — F33.41 RECURRENT MAJOR DEPRESSIVE DISORDER, IN PARTIAL REMISSION (HCC): ICD-10-CM

## 2021-06-29 DIAGNOSIS — N40.1 BENIGN PROSTATIC HYPERPLASIA WITH NOCTURIA: ICD-10-CM

## 2021-06-29 DIAGNOSIS — E78.5 HYPERLIPIDEMIA, UNSPECIFIED HYPERLIPIDEMIA TYPE: ICD-10-CM

## 2021-06-29 DIAGNOSIS — R35.1 BENIGN PROSTATIC HYPERPLASIA WITH NOCTURIA: ICD-10-CM

## 2021-06-29 DIAGNOSIS — M51.36 DDD (DEGENERATIVE DISC DISEASE), LUMBAR: ICD-10-CM

## 2021-06-29 DIAGNOSIS — F51.01 PRIMARY INSOMNIA: ICD-10-CM

## 2021-06-29 RX ORDER — DIAZEPAM 5 MG/1
TABLET ORAL
Qty: 30 TABLET | Refills: 0 | Status: SHIPPED | OUTPATIENT
Start: 2021-06-29 | End: 2021-09-29 | Stop reason: SDUPTHER

## 2021-06-29 RX ORDER — DULOXETIN HYDROCHLORIDE 30 MG/1
CAPSULE, DELAYED RELEASE ORAL
Qty: 90 CAPSULE | Refills: 1 | Status: SHIPPED | OUTPATIENT
Start: 2021-06-29 | End: 2021-12-22 | Stop reason: SDUPTHER

## 2021-06-29 RX ORDER — TRAZODONE HYDROCHLORIDE 150 MG/1
TABLET ORAL
Qty: 90 TABLET | Refills: 0 | Status: SHIPPED | OUTPATIENT
Start: 2021-06-29 | End: 2021-09-14 | Stop reason: SDUPTHER

## 2021-06-29 RX ORDER — PRAVASTATIN SODIUM 40 MG/1
TABLET ORAL
Qty: 90 TABLET | Refills: 1 | Status: SHIPPED | OUTPATIENT
Start: 2021-06-29 | End: 2021-12-22 | Stop reason: SDUPTHER

## 2021-06-29 RX ORDER — TAMSULOSIN HYDROCHLORIDE 0.4 MG/1
CAPSULE ORAL
Qty: 180 CAPSULE | Refills: 0 | Status: SHIPPED | OUTPATIENT
Start: 2021-06-29 | End: 2021-09-14 | Stop reason: SDUPTHER

## 2021-06-29 RX ORDER — BUPROPION HYDROCHLORIDE 150 MG/1
TABLET ORAL
Qty: 90 TABLET | Refills: 1 | Status: SHIPPED | OUTPATIENT
Start: 2021-06-29 | End: 2021-12-22 | Stop reason: SDUPTHER

## 2021-07-01 NOTE — TELEPHONE ENCOUNTER
Attempted to call. No answer. Message left. Advise patient refills sent to pharmacy and Office Visit needs to be scheduled with Dr Claudia Martini.

## 2021-08-19 ENCOUNTER — TELEPHONE (OUTPATIENT)
Dept: FAMILY MEDICINE CLINIC | Age: 66
End: 2021-08-19

## 2021-08-19 NOTE — TELEPHONE ENCOUNTER
Called patient. He stated that he would like to see Dr Rebecca Murphy and have labs drawn. Appt scheduled.

## 2021-08-19 NOTE — TELEPHONE ENCOUNTER
----- Message from Eleanora Phoenix sent at 8/19/2021  2:44 PM EDT -----  Regarding: Dr. Jeff Regalado Message/Vendor Calls    Caller's first and last name:Demetri Conte      Reason for call:test results please call the patient      Callback required yes/no and why:yes      Best contact number(s):[phone]      Details to clarify the request:please call the patient very important      Eleanora Phoenix

## 2021-08-31 ENCOUNTER — OFFICE VISIT (OUTPATIENT)
Dept: FAMILY MEDICINE CLINIC | Age: 66
End: 2021-08-31
Payer: MEDICARE

## 2021-08-31 VITALS
HEIGHT: 72 IN | BODY MASS INDEX: 21.26 KG/M2 | WEIGHT: 157 LBS | RESPIRATION RATE: 20 BRPM | HEART RATE: 84 BPM | TEMPERATURE: 98.4 F | SYSTOLIC BLOOD PRESSURE: 172 MMHG | DIASTOLIC BLOOD PRESSURE: 108 MMHG | OXYGEN SATURATION: 98 %

## 2021-08-31 DIAGNOSIS — J44.9 CHRONIC OBSTRUCTIVE PULMONARY DISEASE, UNSPECIFIED COPD TYPE (HCC): ICD-10-CM

## 2021-08-31 DIAGNOSIS — K21.9 GASTROESOPHAGEAL REFLUX DISEASE WITHOUT ESOPHAGITIS: ICD-10-CM

## 2021-08-31 DIAGNOSIS — N40.1 BENIGN PROSTATIC HYPERPLASIA WITH NOCTURIA: ICD-10-CM

## 2021-08-31 DIAGNOSIS — R35.1 BENIGN PROSTATIC HYPERPLASIA WITH NOCTURIA: ICD-10-CM

## 2021-08-31 DIAGNOSIS — M51.36 DDD (DEGENERATIVE DISC DISEASE), LUMBAR: ICD-10-CM

## 2021-08-31 DIAGNOSIS — I10 ESSENTIAL HYPERTENSION: ICD-10-CM

## 2021-08-31 DIAGNOSIS — E78.5 HYPERLIPIDEMIA, UNSPECIFIED HYPERLIPIDEMIA TYPE: Primary | ICD-10-CM

## 2021-08-31 DIAGNOSIS — F51.01 PRIMARY INSOMNIA: ICD-10-CM

## 2021-08-31 LAB
COMMENT, HOLDF: NORMAL
SAMPLES BEING HELD,HOLD: NORMAL

## 2021-08-31 PROCEDURE — 3017F COLORECTAL CA SCREEN DOC REV: CPT | Performed by: FAMILY MEDICINE

## 2021-08-31 PROCEDURE — G9717 DOC PT DX DEP/BP F/U NT REQ: HCPCS | Performed by: FAMILY MEDICINE

## 2021-08-31 PROCEDURE — 99215 OFFICE O/P EST HI 40 MIN: CPT | Performed by: FAMILY MEDICINE

## 2021-08-31 PROCEDURE — G8420 CALC BMI NORM PARAMETERS: HCPCS | Performed by: FAMILY MEDICINE

## 2021-08-31 PROCEDURE — 93000 ELECTROCARDIOGRAM COMPLETE: CPT | Performed by: FAMILY MEDICINE

## 2021-08-31 PROCEDURE — 1101F PT FALLS ASSESS-DOCD LE1/YR: CPT | Performed by: FAMILY MEDICINE

## 2021-08-31 PROCEDURE — G8427 DOCREV CUR MEDS BY ELIG CLIN: HCPCS | Performed by: FAMILY MEDICINE

## 2021-08-31 PROCEDURE — G8536 NO DOC ELDER MAL SCRN: HCPCS | Performed by: FAMILY MEDICINE

## 2021-08-31 RX ORDER — LOSARTAN POTASSIUM 25 MG/1
25 TABLET ORAL DAILY
Qty: 30 TABLET | Refills: 1 | Status: SHIPPED | OUTPATIENT
Start: 2021-08-31 | End: 2021-09-14

## 2021-08-31 NOTE — PROGRESS NOTES
Chief Complaint   Patient presents with    Follow Up Chronic Condition     he is a 77y.o. year old male who presents for follow up of his chronic health conditions. Patient with hx of insomnia, depression, HLD and chronic LBP. He appears distressed. He reports anxiety high due to Covid-19. BP elevated again and medication for HTN discussed. Patient denies HA, dizziness, SOB, abdominal pain, dysuria, acute myalgias or arthralgias. BP Readings from Last 3 Encounters:   08/31/21 (!) 172/108   03/15/21 (!) 169/113   08/05/20 155/82     Wt Readings from Last 3 Encounters:   08/31/21 157 lb (71.2 kg)   03/15/21 165 lb 6.4 oz (75 kg)   08/05/20 170 lb 9.6 oz (77.4 kg)     Body mass index is 21.29 kg/m². Hyperlipidemia    Cardiovascular risks for him are: hyperlipidemia, smoker. Currently he takes Pravachol (pravastatin)     Lab Results   Component Value Date/Time    Cholesterol, total 214 (H) 03/15/2021 02:55 PM    HDL Cholesterol 43 03/15/2021 02:55 PM    LDL, calculated 144.2 (H) 03/15/2021 02:55 PM    Triglyceride 134 03/15/2021 02:55 PM    CHOL/HDL Ratio 5.0 03/15/2021 02:55 PM     Lab Results   Component Value Date/Time    Sodium 140 03/15/2021 02:55 PM    Potassium 4.2 03/15/2021 02:55 PM    Chloride 109 (H) 03/15/2021 02:55 PM    CO2 27 03/15/2021 02:55 PM    Anion gap 4 (L) 03/15/2021 02:55 PM    Glucose 82 03/15/2021 02:55 PM    BUN 18 03/15/2021 02:55 PM    Creatinine 1.15 03/15/2021 02:55 PM    BUN/Creatinine ratio 16 03/15/2021 02:55 PM    GFR est AA >60 03/15/2021 02:55 PM    GFR est non-AA >60 03/15/2021 02:55 PM    Calcium 9.4 03/15/2021 02:55 PM    Bilirubin, total 0.7 03/15/2021 02:55 PM    ALT (SGPT) 25 03/15/2021 02:55 PM    Alk.  phosphatase 98 03/15/2021 02:55 PM    Protein, total 7.8 03/15/2021 02:55 PM    Albumin 4.4 03/15/2021 02:55 PM    Globulin 3.4 03/15/2021 02:55 PM    A-G Ratio 1.3 03/15/2021 02:55 PM      Our goal is to lower hyperlipidemia to decrease the risks of strokes and heart attacks      Patient Active Problem List   Diagnosis Code    Hyperlipemia E78.5    Insomnia G47.00    History of left hip replacement Z96.642    Depression F32.9    Tobacco abuse Z72.0    Conductive hearing loss, bilateral H90.0    Chronic obstructive pulmonary disease ([facility] J44.9     Past Surgical History:   Procedure Laterality Date    COLONOSCOPY N/A 8/23/2016    [facility]allero [facility]OSCOPY    COLONOSCOPY N/A 9/18/2019    COLONOSCOPY performed by Bonnie Nassar MD at [facility] [address] HX COLONOSCOPY      HX GI      surgery for hemorrhoids    HX HEENT      all teeth removed    HX ORTHOPAEDIC      left hip replacement    HX ORTHOPAEDIC      infection after sutured finger/then healed/then had a growth removed 2 1/2 yrs later - benign    HX OTHER SURGICAL      growth on left side of testicle removed - benign     Social History     Socioeconomic History    Marital status: SINGLE     Spouse name: Not on file    Number of children: Not on file    Years of education: Not on file    Highest education level: Not on file   Occupational History    Not on file   Tobacco Use    Smoking status: Former Smoker     Packs/day: 0.50     Years: 45.00     Pack years: 22.50    Smokeless tobacco: Never Used   Substance and Sexual Activity    Alcohol use: No    Drug use: No    Sexual activity: Never   Other Topics Concern    Not on file   Social History Narrative    Not on file     Social Determinants of Health     Financial Resource Strain:     Difficulty of Paying Living Expenses:    Food Insecurity:     Worried About Running Out of Food in the Last Year:     Ran Out of Food in the Last Year:    Transportation Needs:     Lack of Transportation (Medical):      Lack of Transportation (Non-Medical):    Physical Activity:     Days of Exercise per Week:     Minutes of Exercise per Session:    Stress:     Feeling of Stress :    Social Connections:     Frequency of Communication with Friends and Family:     Frequency of Social Gatherings with Friends and Family:     Attends Church Services:     Active Member of Clubs or Organizations:     Attends Club or Organization Meetings:     Marital Status:    Intimate Partner Violence:     Fear of Current or Ex-Partner:     Emotionally Abused:     Physically Abused:     Sexually Abused:      Family History   Problem Relation Age of Onset    Cancer Mother         breast    Stroke Mother         x2    Cancer Father         throat/lung    Heart Disease Father     Lung Disease Father     Thyroid Disease Father     Heart Attack Father     Suicide Brother     Psychiatric Disorder Brother     Heart Attack Paternal Uncle     Heart Attack Paternal Uncle         x3     Current Outpatient Medications   Medication Sig    losartan (COZAAR) 25 mg tablet Take 1 Tablet by mouth daily. Indications: high blood pressure    omeprazole (PRILOSEC) 20 mg capsule TAKE ONE CAPSULE BY MOUTH ONCE A DAY    montelukast (SINGULAIR) 10 mg tablet TAKE ONE TABLET BY MOUTH EVERY DAY    tiZANidine (ZANAFLEX) 4 mg tablet TAKE ONE TABLET BY MOUTH TWICE DAILY    traZODone (DESYREL) 150 mg tablet TAKE ONE TABLET BY MOUTH nightly    tamsulosin (FLOMAX) 0.4 mg capsule TAKE TWO CAPSULES BY MOUTH DAILY    diazePAM (VALIUM) 5 mg tablet TAKE ONE TABLET BY MOUTH EVERY TWELVE HOURS AS NEEDED FOR ANXIETY. max daily AMOUNT: 10 MG    pravastatin (PRAVACHOL) 40 mg tablet TAKE ONE TABLET BY MOUTH EVERY NIGHT    buPROPion XL (WELLBUTRIN XL) 150 mg tablet TAKE ONE TABLET BY MOUTH EVERY MORNING    DULoxetine (CYMBALTA) 30 mg capsule TAKE ONE CAPSULE BY MOUTH ONCE A DAY    diclofenac EC (VOLTAREN) 75 mg EC tablet TAKE ONE TABLET BY MOUTH TWICE DAILY    Ventolin HFA 90 mcg/actuation inhaler inhale 2 PUFFS by mouth EVERY 6 HOURS AS NEEDED FOR WHEEZING     No current facility-administered medications for this visit.      Allergies   Allergen Reactions    Percocet [Oxycodone-Acetaminophen] Rash and Itching     itching       Review of Systems:  Constitutional: Negative for fatigue, malaise  Derm: Negative for rash or lesion  HEENT: Negative for acute hearing changes  Resp: Negative for cough, wheezing or SOB  CV: Negative for chest pain, dizziness or palpitations  Gastrointestinal: see HPI, Negative for nausea or abdominal pain  Genital/urinary: frequent urination  MS: hx of LBP  Neuro: Negative for HA, weakness or paresthesia  Psych: History of anxiety, depression and insomnia     Vitals:    08/31/21 0819 08/31/21 0827   BP: (!) 153/89 (!) 172/108   Pulse: 84    Resp: 20    Temp: 98.4 °F (36.9 °C)    TempSrc: Oral    SpO2: 98%    Weight: 157 lb (71.2 kg)    Height: 6' (1.829 m)        Physical Examination:  General: thin, appears distressed  Head: Normocephalic, atraumatic  Eyes: Sclera clear, EOMI  Neck: Normal range of motion  Respiratory: CTAB with symmetrical, unlabored effort  Cardiovascular: Normal S1, S2, Regular rate and rhythm  Extremities: antalgic gait, ambulates with cane  Neurologic: Normal strength and sensation, no focal deficits  Psych: labile affect: agitation, anger, tears     Diagnoses and all orders for this visit:    1. Hyperlipidemia, unspecified hyperlipidemia type  -     LIPID PANEL; Future  -     METABOLIC PANEL, COMPREHENSIVE; Future  -     TSH 3RD GENERATION; Future  -     AMB POC EKG ROUTINE W/ 12 LEADS, INTER & REP    2. Essential hypertension  -     losartan (COZAAR) 25 mg tablet; Take 1 Tablet by mouth daily. Indications: high blood pressure    3. Chronic obstructive pulmonary disease, unspecified COPD type ([address]  -     HGB & HCT; Future    4. Gastroesophageal reflux disease without esophagitis  -     HGB & HCT; Future    5. Benign prostatic hyperplasia with nocturia  -     PSA, DIAGNOSTIC (PROSTATE SPECIFIC AG); Future    6. Primary insomnia    7.  DDD (degenerative disc disease), lumbar      Plan of care:  Diagnoses were discussed in detail with patient. Discussed EKG results and need for HTN medication. Medication risks/benefits/side effects discussed with patient. Importance of compliance with all prescribed medications discussed. All of the patient's questions were addressed and answered to apparent satisfaction. The patient understands and agrees with our plan of care. The patient knows to call back if they have questions about the plan of care or if symptoms change. The patient received an After-Visit Summary which contains VS, diagnoses, orders, allergy and medication lists. >40 minutes spent face to face with patient. Problems with Epic during encounter. Future Appointments   Date Time Provider Marzena Chopra   9/14/2021  9:20 AM Dorian Orozco MD [facility] BS AMB         Follow-up and Dispositions    · Return in about 2 weeks (around 9/14/2021).

## 2021-08-31 NOTE — PATIENT INSTRUCTIONS
Medicare Wellness Visit, Male    The best way to live healthy is to have a lifestyle where you eat a well-balanced diet, exercise regularly, limit alcohol use, and quit all forms of tobacco/nicotine, if applicable. Regular preventive services are another way to keep healthy. Preventive services (vaccines, screening tests, monitoring & exams) can help personalize your care plan, which helps you manage your own care. Screening tests can find health problems at the earliest stages, when they are easiest to treat. [facility] follows the current, evidence-based guidelines published by the [facility] R[facility] Kanchan ([facility]) when recommending preventive services for our patients. Because we follow these guidelines, sometimes recommendations change over time as research supports it. (For example, a prostate screening blood test is no longer routinely recommended for men with no symptoms). Of course, you and your doctor may decide to screen more often for some diseases, based on your risk and co-morbidities (chronic disease you are already diagnosed with). Preventive services for you include:  - Medicare offers their members a free annual wellness visit, which is time for you and your primary care provider to discuss and plan for your preventive service needs. Take advantage of this benefit every year!  -All adults over age 72 should receive the recommended pneumonia vaccines. Current USPSTF guidelines recommend a series of two vaccines for the best pneumonia protection.   -All adults should have a flu vaccine yearly and tetanus vaccine every 10 years.  -All adults age 48 and older should receive the shingles vaccines (series of two vaccines).        -All adults age 38-68 who are overweight should have a diabetes screening test once every three years.   -Other screening tests & preventive services for persons with diabetes include: an eye exam to screen for diabetic retinopathy, a kidney function test, a foot exam, and stricter control over your cholesterol.   -Cardiovascular screening for adults with routine risk involves an electrocardiogram (ECG) at intervals determined by the provider.   -Colorectal cancer screening should be done for adults age 54-65 with no increased risk factors for colorectal cancer. There are a number of acceptable methods of screening for this type of cancer. Each test has its own benefits and drawbacks. Discuss with your provider what is most appropriate for you during your annual wellness visit. The different tests include: colonoscopy (considered the best screening method), a fecal occult blood test, a fecal DNA test, and sigmoidoscopy.  -All adults born between [facility] should be screened once for Hepatitis C.  -An Abdominal Aortic Aneurysm (AAA) Screening is recommended for men age 73-68 who has ever smoked in their lifetime.      Here is a list of your current Health Maintenance items (your personalized list of preventive services) with a due date:  Health Maintenance Due   Topic Date Due    COVID-19 Vaccine (1) Never done    DTaP/Tdap/Td  (1 - Tdap) Never done    Shingles Vaccine (1 of 2) Never done    AAA Screening  Never done    Pneumococcal Vaccine (1 of 1 - PPSV23) Never done    Annual Well Visit  08/06/2021

## 2021-08-31 NOTE — PROGRESS NOTES
1. Have you been to the ER, urgent care clinic since your last visit? Hospitalized since your last visit? No    2. Have you seen or consulted any other health care providers outside of the [address] [place] [address] since your last visit? Include any pap smears or colon screening. No    Reviewed record in preparation for visit and have necessary documentation  Goals that were addressed and/or need to be completed during or after this appointment include     Health Maintenance Due   Topic Date Due    COVID-19 Vaccine (1) Never done    DTaP/Tdap/Td series (1 - Tdap) Never done    Shingrix Vaccine Age 50> (1 of 2) Never done    AAA Screening 73-67 YO Male Smoking Patients  Never done    Pneumococcal 65+ years (1 of 1 - PPSV23) Never done    Medicare Yearly Exam  08/06/2021       Patient is accompanied by self I have received verbal consent from Maddie Perkins to discuss any/all medical information while they are present in the room.

## 2021-09-01 LAB
ALBUMIN SERPL-MCNC: 4.2 G/DL (ref 3.5–5)
ALBUMIN/GLOB SERPL: 1.3 {RATIO} (ref 1.1–2.2)
ALP SERPL-CCNC: 105 U/L (ref 45–117)
ALT SERPL-CCNC: 29 U/L (ref 12–78)
ANION GAP SERPL CALC-SCNC: 3 MMOL/L (ref 5–15)
AST SERPL-CCNC: 16 U/L (ref 15–37)
BILIRUB SERPL-MCNC: 0.5 MG/DL (ref 0.2–1)
BUN SERPL-MCNC: 21 MG/DL (ref 6–20)
BUN/CREAT SERPL: 20 (ref 12–20)
CALCIUM SERPL-MCNC: 9.6 MG/DL (ref 8.5–10.1)
CHLORIDE SERPL-SCNC: 109 MMOL/L (ref 97–108)
CHOLEST SERPL-MCNC: 185 MG/DL
CO2 SERPL-SCNC: 27 MMOL/L (ref 21–32)
CREAT SERPL-MCNC: 1.03 MG/DL (ref 0.7–1.3)
GLOBULIN SER CALC-MCNC: 3.2 G/DL (ref 2–4)
GLUCOSE SERPL-MCNC: 85 MG/DL (ref 65–100)
HCT VFR BLD AUTO: 45.9 % (ref 36.6–50.3)
HDLC SERPL-MCNC: 53 MG/DL
HDLC SERPL: 3.5 {RATIO} (ref 0–5)
HGB BLD-MCNC: 14.5 G/DL (ref 12.1–17)
LDLC SERPL CALC-MCNC: 114.6 MG/DL (ref 0–100)
POTASSIUM SERPL-SCNC: 5.1 MMOL/L (ref 3.5–5.1)
PROT SERPL-MCNC: 7.4 G/DL (ref 6.4–8.2)
PSA SERPL-MCNC: 2.4 NG/ML (ref 0.01–4)
SODIUM SERPL-SCNC: 139 MMOL/L (ref 136–145)
TRIGL SERPL-MCNC: 87 MG/DL (ref ?–150)
TSH SERPL DL<=0.05 MIU/L-ACNC: 1.49 UIU/ML (ref 0.36–3.74)
VLDLC SERPL CALC-MCNC: 17.4 MG/DL

## 2021-09-09 DIAGNOSIS — M94.0 COSTOCHONDRITIS, ACUTE: ICD-10-CM

## 2021-09-10 RX ORDER — DICLOFENAC SODIUM 75 MG/1
75 TABLET, DELAYED RELEASE ORAL 2 TIMES DAILY
Qty: 60 TABLET | Refills: 2 | Status: SHIPPED | OUTPATIENT
Start: 2021-09-10 | End: 2021-09-14 | Stop reason: SDUPTHER

## 2021-09-10 NOTE — TELEPHONE ENCOUNTER
----- Message from Byron Oden sent at 9/10/2021  9:25 AM EDT -----  Regarding: /Refill  General Message/Vendor Calls    Caller's first and last name:N/A      Reason for call:  Medication Pending Refill      Callback required yes/no and why:Yes      Best contact number(s):[phone]      Details to clarify the request: Patient is in town waiting on prescription, please call him on pending RX  diclofenac EC (VOLTAREN) 75 mg EC tablet         Byron Oden

## 2021-09-14 ENCOUNTER — OFFICE VISIT (OUTPATIENT)
Dept: FAMILY MEDICINE CLINIC | Age: 66
End: 2021-09-14
Payer: MEDICARE

## 2021-09-14 VITALS
TEMPERATURE: 98.2 F | BODY MASS INDEX: 21.43 KG/M2 | DIASTOLIC BLOOD PRESSURE: 90 MMHG | HEIGHT: 72 IN | OXYGEN SATURATION: 96 % | HEART RATE: 77 BPM | SYSTOLIC BLOOD PRESSURE: 165 MMHG | WEIGHT: 158.2 LBS | RESPIRATION RATE: 20 BRPM

## 2021-09-14 DIAGNOSIS — R35.1 BENIGN PROSTATIC HYPERPLASIA WITH NOCTURIA: ICD-10-CM

## 2021-09-14 DIAGNOSIS — J04.2 LARYNGOTRACHEITIS: ICD-10-CM

## 2021-09-14 DIAGNOSIS — M51.36 DDD (DEGENERATIVE DISC DISEASE), LUMBAR: ICD-10-CM

## 2021-09-14 DIAGNOSIS — J44.9 CHRONIC OBSTRUCTIVE PULMONARY DISEASE, UNSPECIFIED COPD TYPE (HCC): ICD-10-CM

## 2021-09-14 DIAGNOSIS — N40.1 BENIGN PROSTATIC HYPERPLASIA WITH NOCTURIA: ICD-10-CM

## 2021-09-14 DIAGNOSIS — F51.01 PRIMARY INSOMNIA: ICD-10-CM

## 2021-09-14 DIAGNOSIS — I10 ESSENTIAL HYPERTENSION: Primary | ICD-10-CM

## 2021-09-14 PROCEDURE — G8536 NO DOC ELDER MAL SCRN: HCPCS | Performed by: FAMILY MEDICINE

## 2021-09-14 PROCEDURE — G9717 DOC PT DX DEP/BP F/U NT REQ: HCPCS | Performed by: FAMILY MEDICINE

## 2021-09-14 PROCEDURE — 1101F PT FALLS ASSESS-DOCD LE1/YR: CPT | Performed by: FAMILY MEDICINE

## 2021-09-14 PROCEDURE — G8420 CALC BMI NORM PARAMETERS: HCPCS | Performed by: FAMILY MEDICINE

## 2021-09-14 PROCEDURE — G8427 DOCREV CUR MEDS BY ELIG CLIN: HCPCS | Performed by: FAMILY MEDICINE

## 2021-09-14 PROCEDURE — 3017F COLORECTAL CA SCREEN DOC REV: CPT | Performed by: FAMILY MEDICINE

## 2021-09-14 PROCEDURE — 99214 OFFICE O/P EST MOD 30 MIN: CPT | Performed by: FAMILY MEDICINE

## 2021-09-14 RX ORDER — TRAZODONE HYDROCHLORIDE 150 MG/1
150 TABLET ORAL
Qty: 90 TABLET | Refills: 0 | Status: SHIPPED | OUTPATIENT
Start: 2021-09-14 | End: 2021-12-22 | Stop reason: SDUPTHER

## 2021-09-14 RX ORDER — TAMSULOSIN HYDROCHLORIDE 0.4 MG/1
CAPSULE ORAL
Qty: 180 CAPSULE | Refills: 0 | Status: SHIPPED | OUTPATIENT
Start: 2021-09-14 | End: 2021-12-29

## 2021-09-14 RX ORDER — DICLOFENAC SODIUM 75 MG/1
75 TABLET, DELAYED RELEASE ORAL 2 TIMES DAILY
Qty: 180 TABLET | Refills: 0 | Status: SHIPPED | OUTPATIENT
Start: 2021-09-14 | End: 2021-12-29

## 2021-09-14 RX ORDER — LOSARTAN POTASSIUM 50 MG/1
50 TABLET ORAL DAILY
Qty: 90 TABLET | Refills: 1 | Status: SHIPPED | OUTPATIENT
Start: 2021-09-14 | End: 2021-12-29 | Stop reason: SDUPTHER

## 2021-09-14 NOTE — PROGRESS NOTES
1. Have you been to the ER, urgent care clinic since your last visit? Hospitalized since your last visit? No    2. Have you seen or consulted any other health care providers outside of the [address] [place] [address] since your last visit? Include any pap smears or colon screening. No    Reviewed record in preparation for visit and have necessary documentation  Goals that were addressed and/or need to be completed during or after this appointment include     Health Maintenance Due   Topic Date Due    COVID-19 Vaccine (1) Never done    DTaP/Tdap/Td series (1 - Tdap) Never done    Shingrix Vaccine Age 50> (1 of 2) Never done    AAA Screening 73-69 YO Male Smoking Patients  Never done    Pneumococcal 65+ years (1 of 1 - PPSV23) Never done    Medicare Yearly Exam  08/06/2021    Flu Vaccine (1) Never done       Patient is accompanied by self I have received verbal consent from Yeison Moreno to discuss any/all medical information while they are present in the room.

## 2021-09-20 NOTE — PROGRESS NOTES
Chief Complaint   Patient presents with    Follow-up     he is a 77y.o. year old male who presents for follow up of his chronic health conditions. Patient with hx of COPD, insomnia, depression, HLD and chronic LBP. He reports anxiety high due to Covid-19. He has started medication though BP elevated again today. He complains of persistent sore throat and episodic swallowing difficulty. 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 Patient denies HA, dizziness, SOB, abdominal pain, dysuria, acute myalgias or arthralgias. BP Readings from Last 3 Encounters:   09/14/21 (!) 165/90   08/31/21 (!) 172/108   03/15/21 (!) 169/113     Wt Readings from Last 3 Encounters:   09/14/21 158 lb 3.2 oz (71.8 kg)   08/31/21 157 lb (71.2 kg)   03/15/21 165 lb 6.4 oz (75 kg)     Body mass index is 21.46 kg/m². Hyperlipidemia    Cardiovascular risks for him are: hyperlipidemia, smoker. Currently he takes Pravachol (pravastatin)     Lab Results   Component Value Date/Time    Cholesterol, total 185 08/31/2021 09:40 AM    HDL Cholesterol 53 08/31/2021 09:40 AM    LDL, calculated 114.6 (H) 08/31/2021 09:40 AM    Triglyceride 87 08/31/2021 09:40 AM    CHOL/HDL Ratio 3.5 08/31/2021 09:40 AM     Lab Results   Component Value Date/Time    Sodium 139 08/31/2021 09:40 AM    Potassium 5.1 08/31/2021 09:40 AM    Chloride 109 (H) 08/31/2021 09:40 AM    CO2 27 08/31/2021 09:40 AM    Anion gap 3 (L) 08/31/2021 09:40 AM    Glucose 85 08/31/2021 09:40 AM    BUN 21 (H) 08/31/2021 09:40 AM    Creatinine 1.03 08/31/2021 09:40 AM    BUN/Creatinine ratio 20 08/31/2021 09:40 AM    GFR est AA >60 08/31/2021 09:40 AM    GFR est non-AA >60 08/31/2021 09:40 AM    Calcium 9.6 08/31/2021 09:40 AM    Bilirubin, total 0.5 08/31/2021 09:40 AM    ALT (SGPT) 29 08/31/2021 09:40 AM    Alk.  phosphatase 105 08/31/2021 09:40 AM    Protein, total 7.4 08/31/2021 09:40 AM    Albumin 4.2 08/31/2021 09:40 AM    Globulin 3.2 08/31/2021 09:40 AM    A-G Ratio 1.3 08/31/2021 09:40 AM      Our goal is to lower hyperlipidemia to decrease the risks of strokes and heart attacks      Patient Active Problem List   Diagnosis Code    Hyperlipemia E78.5    Insomnia G47.00    History of left hip replacement Z96.642    Depression F32.9    Tobacco abuse Z72.0    Conductive hearing loss, bilateral H90.0    Chronic obstructive pulmonary disease ([address] J44.9     Past Surgical History:   Procedure Laterality Date    COLONOSCOPY N/A 8/23/2016    COLONOSCOPY performed by Aidee Landin [facility] at [facility] ENDOSCOPY    COLONOSCOPY N/A 9/18/2019    COLONOSCOPY performed by Ramon Goltz, MD at [address] HX COLONOSCOPY      HX GI      surgery for hemorrhoids    HX HEENT      all teeth removed    HX ORTHOPAEDIC      left hip replacement    HX ORTHOPAEDIC      infection after sutured finger/then healed/then had a growth removed 2 1/2 yrs later - benign    HX OTHER SURGICAL      growth on left side of testicle removed - benign     Social History     Socioeconomic History    Marital status: SINGLE     Spouse name: Not on file    Number of children: Not on file    Years of education: Not on file    Highest education level: Not on file   Occupational History    Not on file   Tobacco Use    Smoking status: Former Smoker     Packs/day: 0.50     Years: 45.00     Pack years: 22.50    Smokeless tobacco: Never Used   Substance and Sexual Activity    Alcohol use: No    Drug use: No    Sexual activity: Never   Other Topics Concern    Not on file   Social History Narrative    Not on file     Social Determinants of Health     Financial Resource Strain:     Difficulty of Paying Living Expenses:    Food Insecurity:     Worried About Running Out of Food in the Last Year:     Ran Out of Food in the Last Year:    Transportation Needs:     Lack of Transportation (Medical):      Lack of Transportation (Non-Medical):    Physical Activity:     Days of Exercise per Week:  Minutes of Exercise per Session:    Stress:     Feeling of Stress :    Social Connections:     Frequency of Communication with Friends and Family:     Frequency of Social Gatherings with Friends and Family:     Attends Synagogue Services:     Active Member of Clubs or Organizations:     Attends Club or Organization Meetings:     Marital Status:    Intimate Partner Violence:     Fear of Current or Ex-Partner:     Emotionally Abused:     Physically Abused:     Sexually Abused:      Family History   Problem Relation Age of Onset    Cancer Mother         breast    Stroke Mother         x2    Cancer Father         throat/lung    Heart Disease Father     Lung Disease Father     Thyroid Disease Father     Heart Attack Father     Suicide Brother     Psychiatric Disorder Brother     Heart Attack Paternal Uncle     Heart Attack Paternal Uncle         x3     Current Outpatient Medications   Medication Sig    losartan (COZAAR) 50 mg tablet Take 1 Tablet by mouth daily. Indications: high blood pressure    diclofenac EC (VOLTAREN) 75 mg EC tablet Take 1 Tablet by mouth two (2) times a day.  traZODone (DESYREL) 150 mg tablet Take 1 Tablet by mouth nightly.  tamsulosin (FLOMAX) 0.4 mg capsule TAKE TWO CAPSULES BY MOUTH DAILY    omeprazole (PRILOSEC) 20 mg capsule TAKE ONE CAPSULE BY MOUTH ONCE A DAY    montelukast (SINGULAIR) 10 mg tablet TAKE ONE TABLET BY MOUTH EVERY DAY    tiZANidine (ZANAFLEX) 4 mg tablet TAKE ONE TABLET BY MOUTH TWICE DAILY    diazePAM (VALIUM) 5 mg tablet TAKE ONE TABLET BY MOUTH EVERY TWELVE HOURS AS NEEDED FOR ANXIETY.  max daily AMOUNT: 10 MG    pravastatin (PRAVACHOL) 40 mg tablet TAKE ONE TABLET BY MOUTH EVERY NIGHT    buPROPion XL (WELLBUTRIN XL) 150 mg tablet TAKE ONE TABLET BY MOUTH EVERY MORNING    DULoxetine (CYMBALTA) 30 mg capsule TAKE ONE CAPSULE BY MOUTH ONCE A DAY    Ventolin HFA 90 mcg/actuation inhaler inhale 2 PUFFS by mouth EVERY 6 HOURS AS NEEDED FOR WHEEZING     No current facility-administered medications for this visit. Allergies   Allergen Reactions    Percocet [Oxycodone-Acetaminophen] Rash and Itching     itching       Review of Systems:  Constitutional: Negative for fatigue, malaise  Derm: Negative for rash or lesion  HEENT: Negative for acute hearing changes  Resp: Negative for cough, wheezing or SOB  CV: Negative for chest pain, dizziness or palpitations  Gastrointestinal: see HPI, Negative for nausea or abdominal pain  Genital/urinary: frequent urination  MS: hx of LBP  Neuro: Negative for HA, weakness or paresthesia  Psych: History of anxiety, depression and insomnia     Vitals:    09/14/21 0934 09/14/21 0943   BP: (!) 156/100 (!) 165/90   Pulse: 77    Resp: 20    Temp: 98.2 °F (36.8 °C)    TempSrc: Oral    SpO2: 96%    Weight: 158 lb 3.2 oz (71.8 kg)    Height: 6' (1.829 m)        Physical Examination:  General: thin, appears distressed  Head: Normocephalic, atraumatic  Eyes: Sclera clear, EOMI  Neck: Normal range of motion  Respiratory: CTAB with symmetrical, unlabored effort  Cardiovascular: Normal S1, S2, Regular rate and rhythm  Extremities: antalgic gait, ambulates with cane  Neurologic: Normal strength and sensation, no focal deficits  Psych: labile affect: agitation, anger, tears     Diagnoses and all orders for this visit:    1. Essential hypertension  -     losartan (COZAAR) 50 mg tablet; Take 1 Tablet by mouth daily. Indications: high blood pressure    2. Laryngotracheitis  -     REFERRAL TO ENT-OTOLARYNGOLOGY    3. Chronic obstructive pulmonary disease, unspecified COPD type ([address]  -     XR CHEST PA LAT; Future    4. Primary insomnia  -     traZODone (DESYREL) 150 mg tablet; Take 1 Tablet by mouth nightly. 5. DDD (degenerative disc disease), lumbar  -     diclofenac EC (VOLTAREN) 75 mg EC tablet; Take 1 Tablet by mouth two (2) times a day.     6. Benign prostatic hyperplasia with nocturia  -     tamsulosin (FLOMAX) 0.4 mg capsule; TAKE TWO CAPSULES BY MOUTH DAILY      Plan of care:  Diagnoses were discussed in detail with patient. Medication risks/benefits/side effects discussed with patient. Importance of compliance with all prescribed medications discussed. All of the patient's questions were addressed and answered to apparent satisfaction. The patient understands and agrees with our plan of care. The patient knows to call back if they have questions about the plan of care or if symptoms change. The patient received an After-Visit Summary which contains VS, diagnoses, orders, allergy and medication lists. >40 minutes spent face to face with patient. Problems with Epic during encounter. Future Appointments   Date Time Provider Marzena Chopra   12/15/2021  9:00 AM Yobani Hess MD [id number]         Follow-up and Dispositions    · Return in about 3 months (around 12/14/2021), or if symptoms worsen or fail to improve.

## 2021-09-29 DIAGNOSIS — M51.36 DDD (DEGENERATIVE DISC DISEASE), LUMBAR: ICD-10-CM

## 2021-09-29 DIAGNOSIS — F41.9 ANXIETY: ICD-10-CM

## 2021-09-29 RX ORDER — DIAZEPAM 5 MG/1
TABLET ORAL
Qty: 30 TABLET | Refills: 0 | Status: SHIPPED | OUTPATIENT
Start: 2021-09-29 | End: 2021-12-22 | Stop reason: SDUPTHER

## 2021-12-15 ENCOUNTER — OFFICE VISIT (OUTPATIENT)
Dept: FAMILY MEDICINE CLINIC | Age: 66
End: 2021-12-15

## 2021-12-15 VITALS
DIASTOLIC BLOOD PRESSURE: 97 MMHG | RESPIRATION RATE: 20 BRPM | WEIGHT: 164 LBS | TEMPERATURE: 99.4 F | HEIGHT: 72 IN | OXYGEN SATURATION: 98 % | SYSTOLIC BLOOD PRESSURE: 168 MMHG | HEART RATE: 81 BPM | BODY MASS INDEX: 22.21 KG/M2

## 2021-12-15 NOTE — PROGRESS NOTES
1. Have you been to the ER, urgent care clinic since your last visit? Hospitalized since your last visit? No    2. Have you seen or consulted any other health care providers outside of the [address] [place] [address] since your last visit? Include any pap smears or colon screening. No    Reviewed record in preparation for visit and have necessary documentation  Pt did not bring medication to office visit for review  Patient is accompanied by self I have received verbal consent from Dianna Sherman to discuss any/all medical information while they are present in the room.     Goals that were addressed and/or need to be completed during or after this appointment include     Health Maintenance Due   Topic Date Due    COVID-19 Vaccine (1) Never done    DTaP/Tdap/Td series (1 - Tdap) Never done    Shingrix Vaccine Age 50> (1 of 2) Never done    Low dose CT lung screening  Never done    AAA Screening 73-69 YO Male Smoking Patients  Never done    Pneumococcal 65+ years (1 of 1 - PPSV23) Never done    Medicare Yearly Exam  08/06/2021    Flu Vaccine (1) Never done

## 2021-12-22 ENCOUNTER — OFFICE VISIT (OUTPATIENT)
Dept: FAMILY MEDICINE CLINIC | Age: 66
End: 2021-12-22

## 2021-12-22 VITALS
HEIGHT: 72 IN | TEMPERATURE: 98.7 F | RESPIRATION RATE: 16 BRPM | BODY MASS INDEX: 22.37 KG/M2 | SYSTOLIC BLOOD PRESSURE: 157 MMHG | WEIGHT: 165.2 LBS | DIASTOLIC BLOOD PRESSURE: 91 MMHG | HEART RATE: 76 BPM | OXYGEN SATURATION: 96 %

## 2021-12-22 DIAGNOSIS — F33.41 RECURRENT MAJOR DEPRESSIVE DISORDER, IN PARTIAL REMISSION (HCC): ICD-10-CM

## 2021-12-22 DIAGNOSIS — M51.36 DDD (DEGENERATIVE DISC DISEASE), LUMBAR: ICD-10-CM

## 2021-12-22 DIAGNOSIS — F51.01 PRIMARY INSOMNIA: ICD-10-CM
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
## 2021-12-22 DIAGNOSIS — I10 ESSENTIAL HYPERTENSION: ICD-10-CM

## 2021-12-22 DIAGNOSIS — F41.9 ANXIETY: ICD-10-CM

## 2021-12-22 DIAGNOSIS — F17.200 TOBACCO DEPENDENCE: ICD-10-CM

## 2021-12-22 DIAGNOSIS — Z00.00 MEDICARE ANNUAL WELLNESS VISIT, SUBSEQUENT: ICD-10-CM

## 2021-12-22 DIAGNOSIS — E78.5 HYPERLIPIDEMIA, UNSPECIFIED HYPERLIPIDEMIA TYPE: ICD-10-CM

## 2021-12-22 DIAGNOSIS — R07.89 ANTERIOR CHEST WALL PAIN: Primary | ICD-10-CM

## 2021-12-22 PROCEDURE — 99214 OFFICE O/P EST MOD 30 MIN: CPT | Performed by: FAMILY MEDICINE

## 2021-12-22 PROCEDURE — G0439 PPPS, SUBSEQ VISIT: HCPCS | Performed by: FAMILY MEDICINE

## 2021-12-22 PROCEDURE — G9717 DOC PT DX DEP/BP F/U NT REQ: HCPCS | Performed by: FAMILY MEDICINE

## 2021-12-22 PROCEDURE — 1101F PT FALLS ASSESS-DOCD LE1/YR: CPT | Performed by: FAMILY MEDICINE

## 2021-12-22 PROCEDURE — G8420 CALC BMI NORM PARAMETERS: HCPCS | Performed by: FAMILY MEDICINE

## 2021-12-22 PROCEDURE — G8427 DOCREV CUR MEDS BY ELIG CLIN: HCPCS | Performed by: FAMILY MEDICINE

## 2021-12-22 PROCEDURE — 3017F COLORECTAL CA SCREEN DOC REV: CPT | Performed by: FAMILY MEDICINE

## 2021-12-22 PROCEDURE — G8536 NO DOC ELDER MAL SCRN: HCPCS | Performed by: FAMILY MEDICINE

## 2021-12-22 RX ORDER — DIAZEPAM 5 MG/1
TABLET ORAL
Qty: 30 TABLET | Refills: 0 | Status: SHIPPED | OUTPATIENT
Start: 2021-12-22 | End: 2022-03-01

## 2021-12-22 RX ORDER — PRAVASTATIN SODIUM 40 MG/1
TABLET ORAL
Qty: 90 TABLET | Refills: 1 | Status: SHIPPED | OUTPATIENT
Start: 2021-12-22 | End: 2022-06-22 | Stop reason: SDUPTHER

## 2021-12-22 RX ORDER — TRAZODONE HYDROCHLORIDE 150 MG/1
150 TABLET ORAL
Qty: 90 TABLET | Refills: 0 | Status: SHIPPED | OUTPATIENT
Start: 2021-12-22 | End: 2022-03-29 | Stop reason: SDUPTHER

## 2021-12-22 RX ORDER — DULOXETIN HYDROCHLORIDE 30 MG/1
CAPSULE, DELAYED RELEASE ORAL
Qty: 90 CAPSULE | Refills: 1 | Status: SHIPPED | OUTPATIENT
Start: 2021-12-22 | End: 2022-06-27

## 2021-12-22 RX ORDER — TRAMADOL HYDROCHLORIDE 50 MG/1
50 TABLET ORAL
Qty: 28 TABLET | Refills: 0 | Status: SHIPPED | OUTPATIENT
Start: 2021-12-22 | End: 2021-12-29

## 2021-12-22 RX ORDER — BUPROPION HYDROCHLORIDE 150 MG/1
150 TABLET ORAL DAILY
Qty: 90 TABLET | Refills: 1 | Status: SHIPPED | OUTPATIENT
Start: 2021-12-22 | End: 2022-09-27 | Stop reason: SDUPTHER

## 2021-12-22 NOTE — PROGRESS NOTES
1. Have you been to the ER, urgent care clinic since your last visit? Hospitalized since your last visit? No    2. Have you seen or consulted any other health care providers outside of the [address] [place] [address] since your last visit? Include any pap smears or colon screening. No    Reviewed record in preparation for visit and have necessary documentation  Pt did not bring medication to office visit for review  Patient is accompanied by self I have received verbal consent from Caren Ayala to discuss any/all medical information while they are present in the room.     Goals that were addressed and/or need to be completed during or after this appointment include     Health Maintenance Due   Topic Date Due    COVID-19 Vaccine (1) Never done    DTaP/Tdap/Td series (1 - Tdap) Never done    Shingrix Vaccine Age 50> (1 of 2) Never done    Low dose CT lung screening  Never done    AAA Screening 73-69 YO Male Smoking Patients  Never done    Pneumococcal 65+ years (1 of 1 - PPSV23) Never done    Medicare Yearly Exam  08/06/2021    Flu Vaccine (1) Never done

## 2021-12-26 NOTE — PROGRESS NOTES
Chief Complaint   Patient presents with    Follow-up     he is a 74y.o. year old male who presents for follow up of his chronic health conditions. Patient with hx of COPD, insomnia, depression, HLD and chronic LBP. He reports anterior chest wall pain. Says he fell and injured self. Patient denies HA, dizziness, SOB, abdominal pain, dysuria, acute weakness or paresthesia. BP Readings from Last 3 Encounters:   12/22/21 (!) 157/91   12/15/21 (!) 168/97   09/14/21 (!) 165/90     Wt Readings from Last 3 Encounters:   12/22/21 165 lb 3.2 oz (74.9 kg)   12/15/21 164 lb (74.4 kg)   09/14/21 158 lb 3.2 oz (71.8 kg)     Body mass index is 22.41 kg/m². Hyperlipidemia    Cardiovascular risks for him are: hyperlipidemia, smoker. Currently he takes Pravachol (pravastatin)     Lab Results   Component Value Date/Time    Cholesterol, total 185 08/31/2021 09:40 AM    HDL Cholesterol 53 08/31/2021 09:40 AM    LDL, calculated 114.6 (H) 08/31/2021 09:40 AM    Triglyceride 87 08/31/2021 09:40 AM    CHOL/HDL Ratio 3.5 08/31/2021 09:40 AM     Lab Results   Component Value Date/Time    Sodium 139 08/31/2021 09:40 AM    Potassium 5.1 08/31/2021 09:40 AM    Chloride 109 (H) 08/31/2021 09:40 AM    CO2 27 08/31/2021 09:40 AM    Anion gap 3 (L) 08/31/2021 09:40 AM    Glucose 85 08/31/2021 09:40 AM    BUN 21 (H) 08/31/2021 09:40 AM    Creatinine 1.03 08/31/2021 09:40 AM    BUN/Creatinine ratio 20 08/31/2021 09:40 AM    GFR est AA >60 08/31/2021 09:40 AM    GFR est non-AA >60 08/31/2021 09:40 AM    Calcium 9.6 08/31/2021 09:40 AM    Bilirubin, total 0.5 08/31/2021 09:40 AM    ALT (SGPT) 29 08/31/2021 09:40 AM    Alk.  phosphatase 105 08/31/2021 09:40 AM    Protein, total 7.4 08/31/2021 09:40 AM    Albumin 4.2 08/31/2021 09:40 AM    Globulin 3.2 08/31/2021 09:40 AM    A-G Ratio 1.3 08/31/2021 09:40 AM      Our goal is to lower hyperlipidemia to decrease the risks of strokes and heart attacks      Patient Active Problem List   Diagnosis Code    Hyperlipemia E78.5    Insomnia G47.00    History of left hip replacement Z96.642    Depression F32. A    Tobacco abuse Z72.0    Conductive hearing loss, bilateral H90.0    Chronic obstructive pulmonary disease (HCC) J44.9     Past Surgical History:   Procedure Laterality Date    COLONOSCOPY N/A 8/23/2016    COLON[facility]choa [facility] at [facility]COPY    COLONOSCOPY N/A 9/18/2019    COLON[facility]uevara Davis MD at [facility] [address] HX COLONOSCOPY      HX GI      surgery for hemorrhoids    HX HEENT      all teeth removed    HX ORTHOPAEDIC      left hip replacement    HX ORTHOPAEDIC      infection after sutured finger/then healed/then had a growth removed 2 1/2 yrs later - benign    HX OTHER SURGICAL      growth on left side of testicle removed - benign     Social History     Socioeconomic History    Marital status: SINGLE     Spouse name: Not on file    Number of children: Not on file    Years of education: Not on file    Highest education level: Not on file   Occupational History    Not on file   Tobacco Use    Smoking status: Current Every Day Smoker     Packs/day: 0.50     Years: 45.00     Pack years: 22.50     Types: Cigarettes    Smokeless tobacco: Never Used   Substance and Sexual Activity    Alcohol use: No    Drug use: No    Sexual activity: Never   Other Topics Concern    Not on file   Social History Narrative    Not on file     Social Determinants of Health     Financial Resource Strain:     Difficulty of Paying Living Expenses: Not on file   Food Insecurity:     Worried About Running Out of Food in the Last Year: Not on file    Ashley of Food in the Last Year: Not on file   Transportation Needs:     Lack of Transportation (Medical): Not on file    Lack of Transportation (Non-Medical):  Not on file   Physical Activity:     Days of Exercise per Week: Not on file    Minutes of Exercise per Session: Not on file   Stress:     Feeling of Stress : Not on file   Social Connections:     Frequency of Communication with Friends and Family: Not on file    Frequency of Social Gatherings with Friends and Family: Not on file    Attends [employer] Services: Not on file    Active Member of [address] [place] [address] [employer] or Organizations: Not on file    Attends Club or Organization Meetings: Not on file    Marital Status: Not on file   Intimate Partner Violence:     Fear of Current or Ex-Partner: Not on file    Emotionally Abused: Not on file    Physically Abused: Not on file    Sexually Abused: Not on file   Housing Stability:     Unable to Pay for Housing in the Last Year: Not on file    Number of [place] in the Last Year: Not on file    Unstable Housing in the Last Year: Not on file     Family History   Problem Relation Age of Onset    Cancer Mother         breast    Stroke Mother         x2    Cancer Father         throat/lung    Heart Disease Father     Lung Disease Father     Thyroid Disease Father     Heart Attack Father     Suicide Brother     Psychiatric Disorder Brother     Heart Attack Paternal Uncle     Heart Attack Paternal Uncle         x3     Current Outpatient Medications   Medication Sig    traMADoL (ULTRAM) 50 mg tablet Take 1 Tablet by mouth every six (6) hours as needed for Pain for up to 7 days. Max Daily Amount: 200 mg.  traZODone (DESYREL) 150 mg tablet Take 1 Tablet by mouth nightly.  pravastatin (PRAVACHOL) 40 mg tablet TAKE ONE TABLET BY MOUTH EVERY NIGHT    diazePAM (VALIUM) 5 mg tablet TAKE ONE TABLET BY MOUTH EVERY TWELVE HOURS AS NEEDED FOR ANXIETY. max daily AMOUNT: 10 MG    buPROPion XL (WELLBUTRIN XL) 150 mg tablet Take 1 Tablet by mouth daily.  DULoxetine (CYMBALTA) 30 mg capsule TAKE ONE CAPSULE BY MOUTH ONCE A DAY    tiZANidine (ZANAFLEX) 4 mg tablet TAKE ONE TABLET BY MOUTH TWICE DAILY    losartan (COZAAR) 50 mg tablet Take 1 Tablet by mouth daily.  Indications: high blood pressure    diclofenac EC (VOLTAREN) 75 mg EC tablet Take 1 Tablet by mouth two (2) times a day.  tamsulosin (FLOMAX) 0.4 mg capsule TAKE TWO CAPSULES BY MOUTH DAILY    omeprazole (PRILOSEC) 20 mg capsule TAKE ONE CAPSULE BY MOUTH ONCE A DAY    montelukast (SINGULAIR) 10 mg tablet TAKE ONE TABLET BY MOUTH EVERY DAY    Ventolin HFA 90 mcg/actuation inhaler inhale 2 PUFFS by mouth EVERY 6 HOURS AS NEEDED FOR WHEEZING (Patient not taking: Reported on 12/15/2021)     No current facility-administered medications for this visit. Allergies   Allergen Reactions    Percocet [Oxycodone-Acetaminophen] Rash and Itching     itching       Review of Systems:  Constitutional: Negative for fatigue, malaise  Derm: Negative for rash or lesion  HEENT: Negative for acute hearing changes  Resp: Negative for cough, wheezing or SOB  CV: Negative for dizziness or palpitations  Gastrointestinal: Negative for nausea or abdominal pain  Genital/urinary: frequent urination  MS: see HPI, hx of LBP  Neuro: Negative for HA, weakness or paresthesia  Psych: History of anxiety, depression and insomnia     Vitals:    12/22/21 0807   BP: (!) 157/91   Pulse: 76   Resp: 16   Temp: 98.7 °F (37.1 °C)   TempSrc: Oral   SpO2: 96%   Weight: 165 lb 3.2 oz (74.9 kg)   Height: 6' (1.829 m)       Physical Examination:  General: thin, appears distressed  Head: Normocephalic, atraumatic  Eyes: Sclera clear, EOMI  Neck: Normal range of motion  Respiratory: CTAB with symmetrical, unlabored effort  Cardiovascular: Normal S1, S2, Regular rate and rhythm  Extremities: antalgic gait, ambulates with cane  Neurologic: Normal strength and sensation, no focal deficits  Psych: labile affect: agitation, anger, tears     Diagnoses and all orders for this visit:    1. Anterior chest wall pain  -     XR CHEST PA LAT; Future  -     traMADoL (ULTRAM) 50 mg tablet; Take 1 Tablet by mouth every six (6) hours as needed for Pain for up to 7 days.  Max Daily Amount: 200 mg.    2. Fall, initial encounter  -     XR CHEST PA LAT; Future    3. Essential hypertension    4. Primary insomnia  -     traZODone (DESYREL) 150 mg tablet; Take 1 Tablet by mouth nightly. 5. Hyperlipidemia, unspecified hyperlipidemia type  -     pravastatin (PRAVACHOL) 40 mg tablet; TAKE ONE TABLET BY MOUTH EVERY NIGHT    6. DDD (degenerative disc disease), lumbar  -     traMADoL (ULTRAM) 50 mg tablet; Take 1 Tablet by mouth every six (6) hours as needed for Pain for up to 7 days. Max Daily Amount: 200 mg.  -     diazePAM (VALIUM) 5 mg tablet; TAKE ONE TABLET BY MOUTH EVERY TWELVE HOURS AS NEEDED FOR ANXIETY. max daily AMOUNT: 10 MG  -     DULoxetine (CYMBALTA) 30 mg capsule; TAKE ONE CAPSULE BY MOUTH ONCE A DAY    7. Anxiety  -     diazePAM (VALIUM) 5 mg tablet; TAKE ONE TABLET BY MOUTH EVERY TWELVE HOURS AS NEEDED FOR ANXIETY. max daily AMOUNT: 10 MG    8. Recurrent major depressive disorder, in partial remission (HCC)  -     buPROPion XL (WELLBUTRIN XL) 150 mg tablet; Take 1 Tablet by mouth daily.  -     DULoxetine (CYMBALTA) 30 mg capsule; TAKE ONE CAPSULE BY MOUTH ONCE A DAY    9. Tobacco dependence      Plan of care:  Diagnoses were discussed in detail with patient. Medications reviewed and appropriate. Patient to continue current prescribed medications as written. Medication risks/benefits/side effects discussed with patient. All of the patient's questions were addressed and answered to apparent satisfaction. The patient understands and agrees with our plan of care. The patient knows to call back if they have questions about the plan of care or if symptoms change. The patient received an After-Visit Summary which contains VS, diagnoses, orders, allergy and medication lists. Future Appointments   Date Time Provider Marzena Chopra   3/17/2022  8:00 AM Gene Ramirez MD [facility]       Follow-up and Dispositions    · Return in about 3 months (around 3/22/2022).        The following Annual Medicare Wellness Exam is distinct and separate from the medical evaluation and decision making. This is the Subsequent Medicare Annual Wellness Exam, performed 12 months or more after the Initial AWV or the last Subsequent AWV    I have reviewed the patient's medical history in detail and updated the computerized patient record. Assessment/Plan   Education and counseling provided:  Are appropriate based on today's review and evaluation  End-of-Life planning (with patient's consent)    1. Medicare annual wellness visit, subsequent       Depression Risk Factor Screening     3 most recent PHQ Screens 9/14/2021   PHQ Not Done -   Little interest or pleasure in doing things Several days   Feeling down, depressed, irritable, or hopeless Several days   Total Score PHQ 2 2   Trouble falling or staying asleep, or sleeping too much -   Feeling tired or having little energy -   Poor appetite, weight loss, or overeating -   Feeling bad about yourself - or that you are a failure or have let yourself or your family down -   Trouble concentrating on things such as school, work, reading, or watching TV -   Moving or speaking so slowly that other people could have noticed; or the opposite being so fidgety that others notice -   Thoughts of being better off dead, or hurting yourself in some way -   PHQ 9 Score -   How difficult have these problems made it for you to do your work, take care of your home and get along with others -       Alcohol & Drug Abuse Risk Screen    Do you average more than 1 drink per night or more than 7 drinks a week: No    In the past three months have you have had more than 4 drinks containing alcohol on one occasion: No          Functional Ability and Level of Safety    Hearing: Hearing is good. Activities of Daily Living: The home contains: no safety equipment.   Patient does total self care      Ambulation: with mild difficulty     Fall Risk:  Fall Risk Assessment, last 12 mths 12/22/2021   Able to walk? Yes   Fall in past 12 months? 1   Do you feel unsteady? -   Are you worried about falling -   Is the gait abnormal? -   Number of falls in past 12 months 1   Fall with injury? 1      Abuse Screen:  Patient is not abused       Cognitive Screening    Has your family/caregiver stated any concerns about your memory: no       Health Maintenance Due     Health Maintenance Due   Topic Date Due    COVID-19 Vaccine (1) Never done    DTaP/Tdap/Td series (1 - Tdap) Never done    Shingrix Vaccine Age 50> (1 of 2) Never done    Low dose CT lung screening  Never done    AAA Screening 73-67 YO Male Smoking Patients  Never done    Pneumococcal 65+ years (1 of 1 - PPSV23) Never done    Medicare Yearly Exam  08/06/2021    Flu Vaccine (1) Never done       Patient Care Team   Patient Care Team:  Cally Valencia MD as PCP - General (Family Medicine)  Cally Valencia MD as PCP - [facility] Provider  Keila Hester MD (Orthopedic Surgery)  Blayne Hopkins MD (Neurosurgery)  Martínez Rosado MD (Orthopedic Surgery)  Marie Stacy MD (Urology)    History     Patient Active Problem List   Diagnosis Code    Hyperlipemia E78.5    Insomnia G47.00    History of left hip replacement Z96.642    Depression F32. A    Tobacco abuse Z72.0    Conductive hearing loss, bilateral H90.0    Chronic obstructive pulmonary disease (HCC) J44.9     Past Medical History:   Diagnosis Date    Adverse effect of anesthesia     \"major fear of needles\"/raw feeling down throat    Arthritis     osteo    Chronic obstructive pulmonary disease (HCC)     Chronic pain     lower back - stenosis - had injections/steroids    Hypercholesterolemia     Ill-defined condition     low BP but not a problem    Psychiatric disorder     depression      Past Surgical History:   Procedure Laterality Date    COLONOSCOPY N/A 8/23/2016    COLONOSCOPY performed by Ricardo Bauman MD at [address] COLONOSCOPY N/A 9/18/2019    COLONOS[facility] by Amarilis Mendes MD at [facility] ENDOSCOPY    HX COLONOSCOPY      HX GI      surgery for hemorrhoids    HX HEENT      all teeth removed    HX ORTHOPAEDIC      left hip replacement    HX ORTHOPAEDIC      infection after sutured finger/then healed/then had a growth removed 2 1/2 yrs later - benign    HX OTHER SURGICAL      growth on left side of testicle removed - benign     Current Outpatient Medications   Medication Sig Dispense Refill    traMADoL (ULTRAM) 50 mg tablet Take 1 Tablet by mouth every six (6) hours as needed for Pain for up to 7 days. Max Daily Amount: 200 mg. 28 Tablet 0    traZODone (DESYREL) 150 mg tablet Take 1 Tablet by mouth nightly. 90 Tablet 0    pravastatin (PRAVACHOL) 40 mg tablet TAKE ONE TABLET BY MOUTH EVERY NIGHT 90 Tablet 1    diazePAM (VALIUM) 5 mg tablet TAKE ONE TABLET BY MOUTH EVERY TWELVE HOURS AS NEEDED FOR ANXIETY. max daily AMOUNT: 10 MG 30 Tablet 0    buPROPion XL (WELLBUTRIN XL) 150 mg tablet Take 1 Tablet by mouth daily. 90 Tablet 1    DULoxetine (CYMBALTA) 30 mg capsule TAKE ONE CAPSULE BY MOUTH ONCE A DAY 90 Capsule 1    tiZANidine (ZANAFLEX) 4 mg tablet TAKE ONE TABLET BY MOUTH TWICE DAILY 180 Tablet 0    losartan (COZAAR) 50 mg tablet Take 1 Tablet by mouth daily. Indications: high blood pressure 90 Tablet 1    diclofenac EC (VOLTAREN) 75 mg EC tablet Take 1 Tablet by mouth two (2) times a day.  180 Tablet 0    tamsulosin (FLOMAX) 0.4 mg capsule TAKE TWO CAPSULES BY MOUTH DAILY 180 Capsule 0    omeprazole (PRILOSEC) 20 mg capsule TAKE ONE CAPSULE BY MOUTH ONCE A DAY 90 Capsule 1    montelukast (SINGULAIR) 10 mg tablet TAKE ONE TABLET BY MOUTH EVERY DAY 90 Tablet 1    Ventolin HFA 90 mcg/actuation inhaler inhale 2 PUFFS by mouth EVERY 6 HOURS AS NEEDED FOR WHEEZING (Patient not taking: Reported on 12/15/2021) 18 g 2     Allergies   Allergen Reactions    Percocet [Oxycodone-Acetaminophen] Rash and Itching     itching       Family History   Problem Relation Age of Onset    Cancer Mother         breast    Stroke Mother         x2    Cancer Father         throat/lung    Heart Disease Father     Lung Disease Father     Thyroid Disease Father     Heart Attack Father     Suicide Brother     Psychiatric Disorder Brother     Heart Attack Paternal Uncle     Heart Attack Paternal Uncle         x3     Social History     Tobacco Use    Smoking status: Current Every Day Smoker     Packs/day: 0.50     Years: 45.00     Pack years: 22.50     Types: Cigarettes    Smokeless tobacco: Never Used   Substance Use Topics    Alcohol use: Saima López MD

## 2021-12-26 NOTE — ACP (ADVANCE CARE PLANNING)
Advance directives discussed. Printed information and form provided. Patient declined completion of advance directive form at this time.

## 2021-12-26 NOTE — PATIENT INSTRUCTIONS
Medicare Wellness Visit, Male    The best way to live healthy is to have a lifestyle where you eat a well-balanced diet, exercise regularly, limit alcohol use, and quit all forms of tobacco/nicotine, if applicable. Regular preventive services are another way to keep healthy. Preventive services (vaccines, screening tests, monitoring & exams) can help personalize your care plan, which helps you manage your own care. Screening tests can find health problems at the earliest stages, when they are easiest to treat. [facility] follows the current, evidence-based guidelines published by the [facility] R[facility] Kanchan ([facility]) when recommending preventive services for our patients. Because we follow these guidelines, sometimes recommendations change over time as research supports it. (For example, a prostate screening blood test is no longer routinely recommended for men with no symptoms). Of course, you and your doctor may decide to screen more often for some diseases, based on your risk and co-morbidities (chronic disease you are already diagnosed with). Preventive services for you include:  - Medicare offers their members a free annual wellness visit, which is time for you and your primary care provider to discuss and plan for your preventive service needs. Take advantage of this benefit every year!  -All adults over age 72 should receive the recommended pneumonia vaccines. Current USPSTF guidelines recommend a series of two vaccines for the best pneumonia protection.   -All adults should have a flu vaccine yearly and tetanus vaccine every 10 years.  -All adults age 48 and older should receive the shingles vaccines (series of two vaccines).        -All adults age 38-68 who are overweight should have a diabetes screening test once every three years.   -Other screening tests & preventive services for persons with diabetes include: an eye exam to screen for diabetic retinopathy, a kidney function test, a foot exam, and stricter control over your cholesterol.   -Cardiovascular screening for adults with routine risk involves an electrocardiogram (ECG) at intervals determined by the provider.   -Colorectal cancer screening should be done for adults age 54-65 with no increased risk factors for colorectal cancer. There are a number of acceptable methods of screening for this type of cancer. Each test has its own benefits and drawbacks. Discuss with your provider what is most appropriate for you during your annual wellness visit. The different tests include: colonoscopy (considered the best screening method), a fecal occult blood test, a fecal DNA test, and sigmoidoscopy.  -All adults born between [facility] should be screened once for Hepatitis C.  -An Abdominal Aortic Aneurysm (AAA) Screening is recommended for men age 73-68 who has ever smoked in their lifetime.      Here is a list of your current Health Maintenance items (your personalized list of preventive services) with a due date:  Health Maintenance Due   Topic Date Due    COVID-19 Vaccine (1) Never done    DTaP/Tdap/Td  (1 - Tdap) Never done    Shingles Vaccine (1 of 2) Never done    Smoker or Former Smoker - [address]  Never done    AAA Screening  Never done    Pneumococcal Vaccine (1 of 1 - PPSV23) Never done    Annual Well Visit  08/06/2021    Yearly Flu Vaccine (1) Never done

## 2022-03-17 ENCOUNTER — OFFICE VISIT (OUTPATIENT)
Dept: FAMILY MEDICINE CLINIC | Age: 67
End: 2022-03-17
Payer: MEDICARE

## 2022-03-17 VITALS
TEMPERATURE: 98.8 F | WEIGHT: 156 LBS | HEART RATE: 82 BPM | HEIGHT: 72 IN | OXYGEN SATURATION: 95 % | RESPIRATION RATE: 18 BRPM | DIASTOLIC BLOOD PRESSURE: 75 MMHG | SYSTOLIC BLOOD PRESSURE: 158 MMHG | BODY MASS INDEX: 21.13 KG/M2

## 2022-03-17 DIAGNOSIS — J44.9 CHRONIC OBSTRUCTIVE PULMONARY DISEASE, UNSPECIFIED COPD TYPE (HCC): ICD-10-CM

## 2022-03-17 DIAGNOSIS — I10 ESSENTIAL HYPERTENSION: Primary | ICD-10-CM

## 2022-03-17 DIAGNOSIS — F51.01 PRIMARY INSOMNIA: ICD-10-CM

## 2022-03-17 DIAGNOSIS — E78.5 HYPERLIPIDEMIA, UNSPECIFIED HYPERLIPIDEMIA TYPE: ICD-10-CM

## 2022-03-17 DIAGNOSIS — F33.41 RECURRENT MAJOR DEPRESSIVE DISORDER, IN PARTIAL REMISSION (HCC): ICD-10-CM

## 2022-03-17 LAB
ALBUMIN UR QL STRIP: 30 MG/L
BILIRUB UR QL STRIP: NORMAL
CREATININE, URINE POC: 200 MG/DL
GLUCOSE UR-MCNC: NEGATIVE MG/DL
KETONES P FAST UR STRIP-MCNC: NEGATIVE MG/DL
MICROALBUMIN/CREAT RATIO POC: <30 MG/G
PH UR STRIP: 5 [PH] (ref 4.6–8)
PROT UR QL STRIP: NEGATIVE
SP GR UR STRIP: 1.03 (ref 1–1.03)
UA UROBILINOGEN AMB POC: NORMAL (ref 0.2–1)
URINALYSIS CLARITY POC: CLEAR
URINALYSIS COLOR POC: NORMAL
URINE BLOOD POC: NEGATIVE
URINE LEUKOCYTES POC: NEGATIVE
URINE NITRITES POC: NEGATIVE

## 2022-03-17 PROCEDURE — G8754 DIAS BP LESS 90: HCPCS | Performed by: FAMILY MEDICINE

## 2022-03-17 PROCEDURE — 81001 URINALYSIS AUTO W/SCOPE: CPT | Performed by: FAMILY MEDICINE

## 2022-03-17 PROCEDURE — 99214 OFFICE O/P EST MOD 30 MIN: CPT | Performed by: FAMILY MEDICINE

## 2022-03-17 PROCEDURE — 1101F PT FALLS ASSESS-DOCD LE1/YR: CPT | Performed by: FAMILY MEDICINE

## 2022-03-17 PROCEDURE — G8753 SYS BP > OR = 140: HCPCS | Performed by: FAMILY MEDICINE

## 2022-03-17 PROCEDURE — G8420 CALC BMI NORM PARAMETERS: HCPCS | Performed by: FAMILY MEDICINE

## 2022-03-17 PROCEDURE — 82044 UR ALBUMIN SEMIQUANTITATIVE: CPT | Performed by: FAMILY MEDICINE

## 2022-03-17 PROCEDURE — G8536 NO DOC ELDER MAL SCRN: HCPCS | Performed by: FAMILY MEDICINE

## 2022-03-17 PROCEDURE — G9717 DOC PT DX DEP/BP F/U NT REQ: HCPCS | Performed by: FAMILY MEDICINE

## 2022-03-17 PROCEDURE — 3017F COLORECTAL CA SCREEN DOC REV: CPT | Performed by: FAMILY MEDICINE

## 2022-03-17 PROCEDURE — G8427 DOCREV CUR MEDS BY ELIG CLIN: HCPCS | Performed by: FAMILY MEDICINE

## 2022-03-17 NOTE — PROGRESS NOTES
1. Have you been to the ER, urgent care clinic since your last visit? Hospitalized since your last visit? No    2. Have you seen or consulted any other health care providers outside of the [address] [place] [address] since your last visit? Include any pap smears or colon screening. No    3. For patients aged 39-70: Has the patient had a colonoscopy / FIT/ Cologuard? Yes had had one in the past     If the patient is female:    4. For patients aged 41-77: Has the patient had a mammogram within the past 2 years? NA - based on age or sex      11. For patients aged 21-65: Has the patient had a pap smear? NA - based on age or sex     Reviewed record in preparation for visit and have necessary documentation  Pt did not bring medication to office visit for review  Patient is accompanied by self I have received verbal consent from Heidi Andrew to discuss any/all medical information while they are present in the room.     Goals that were addressed and/or need to be completed during or after this appointment include     Health Maintenance Due   Topic Date Due    COVID-19 Vaccine (1) Never done    DTaP/Tdap/Td series (1 - Tdap) Never done    Shingrix Vaccine Age 50> (1 of 2) Never done    Low dose CT lung screening  Never done    AAA Screening 73-67 YO Male Smoking Patients  Never done    Pneumococcal 65+ years (1 of 1 - PPSV23) Never done    Flu Vaccine (1) Never done

## 2022-03-17 NOTE — PROGRESS NOTES
Chief Complaint   Patient presents with    Follow Up Chronic Condition     complaining of pain in bladder area      he is a 74y.o. year old male who presents for follow up of his chronic health conditions. Patient with hx of COPD, insomnia, depression, HLD and chronic LBP. He complains of suprapubic pain/pressure. Patient denies HA, dizziness, SOB, abdominal pain, acute myalgias or arthralgias. BP Readings from Last 3 Encounters:   03/17/22 (!) 158/75   12/22/21 (!) 157/91   12/15/21 (!) 168/97     Wt Readings from Last 3 Encounters:   03/17/22 156 lb (70.8 kg)   12/22/21 165 lb 3.2 oz (74.9 kg)   12/15/21 164 lb (74.4 kg)     Body mass index is 21.16 kg/m². Hyperlipidemia    Cardiovascular risks for him are: hyperlipidemia, smoker. Currently he takes Pravachol (pravastatin)     Lab Results   Component Value Date/Time    Cholesterol, total 179 03/17/2022 12:00 AM    HDL Cholesterol 46 03/17/2022 12:00 AM    LDL, calculated 118 (H) 03/17/2022 12:00 AM    LDL, calculated 114.6 (H) 08/31/2021 09:40 AM    Triglyceride 83 03/17/2022 12:00 AM    CHOL/HDL Ratio 3.5 08/31/2021 09:40 AM     Lab Results   Component Value Date/Time    Sodium 142 03/17/2022 12:00 AM    Potassium 4.9 03/17/2022 12:00 AM    Chloride 106 03/17/2022 12:00 AM    CO2 17 (L) 03/17/2022 12:00 AM    Anion gap 3 (L) 08/31/2021 09:40 AM    Glucose 95 03/17/2022 12:00 AM    BUN 23 03/17/2022 12:00 AM    Creatinine 1.27 03/17/2022 12:00 AM    BUN/Creatinine ratio 18 03/17/2022 12:00 AM    GFR est AA >60 08/31/2021 09:40 AM    GFR est non-AA >60 08/31/2021 09:40 AM    Calcium 10.0 03/17/2022 12:00 AM    Bilirubin, total 0.8 03/17/2022 12:00 AM    ALT (SGPT) 12 03/17/2022 12:00 AM    Alk.  phosphatase 105 03/17/2022 12:00 AM    Protein, total 7.3 03/17/2022 12:00 AM    Albumin 4.9 (H) 03/17/2022 12:00 AM    Globulin 3.2 08/31/2021 09:40 AM    A-G Ratio 2.0 03/17/2022 12:00 AM      Our goal is to lower hyperlipidemia to decrease the risks of strokes and heart attacks      Patient Active Problem List   Diagnosis Code    Hyperlipemia E78.5    Insomnia G47.00    History of left hip replacement Z96.642    Depression F32. A    Tobacco abuse Z72.0    Conductive hearing loss, bilateral H90.0    Chronic obstructive pulmonary disease (HCC) J44.9     Past Surgical History:   Procedure Laterality Date    COLONOSCOPY N/A 8/23/2016    COLONOSCOPY performed by Sheri Arguelles MD at [facility] ENDOSCOPY    COLONOSCOPY N/A 9/18/2019    COLONOSCOPY performed by Doris Ferreira MD at [facility] [address] HX COLONOSCOPY      HX GI      surgery for hemorrhoids    HX HEENT      all teeth removed    HX ORTHOPAEDIC      left hip replacement    HX ORTHOPAEDIC      infection after sutured finger/then healed/then had a growth removed 2 1/2 yrs later - benign    HX OTHER SURGICAL      growth on left side of testicle removed - benign     Social History     Socioeconomic History    Marital status: SINGLE     Spouse name: Not on file    Number of children: Not on file    Years of education: Not on file    Highest education level: Not on file   Occupational History    Not on file   Tobacco Use    Smoking status: Current Every Day Smoker     Packs/day: 0.50     Years: 45.00     Pack years: 22.50     Types: Cigarettes    Smokeless tobacco: Never Used   Substance and Sexual Activity    Alcohol use: No    Drug use: No    Sexual activity: Never   Other Topics Concern    Not on file   Social History Narrative    Not on file     Social Determinants of Health     Financial Resource Strain:     Difficulty of Paying Living Expenses: Not on file   Food Insecurity:     Worried About Running Out of Food in the Last Year: Not on file    Ashley of Food in the Last Year: Not on file   Transportation Needs:     Lack of Transportation (Medical): Not on file    Lack of Transportation (Non-Medical):  Not on file   Physical Activity:     Days of Exercise per Week: Not on file    Minutes of Exercise per Session: Not on file   Stress:     Feeling of Stress : Not on file   Social Connections:     Frequency of Communication with Friends and Family: Not on file    Frequency of Social Gatherings with Friends and Family: Not on file    Attends [employer] Services: Not on file    Active Member of [address] [place] [address] or Organizations: Not on file    Attends Club or Organization Meetings: Not on file    Marital Status: Not on file   Intimate Partner Violence:     Fear of Current or Ex-Partner: Not on file    Emotionally Abused: Not on file    Physically Abused: Not on file    Sexually Abused: Not on file   Housing Stability:     Unable to Pay for Housing in the Last Year: Not on file    Number of [place] in the Last Year: Not on file    Unstable Housing in the Last Year: Not on file     Family History   Problem Relation Age of Onset    Cancer Mother         breast    Stroke Mother         x2    Cancer Father         throat/lung    Heart Disease Father     Lung Disease Father     Thyroid Disease Father     Heart Attack Father     Suicide Brother     Psychiatric Disorder Brother     Heart Attack Paternal Uncle     Heart Attack Paternal Uncle         x3     Current Outpatient Medications   Medication Sig    diazePAM (VALIUM) 5 mg tablet TAKE ONE TABLET BY MOUTH EVERY TWELVE HOURS AS NEEDED FOR ANXIETY. max daily AMOUNT: 10 MG    omeprazole (PRILOSEC) 20 mg capsule TAKE ONE CAPSULE BY MOUTH ONCE A DAY    diclofenac EC (VOLTAREN) 75 mg EC tablet TAKE ONE TABLET BY MOUTH TWICE DAILY    tamsulosin (FLOMAX) 0.4 mg capsule TAKE TWO CAPSULES BY MOUTH DAILY    losartan (COZAAR) 50 mg tablet Take 1 Tablet by mouth daily. Indications: high blood pressure    montelukast (SINGULAIR) 10 mg tablet Take 1 Tablet by mouth daily.  tiZANidine (ZANAFLEX) 4 mg tablet Take 1 Tablet by mouth two (2) times a day.  traZODone (DESYREL) 150 mg tablet Take 1 Tablet by mouth nightly.     pravastatin (PRAVACHOL) 40 mg tablet TAKE ONE TABLET BY MOUTH EVERY NIGHT    buPROPion XL (WELLBUTRIN XL) 150 mg tablet Take 1 Tablet by mouth daily.  DULoxetine (CYMBALTA) 30 mg capsule TAKE ONE CAPSULE BY MOUTH ONCE A DAY    Ventolin HFA 90 mcg/actuation inhaler inhale 2 PUFFS by mouth EVERY 6 HOURS AS NEEDED FOR WHEEZING     No current facility-administered medications for this visit. Allergies   Allergen Reactions    Percocet [Oxycodone-Acetaminophen] Rash and Itching     itching       Review of Systems:  Constitutional: Negative for fatigue, malaise  Derm: Negative for rash or lesion  HEENT: Negative for acute hearing changes  Resp: Negative for cough, wheezing or SOB  CV: Negative for chest pain, dizziness or palpitations  Gastrointestinal: see HPI, Negative for nausea or abdominal pain  Genital/urinary: frequent urination  MS: hx of LBP  Neuro: Negative for HA, weakness or paresthesia  Psych: History of anxiety, depression and insomnia     Vitals:    03/17/22 0809 03/17/22 0823   BP: (!) 155/84 (!) 158/75   Pulse: 82    Resp: 18    Temp: 98.8 °F (37.1 °C)    TempSrc: Oral    SpO2: 95%    Weight: 156 lb (70.8 kg)    Height: 6' (1.829 m)        Physical Examination:  General: thin, appears distressed  Head: Normocephalic, atraumatic  Eyes: Sclera clear, EOMI  Neck: Normal range of motion  Respiratory: CTAB with symmetrical, unlabored effort  Cardiovascular: Normal S1, S2, Regular rate and rhythm  Extremities: antalgic gait, ambulates with cane  Neurologic: Normal strength and sensation, no focal deficits  Psych: animated affect    Diagnoses and all orders for this visit:    1. Essential hypertension  -     METABOLIC PANEL, COMPREHENSIVE; Future  -     TSH 3RD GENERATION; Future  -     HGB & HCT; Future  -     AMB POC URINALYSIS DIP STICK AUTO W/ MICRO  -     AMB POC URINE, MICROALBUMIN, SEMIQUANT (3 RESULTS)    2. Hyperlipidemia, unspecified hyperlipidemia type  -     LIPID PANEL;  Future  -     METABOLIC PANEL, COMPREHENSIVE; Future    3. Primary insomnia    4. Chronic obstructive pulmonary disease, unspecified COPD type ([facility] [address]    5. Recurrent major depressive disorder, in partial remission ([facility]      Plan of care:  Diagnoses were discussed in detail with patient. Medication risks/benefits/side effects discussed with patient. Importance of compliance with all prescribed medications discussed. All of the patient's questions were addressed and answered to apparent satisfaction. The patient understands and agrees with our plan of care. The patient knows to call back if they have questions about the plan of care or if symptoms change. The patient received an After-Visit Summary which contains VS, diagnoses, orders, allergy and medication lists. >40 minutes spent face to face with patient. Problems with Epic during encounter. Future Appointments   Date Time Provider Marzena Chopra   6/22/2022  8:20 AM Kaylee Godfrey MD [facility] BS AMB         Follow-up and Dispositions    · Return in about 3 months (around 6/17/2022), or if symptoms worsen or fail to improve.

## 2022-03-18 PROBLEM — H90.0 CONDUCTIVE HEARING LOSS, BILATERAL: Status: ACTIVE | Noted: 2018-07-25

## 2022-03-18 LAB
ALBUMIN SERPL-MCNC: 4.9 G/DL (ref 3.8–4.8)
ALBUMIN/GLOB SERPL: 2 {RATIO} (ref 1.2–2.2)
ALP SERPL-CCNC: 105 IU/L (ref 44–121)
ALT SERPL-CCNC: 12 IU/L (ref 0–44)
AST SERPL-CCNC: 21 IU/L (ref 0–40)
BILIRUB SERPL-MCNC: 0.8 MG/DL (ref 0–1.2)
BUN SERPL-MCNC: 23 MG/DL (ref 8–27)
BUN/CREAT SERPL: 18 (ref 10–24)
CALCIUM SERPL-MCNC: 10 MG/DL (ref 8.6–10.2)
CHLORIDE SERPL-SCNC: 106 MMOL/L (ref 96–106)
CHOLEST SERPL-MCNC: 179 MG/DL (ref 100–199)
CO2 SERPL-SCNC: 17 MMOL/L (ref 20–29)
CREAT SERPL-MCNC: 1.27 MG/DL (ref 0.76–1.27)
EGFR: 62 ML/MIN/1.73
GLOBULIN SER CALC-MCNC: 2.4 G/DL (ref 1.5–4.5)
GLUCOSE SERPL-MCNC: 95 MG/DL (ref 65–99)
HCT VFR BLD AUTO: 44.1 % (ref 37.5–51)
HDLC SERPL-MCNC: 46 MG/DL
HGB BLD-MCNC: 14.7 G/DL (ref 13–17.7)
LDLC SERPL CALC-MCNC: 118 MG/DL (ref 0–99)
POTASSIUM SERPL-SCNC: 4.9 MMOL/L (ref 3.5–5.2)
PROT SERPL-MCNC: 7.3 G/DL (ref 6–8.5)
SODIUM SERPL-SCNC: 142 MMOL/L (ref 134–144)
TRIGL SERPL-MCNC: 83 MG/DL (ref 0–149)
TSH SERPL DL<=0.005 MIU/L-ACNC: 1.83 UIU/ML (ref 0.45–4.5)
VLDLC SERPL CALC-MCNC: 15 MG/DL (ref 5–40)

## 2022-03-19 PROBLEM — J44.9 CHRONIC OBSTRUCTIVE PULMONARY DISEASE (HCC): Status: ACTIVE | Noted: 2021-03-16

## 2022-03-29 ENCOUNTER — TELEPHONE (OUTPATIENT)
Dept: FAMILY MEDICINE CLINIC | Age: 67
End: 2022-03-29

## 2022-03-30 NOTE — TELEPHONE ENCOUNTER
Attempted to call. No answer. Message left. Advise patient per Dr Lewis [place] is a yearly lab. Medicare will not approve this until September. His PSA has been stable for the past 3 years. \"

## 2022-03-30 NOTE — TELEPHONE ENCOUNTER
PSA is a yearly lab. Medicare will not approve this until September. His PSA has been stable for the past 3 years.

## 2022-06-07 RX ORDER — ALBUTEROL SULFATE 90 UG/1
AEROSOL, METERED RESPIRATORY (INHALATION)
Qty: 18 G | Refills: 0 | Status: SHIPPED | OUTPATIENT
Start: 2022-06-07

## 2022-06-22 ENCOUNTER — OFFICE VISIT (OUTPATIENT)
Dept: FAMILY MEDICINE CLINIC | Age: 67
End: 2022-06-22
Payer: MEDICARE

## 2022-06-22 VITALS
TEMPERATURE: 97.4 F | HEART RATE: 74 BPM | BODY MASS INDEX: 20.59 KG/M2 | RESPIRATION RATE: 16 BRPM | OXYGEN SATURATION: 96 % | WEIGHT: 152 LBS | HEIGHT: 72 IN | DIASTOLIC BLOOD PRESSURE: 71 MMHG | SYSTOLIC BLOOD PRESSURE: 122 MMHG

## 2022-06-22 DIAGNOSIS — M48.061 SPINAL STENOSIS OF LUMBAR REGION, UNSPECIFIED WHETHER NEUROGENIC CLAUDICATION PRESENT: ICD-10-CM

## 2022-06-22 DIAGNOSIS — E78.5 HYPERLIPIDEMIA, UNSPECIFIED HYPERLIPIDEMIA TYPE: ICD-10-CM

## 2022-06-22 DIAGNOSIS — J44.9 CHRONIC OBSTRUCTIVE PULMONARY DISEASE, UNSPECIFIED COPD TYPE (HCC): ICD-10-CM

## 2022-06-22 DIAGNOSIS — M25.552 LEFT HIP PAIN: ICD-10-CM

## 2022-06-22 DIAGNOSIS — R35.1 BENIGN PROSTATIC HYPERPLASIA WITH NOCTURIA: ICD-10-CM

## 2022-06-22 DIAGNOSIS — F51.01 PRIMARY INSOMNIA: ICD-10-CM

## 2022-06-22 DIAGNOSIS — F41.9 ANXIETY: ICD-10-CM

## 2022-06-22 DIAGNOSIS — F33.41 RECURRENT MAJOR DEPRESSIVE DISORDER, IN PARTIAL REMISSION (HCC): Primary | ICD-10-CM

## 2022-06-22 DIAGNOSIS — Z96.642 HISTORY OF LEFT HIP REPLACEMENT: ICD-10-CM

## 2022-06-22 DIAGNOSIS — M51.36 DDD (DEGENERATIVE DISC DISEASE), LUMBAR: ICD-10-CM

## 2022-06-22 DIAGNOSIS — K21.9 GASTROESOPHAGEAL REFLUX DISEASE: ICD-10-CM

## 2022-06-22 DIAGNOSIS — N40.1 BENIGN PROSTATIC HYPERPLASIA WITH NOCTURIA: ICD-10-CM

## 2022-06-22 DIAGNOSIS — F17.200 TOBACCO DEPENDENCE: ICD-10-CM

## 2022-06-22 DIAGNOSIS — I10 ESSENTIAL HYPERTENSION: ICD-10-CM

## 2022-06-22 PROCEDURE — G8536 NO DOC ELDER MAL SCRN: HCPCS | Performed by: FAMILY MEDICINE

## 2022-06-22 PROCEDURE — G8427 DOCREV CUR MEDS BY ELIG CLIN: HCPCS | Performed by: FAMILY MEDICINE

## 2022-06-22 PROCEDURE — G8420 CALC BMI NORM PARAMETERS: HCPCS | Performed by: FAMILY MEDICINE

## 2022-06-22 PROCEDURE — G9717 DOC PT DX DEP/BP F/U NT REQ: HCPCS | Performed by: FAMILY MEDICINE

## 2022-06-22 PROCEDURE — 3017F COLORECTAL CA SCREEN DOC REV: CPT | Performed by: FAMILY MEDICINE

## 2022-06-22 PROCEDURE — 1101F PT FALLS ASSESS-DOCD LE1/YR: CPT | Performed by: FAMILY MEDICINE

## 2022-06-22 PROCEDURE — G8752 SYS BP LESS 140: HCPCS | Performed by: FAMILY MEDICINE

## 2022-06-22 PROCEDURE — 99215 OFFICE O/P EST HI 40 MIN: CPT | Performed by: FAMILY MEDICINE

## 2022-06-22 PROCEDURE — 1123F ACP DISCUSS/DSCN MKR DOCD: CPT | Performed by: FAMILY MEDICINE

## 2022-06-22 PROCEDURE — G8754 DIAS BP LESS 90: HCPCS | Performed by: FAMILY MEDICINE

## 2022-06-22 RX ORDER — PRAVASTATIN SODIUM 40 MG/1
TABLET ORAL
Qty: 90 TABLET | Refills: 1 | Status: SHIPPED | OUTPATIENT
Start: 2022-06-22

## 2022-06-22 RX ORDER — LOSARTAN POTASSIUM 50 MG/1
50 TABLET ORAL DAILY
Qty: 90 TABLET | Refills: 1 | Status: SHIPPED | OUTPATIENT
Start: 2022-06-22

## 2022-06-22 RX ORDER — TIZANIDINE 4 MG/1
4 TABLET ORAL 2 TIMES DAILY
Qty: 180 TABLET | Refills: 0 | Status: SHIPPED | OUTPATIENT
Start: 2022-06-22 | End: 2022-09-27 | Stop reason: SDUPTHER

## 2022-06-22 RX ORDER — TAMSULOSIN HYDROCHLORIDE 0.4 MG/1
CAPSULE ORAL
Qty: 180 CAPSULE | Refills: 1 | Status: SHIPPED | OUTPATIENT
Start: 2022-06-22

## 2022-06-22 RX ORDER — OMEPRAZOLE 20 MG/1
CAPSULE, DELAYED RELEASE ORAL
Qty: 90 CAPSULE | Refills: 1 | Status: SHIPPED | OUTPATIENT
Start: 2022-06-22 | End: 2022-09-23

## 2022-06-22 NOTE — PROGRESS NOTES
1. Have you been to the ER, urgent care clinic since your last visit? Hospitalized since your last visit? No    2. Have you seen or consulted any other health care providers outside of the [address] [place] [address] since your last visit? Include any pap smears or colon screening. No    3. For patients aged 39-70: Has the patient had a colonoscopy / FIT/ Cologuard? Yes     If the patient is female:    4. For patients aged 41-77: Has the patient had a mammogram within the past 2 years? NA - based on age or sex      11. For patients aged 21-65: Has the patient had a pap smear? NA - based on age or sex     Reviewed record in preparation for visit and have necessary documentation  Pt did not bring medication to office visit for review  Patient is accompanied by self I have received verbal consent from Robert Dunaway to discuss any/all medical information while they are present in the room.     Goals that were addressed and/or need to be completed during or after this appointment include     Health Maintenance Due   Topic Date Due    COVID-19 Vaccine (1) Never done    Pneumococcal 65+ years (1 - PCV) Never done    Low dose CT lung screening  Never done    AAA Screening 73-69 YO Male Smoking Patients  Never done

## 2022-06-22 NOTE — PROGRESS NOTES
Chief Complaint   Patient presents with    Follow-up     3 month f/u     he is a 74y.o. year old male who presents for follow up of his chronic health conditions. Patient with hx of COPD, insomnia, depression, HLD and chronic LBP. There is continued weight loss. He complains that his depression, anxiety and insomnia are worsening. He says he has a pending appointment with a  Psychiatrist. He denies SI/HI. Patient says hip and back pain worsening as well. BP Readings from Last 3 Encounters:   06/22/22 122/71   03/17/22 (!) 158/75   12/22/21 (!) 157/91     Wt Readings from Last 3 Encounters:   06/22/22 152 lb (68.9 kg)   03/17/22 156 lb (70.8 kg)   12/22/21 165 lb 3.2 oz (74.9 kg)     Body mass index is 20.61 kg/m². Hyperlipidemia    Cardiovascular risks for him are: hyperlipidemia, smoker. Currently he takes Pravachol (pravastatin)     Lab Results   Component Value Date/Time    Cholesterol, total 179 03/17/2022 12:00 AM    HDL Cholesterol 46 03/17/2022 12:00 AM    LDL, calculated 118 (H) 03/17/2022 12:00 AM    LDL, calculated 114.6 (H) 08/31/2021 09:40 AM    Triglyceride 83 03/17/2022 12:00 AM    CHOL/HDL Ratio 3.5 08/31/2021 09:40 AM     Lab Results   Component Value Date/Time    Sodium 142 03/17/2022 12:00 AM    Potassium 4.9 03/17/2022 12:00 AM    Chloride 106 03/17/2022 12:00 AM    CO2 17 (L) 03/17/2022 12:00 AM    Anion gap 3 (L) 08/31/2021 09:40 AM    Glucose 95 03/17/2022 12:00 AM    BUN 23 03/17/2022 12:00 AM    Creatinine 1.27 03/17/2022 12:00 AM    BUN/Creatinine ratio 18 03/17/2022 12:00 AM    GFR est AA >60 08/31/2021 09:40 AM    GFR est non-AA >60 08/31/2021 09:40 AM    Calcium 10.0 03/17/2022 12:00 AM    Bilirubin, total 0.8 03/17/2022 12:00 AM    ALT (SGPT) 12 03/17/2022 12:00 AM    Alk.  phosphatase 105 03/17/2022 12:00 AM    Protein, total 7.3 03/17/2022 12:00 AM    Albumin 4.9 (H) 03/17/2022 12:00 AM    Globulin 3.2 08/31/2021 09:40 AM    A-G Ratio 2.0 03/17/2022 12:00 AM      Our goal is to lower hyperlipidemia to decrease the risks of strokes and heart attacks      Patient Active Problem List   Diagnosis Code    Hyperlipemia E78.5    Insomnia G47.00    History of left hip replacement Z96.642    Depression F32. A    Tobacco abuse Z72.0    Conductive hearing loss, bilateral H90.0    Chronic obstructive pulmonary disease (HCC) J44.9     Past Surgical History:   Procedure Laterality Date    COLONOSCOPY N/A 8/23/2016    [facility] Maxwell [facility] MD at [facility] ENDOSCOPY    COLONOSCOPY N/A 9/18/2019    COLONOSCOPY performed by Mary Quiles MD at [facility] [address] HX COLONOSCOPY      HX GI      surgery for hemorrhoids    HX HEENT      all teeth removed    HX ORTHOPAEDIC      left hip replacement    HX ORTHOPAEDIC      infection after sutured finger/then healed/then had a growth removed 2 1/2 yrs later - benign    HX OTHER SURGICAL      growth on left side of testicle removed - benign     Social History     Socioeconomic History    Marital status: SINGLE     Spouse name: Not on file    Number of children: Not on file    Years of education: Not on file    Highest education level: Not on file   Occupational History    Not on file   Tobacco Use    Smoking status: Current Every Day Smoker     Packs/day: 0.50     Years: 45.00     Pack years: 22.50     Types: Cigarettes    Smokeless tobacco: Never Used   Substance and Sexual Activity    Alcohol use: No    Drug use: No    Sexual activity: Never   Other Topics Concern    Not on file   Social History Narrative    Not on file     Social Determinants of Health     Financial Resource Strain:     Difficulty of Paying Living Expenses: Not on file   Food Insecurity:     Worried About Running Out of Food in the Last Year: Not on file    Ashley of Food in the Last Year: Not on file   Transportation Needs:     Lack of Transportation (Medical): Not on file    Lack of Transportation (Non-Medical):  Not on file   Physical Activity:  Days of Exercise per Week: Not on file    Minutes of Exercise per Session: Not on file   Stress:     Feeling of Stress : Not on file   Social Connections:     Frequency of Communication with Friends and Family: Not on file    Frequency of Social Gatherings with Friends and Family: Not on file    Attends Yarsani Services: Not on file    Active Member of [address] [place] [address] [employer] or Organizations: Not on file    Attends Club or Organization Meetings: Not on file    Marital Status: Not on file   Intimate Partner Violence:     Fear of Current or Ex-Partner: Not on file    Emotionally Abused: Not on file    Physically Abused: Not on file    Sexually Abused: Not on file   Housing Stability:     Unable to Pay for Housing in the Last Year: Not on file    Number of [place] in the Last Year: Not on file    Unstable Housing in the Last Year: Not on file     Family History   Problem Relation Age of Onset    Cancer Mother         breast    Stroke Mother         x2    Cancer Father         throat/lung    Heart Disease Father     Lung Disease Father     Thyroid Disease Father     Heart Attack Father     Suicide Brother     Psychiatric Disorder Brother     Heart Attack Paternal Uncle     Heart Attack Paternal Uncle         x3     Current Outpatient Medications   Medication Sig    losartan (COZAAR) 50 mg tablet Take 1 Tablet by mouth daily. Indications: high blood pressure    tamsulosin (FLOMAX) 0.4 mg capsule TAKE TWO CAPSULES BY MOUTH DAILY    omeprazole (PRILOSEC) 20 mg capsule TAKE ONE CAPSULE BY MOUTH ONCE A DAY    pravastatin (PRAVACHOL) 40 mg tablet TAKE ONE TABLET BY MOUTH EVERY NIGHT    tiZANidine (ZANAFLEX) 4 mg tablet Take 1 Tablet by mouth two (2) times a day.  Ventolin HFA 90 mcg/actuation inhaler inhale 2 PUFFS by mouth EVERY 6 HOURS AS NEEDED FOR WHEEZING    diazePAM (VALIUM) 5 mg tablet TAKE ONE TABLET BY MOUTH EVERY TWELVE HOURS AS NEEDED FOR ANXIETY.  max daily AMOUNT: 10 MG    traZODone (DESYREL) 150 mg tablet Take 1 Tablet by mouth nightly.  diclofenac EC (VOLTAREN) 75 mg EC tablet TAKE ONE TABLET BY MOUTH TWICE DAILY    montelukast (SINGULAIR) 10 mg tablet Take 1 Tablet by mouth daily.  buPROPion XL (WELLBUTRIN XL) 150 mg tablet Take 1 Tablet by mouth daily.  DULoxetine (CYMBALTA) 30 mg capsule TAKE ONE CAPSULE BY MOUTH ONCE A DAY     No current facility-administered medications for this visit. Allergies   Allergen Reactions    Percocet [Oxycodone-Acetaminophen] Rash and Itching     itching       Review of Systems:  Constitutional: Negative for fatigue, malaise  Derm: Negative for rash or lesion  HEENT: Negative for acute hearing changes  Resp: Negative for cough, wheezing or SOB  CV: Negative for chest pain, dizziness or palpitations  Gastrointestinal: see HPI, Negative for nausea or abdominal pain  Genital/urinary: frequent urination  MS: see HPI  Neuro: Negative for HA, weakness or paresthesia  Psych: see HPI, history of anxiety, depression and insomnia     Vitals:    06/22/22 0820 06/22/22 0843   BP: (!) 152/94 122/71   Pulse: 74    Resp: 16    Temp: 97.4 °F (36.3 °C)    TempSrc: Oral    SpO2: 96%    Weight: 152 lb (68.9 kg)    Height: 6' (1.829 m)        Physical Examination:  General: thin, appears distressed  Head: Normocephalic, atraumatic  Eyes: Sclera clear, EOMI  Neck: Normal range of motion  Respiratory: CTAB with symmetrical, unlabored effort  Cardiovascular: Normal S1, S2, Regular rate and rhythm  Extremities: antalgic gait, ambulates with cane  Neurologic: Normal strength and sensation  Psych: animated, talkative     Diagnoses and all orders for this visit:    1. Recurrent major depressive disorder, in partial remission ([address]    2. Anxiety    3. Primary insomnia    4. Essential hypertension  -     losartan (COZAAR) 50 mg tablet; Take 1 Tablet by mouth daily. Indications: high blood pressure    5.  Hyperlipidemia, unspecified hyperlipidemia type  -     pravastatin (PRAVACHOL) 40 mg tablet; TAKE ONE TABLET BY MOUTH EVERY NIGHT    6. Chronic obstructive pulmonary disease, unspecified COPD type ([facility] [address]    7. Benign prostatic hyperplasia with nocturia  -     tamsulosin (FLOMAX) 0.4 mg capsule; TAKE TWO CAPSULES BY MOUTH DAILY    8. Gastroesophageal reflux disease  -     omeprazole (PRILOSEC) 20 mg capsule; TAKE ONE CAPSULE BY MOUTH ONCE A DAY    9. Spinal stenosis of lumbar region, unspecified whether neurogenic claudication present  -     tiZANidine (ZANAFLEX) 4 mg tablet; Take 1 Tablet by mouth two (2) times a day. 10. DDD (degenerative disc disease), lumbar  -     tiZANidine (ZANAFLEX) 4 mg tablet; Take 1 Tablet by mouth two (2) times a day. 11. History of left hip replacement  -     XR HIP LT W OR WO PELV 2-3 VWS; Future    12. Left hip pain  -     XR HIP LT W OR WO PELV 2-3 VWS; Future    13. Tobacco dependence      Plan of care:  Diagnoses were discussed in detail with patient. Medication risks/benefits/side effects discussed with patient. Importance of compliance with all prescribed medications discussed. All of the patient's questions were addressed and answered to apparent satisfaction. The patient understands and agrees with our plan of care. The patient knows to call back if they have questions about the plan of care or if symptoms change. The patient received an After-Visit Summary which contains VS, diagnoses, orders, allergy and medication lists. Greater than 40 minutes spent face to face with patient. Future Appointments   Date Time Provider Marzena Chopra   9/23/2022  8:00 AM Rizwan Broderick MD [facility] BS AMB   9/30/2022  8:30 AM [facility]         Follow-up and Dispositions    · Return in about 3 months (around 9/22/2022), or if symptoms worsen or fail to improve.

## 2022-07-18 ENCOUNTER — TELEPHONE (OUTPATIENT)
Dept: FAMILY MEDICINE CLINIC | Age: 67
End: 2022-07-18

## 2022-07-18 DIAGNOSIS — R31.9 HEMATURIA, UNSPECIFIED TYPE: Primary | ICD-10-CM

## 2022-07-18 LAB
ALBUMIN UR QL STRIP: 80 MG/L
BILIRUB UR QL STRIP: NORMAL
CREATININE, URINE POC: 300 MG/DL
GLUCOSE UR-MCNC: NEGATIVE MG/DL
KETONES P FAST UR STRIP-MCNC: NEGATIVE MG/DL
MICROALBUMIN/CREAT RATIO POC: NORMAL MG/G
PH UR STRIP: 5.5 [PH] (ref 4.6–8)
PROT UR QL STRIP: NEGATIVE
SP GR UR STRIP: 1.01 (ref 1–1.03)
UA UROBILINOGEN AMB POC: NORMAL (ref 0.2–1)
URINALYSIS CLARITY POC: CLEAR
URINALYSIS COLOR POC: NORMAL
URINE BLOOD POC: NORMAL
URINE LEUKOCYTES POC: NEGATIVE
URINE NITRITES POC: NEGATIVE

## 2022-07-18 PROCEDURE — 82044 UR ALBUMIN SEMIQUANTITATIVE: CPT | Performed by: FAMILY MEDICINE

## 2022-07-18 PROCEDURE — 81001 URINALYSIS AUTO W/SCOPE: CPT | Performed by: FAMILY MEDICINE

## 2022-07-18 RX ORDER — PREDNISONE 20 MG/1
20 TABLET ORAL 2 TIMES DAILY
Qty: 10 TABLET | Refills: 0 | Status: SHIPPED | OUTPATIENT
Start: 2022-07-18 | End: 2022-07-23

## 2022-07-18 NOTE — TELEPHONE ENCOUNTER
Pt states he is having a lot of Covid symptoms and would like to come in for covid test. Pt states he took an at home test that was positive last night. Pt would like nurse to contact him. Pt would also like a urinaylsys done due to blood in his urine. Please advise.

## 2022-07-18 NOTE — TELEPHONE ENCOUNTER
Called patient. He was advised to quarantine at home x5 days and then continue to wear a mask x5 days. Patient advised to stay well hydrated. Patient advised he may come to back entrance to  a specimen cup to check urine. . Verbalized understanding.

## 2022-07-18 NOTE — TELEPHONE ENCOUNTER
Patient at back entrance and provided urine specimen. Patient asked if Dr Chowdhury Fairly can prescribe him medicine for Covid?

## 2022-07-21 ENCOUNTER — TELEPHONE (OUTPATIENT)
Dept: FAMILY MEDICINE CLINIC | Age: 67
End: 2022-07-21

## 2022-07-21 NOTE — TELEPHONE ENCOUNTER
Attempted to call. No answer. Message left. Advise patient per Dr Simons:\"No blood seen in urine. No sign of infection. Dark urine most likely secondary to Covid-19. \"

## 2022-07-21 NOTE — TELEPHONE ENCOUNTER
Pt would like to know what the urine test results are? Pt state that he I feeling a lot better.  He is eating more

## 2022-07-25 ENCOUNTER — TELEPHONE (OUTPATIENT)
Dept: FAMILY MEDICINE CLINIC | Age: 67
End: 2022-07-25

## 2022-08-16 DIAGNOSIS — Z79.899 BENZODIAZEPINE CONTRACT EXISTS: Primary | ICD-10-CM

## 2022-08-16 DIAGNOSIS — F41.9 ANXIETY: ICD-10-CM

## 2022-08-16 DIAGNOSIS — M51.36 DDD (DEGENERATIVE DISC DISEASE), LUMBAR: ICD-10-CM

## 2022-08-17 PROBLEM — Z79.899 BENZODIAZEPINE CONTRACT EXISTS: Status: ACTIVE | Noted: 2022-08-17

## 2022-08-17 RX ORDER — DIAZEPAM 5 MG/1
TABLET ORAL
Qty: 30 TABLET | Refills: 0 | Status: SHIPPED | OUTPATIENT
Start: 2022-08-17 | End: 2022-09-27 | Stop reason: SDUPTHER

## 2022-09-12 DIAGNOSIS — M51.36 DDD (DEGENERATIVE DISC DISEASE), LUMBAR: ICD-10-CM

## 2022-09-14 RX ORDER — DICLOFENAC SODIUM 75 MG/1
75 TABLET, DELAYED RELEASE ORAL 2 TIMES DAILY
Qty: 180 TABLET | Refills: 1 | Status: SHIPPED | OUTPATIENT
Start: 2022-09-14

## 2022-09-23 ENCOUNTER — OFFICE VISIT (OUTPATIENT)
Dept: FAMILY MEDICINE CLINIC | Age: 67
End: 2022-09-23
Payer: MEDICARE

## 2022-09-23 VITALS
OXYGEN SATURATION: 96 % | DIASTOLIC BLOOD PRESSURE: 93 MMHG | TEMPERATURE: 98.1 F | HEART RATE: 98 BPM | WEIGHT: 146.6 LBS | RESPIRATION RATE: 18 BRPM | SYSTOLIC BLOOD PRESSURE: 166 MMHG | BODY MASS INDEX: 19.86 KG/M2 | HEIGHT: 72 IN

## 2022-09-23 DIAGNOSIS — J44.9 CHRONIC OBSTRUCTIVE PULMONARY DISEASE, UNSPECIFIED COPD TYPE (HCC): ICD-10-CM

## 2022-09-23 DIAGNOSIS — Z79.899 BENZODIAZEPINE CONTRACT EXISTS: ICD-10-CM

## 2022-09-23 DIAGNOSIS — M51.36 DDD (DEGENERATIVE DISC DISEASE), LUMBAR: ICD-10-CM

## 2022-09-23 DIAGNOSIS — F51.01 PRIMARY INSOMNIA: ICD-10-CM

## 2022-09-23 DIAGNOSIS — F17.200 TOBACCO DEPENDENCE: ICD-10-CM

## 2022-09-23 DIAGNOSIS — F33.41 RECURRENT MAJOR DEPRESSIVE DISORDER, IN PARTIAL REMISSION (HCC): ICD-10-CM

## 2022-09-23 DIAGNOSIS — F41.9 ANXIETY: ICD-10-CM

## 2022-09-23 DIAGNOSIS — R63.4 WEIGHT LOSS: Primary | ICD-10-CM

## 2022-09-23 PROCEDURE — G9717 DOC PT DX DEP/BP F/U NT REQ: HCPCS | Performed by: FAMILY MEDICINE

## 2022-09-23 PROCEDURE — G8536 NO DOC ELDER MAL SCRN: HCPCS | Performed by: FAMILY MEDICINE

## 2022-09-23 PROCEDURE — G8427 DOCREV CUR MEDS BY ELIG CLIN: HCPCS | Performed by: FAMILY MEDICINE

## 2022-09-23 PROCEDURE — 1101F PT FALLS ASSESS-DOCD LE1/YR: CPT | Performed by: FAMILY MEDICINE

## 2022-09-23 PROCEDURE — 99214 OFFICE O/P EST MOD 30 MIN: CPT | Performed by: FAMILY MEDICINE

## 2022-09-23 PROCEDURE — 1123F ACP DISCUSS/DSCN MKR DOCD: CPT | Performed by: FAMILY MEDICINE

## 2022-09-23 PROCEDURE — 3017F COLORECTAL CA SCREEN DOC REV: CPT | Performed by: FAMILY MEDICINE

## 2022-09-23 PROCEDURE — G8755 DIAS BP > OR = 90: HCPCS | Performed by: FAMILY MEDICINE

## 2022-09-23 PROCEDURE — G8420 CALC BMI NORM PARAMETERS: HCPCS | Performed by: FAMILY MEDICINE

## 2022-09-23 PROCEDURE — G8753 SYS BP > OR = 140: HCPCS | Performed by: FAMILY MEDICINE

## 2022-09-23 RX ORDER — MEGESTROL ACETATE 20 MG/1
20 TABLET ORAL DAILY
Qty: 30 TABLET | Refills: 2 | Status: SHIPPED | OUTPATIENT
Start: 2022-09-23

## 2022-09-23 RX ORDER — DULOXETIN HYDROCHLORIDE 30 MG/1
CAPSULE, DELAYED RELEASE ORAL
Qty: 90 CAPSULE | Refills: 1 | Status: SHIPPED | OUTPATIENT
Start: 2022-09-23

## 2022-09-23 NOTE — PROGRESS NOTES
1. Have you been to the ER, urgent care clinic since your last visit? Hospitalized since your last visit? No    2. Have you seen or consulted any other health care providers outside of the [address] [place] [address] since your last visit? Include any pap smears or colon screening. No    3. For patients aged 39-70: Has the patient had a colonoscopy / FIT/ Cologuard? Yes     If the patient is female:    4. For patients aged 41-77: Has the patient had a mammogram within the past 2 years? NA - based on age or sex      11. For patients aged 21-65: Has the patient had a pap smear? NA - based on age or sex     Reviewed record in preparation for visit and have necessary documentation  Pt did not bring medication to office visit for review  Patient is accompanied by self I have received verbal consent from Keila Mchugh to discuss any/all medical information while they are present in the room.     Goals that were addressed and/or need to be completed during or after this appointment include     Health Maintenance Due   Topic Date Due    COVID-19 Vaccine (1) Never done    Pneumococcal 65+ years (1 - PCV) Never done    Low dose CT lung screening  Never done    AAA Screening 73-69 YO Male Smoking Patients  Never done    Flu Vaccine (1) Never done

## 2022-09-27 RX ORDER — TRAZODONE HYDROCHLORIDE 150 MG/1
150 TABLET ORAL
Qty: 90 TABLET | Refills: 1 | Status: SHIPPED | OUTPATIENT
Start: 2022-09-27

## 2022-09-27 RX ORDER — TIZANIDINE 4 MG/1
4 TABLET ORAL 2 TIMES DAILY
Qty: 180 TABLET | Refills: 0 | Status: SHIPPED | OUTPATIENT
Start: 2022-09-27

## 2022-09-27 RX ORDER — DIAZEPAM 5 MG/1
TABLET ORAL
Qty: 30 TABLET | Refills: 0 | Status: SHIPPED | OUTPATIENT
Start: 2022-09-27

## 2022-09-27 RX ORDER — MONTELUKAST SODIUM 10 MG/1
10 TABLET ORAL DAILY
Qty: 90 TABLET | Refills: 1 | Status: SHIPPED | OUTPATIENT
Start: 2022-09-27

## 2022-09-27 RX ORDER — BUPROPION HYDROCHLORIDE 150 MG/1
150 TABLET ORAL DAILY
Qty: 90 TABLET | Refills: 1 | Status: SHIPPED | OUTPATIENT
Start: 2022-09-27

## 2022-09-28 NOTE — PROGRESS NOTES
Chief Complaint   Patient presents with    Follow-up     3 month follow up      he is a 74y.o. year old male who presents for follow up of his chronic health conditions. He missed this mornings appointment and walked into clinic. Patient with hx of COPD, insomnia, depression, HLD and chronic LBP. There is continued weight loss. He complains that his depression, anxiety and insomnia are worsening. He denies SI/HI. Patient says hip and back pain continues to worsen. BP Readings from Last 3 Encounters:   09/23/22 (!) 166/93   06/22/22 122/71   03/17/22 (!) 158/75     Wt Readings from Last 3 Encounters:   09/23/22 146 lb 9.6 oz (66.5 kg)   06/22/22 152 lb (68.9 kg)   03/17/22 156 lb (70.8 kg)     Body mass index is 19.88 kg/m². Hyperlipidemia    Cardiovascular risks for him are: hyperlipidemia, smoker. Currently he takes Pravachol (pravastatin)     Lab Results   Component Value Date/Time    Cholesterol, total 179 03/17/2022 12:00 AM    HDL Cholesterol 46 03/17/2022 12:00 AM    LDL, calculated 118 (H) 03/17/2022 12:00 AM    LDL, calculated 114.6 (H) 08/31/2021 09:40 AM    Triglyceride 83 03/17/2022 12:00 AM    CHOL/HDL Ratio 3.5 08/31/2021 09:40 AM     Lab Results   Component Value Date/Time    Sodium 142 03/17/2022 12:00 AM    Potassium 4.9 03/17/2022 12:00 AM    Chloride 106 03/17/2022 12:00 AM    CO2 17 (L) 03/17/2022 12:00 AM    Anion gap 3 (L) 08/31/2021 09:40 AM    Glucose 95 03/17/2022 12:00 AM    BUN 23 03/17/2022 12:00 AM    Creatinine 1.27 03/17/2022 12:00 AM    BUN/Creatinine ratio 18 03/17/2022 12:00 AM    GFR est AA >60 08/31/2021 09:40 AM    GFR est non-AA >60 08/31/2021 09:40 AM    Calcium 10.0 03/17/2022 12:00 AM    Bilirubin, total 0.8 03/17/2022 12:00 AM    ALT (SGPT) 12 03/17/2022 12:00 AM    Alk.  phosphatase 105 03/17/2022 12:00 AM    Protein, total 7.3 03/17/2022 12:00 AM    Albumin 4.9 (H) 03/17/2022 12:00 AM    Globulin 3.2 08/31/2021 09:40 AM    A-G Ratio 2.0 03/17/2022 12:00 AM      Our goal is to lower hyperlipidemia to decrease the risks of strokes and heart attacks      Patient Active Problem List   Diagnosis Code    Hyperlipemia E78.5    Insomnia G47.00    History of left hip replacement U14.166    Depression F32. A    Tobacco abuse Z72.0    Conductive hearing loss, bilateral H90.0    Chronic obstructive pulmonary disease (HCC) J44.9    Benzodiazepine contract exists Z79.899     Past Surgical History:   Procedure Laterality Date    COLONOSCOPY N/A 8/23/2016    COLONOSCOPY performed by Kasey Malone MD at [facility] ENDOSCOPY    COLONOSCOPY N/A 9/18/2019    COLONOSCOPY performed by Camille Jones MD at [facility] ENDOSCOPY    HX COLONOSCOPY      HX GI      surgery for hemorrhoids    HX HEENT      all teeth removed    HX ORTHOPAEDIC      left hip replacement    HX ORTHOPAEDIC      infection after sutured finger/then healed/then had a growth removed 2 1/2 yrs later - benign    HX OTHER SURGICAL      growth on left side of testicle removed - benign     Social History     Socioeconomic History    Marital status: SINGLE     Spouse name: Not on file    Number of children: Not on file    Years of education: Not on file    Highest education level: Not on file   Occupational History    Not on file   Tobacco Use    Smoking status: Every Day     Packs/day: 0.50     Years: 45.00     Pack years: 22.50     Types: Cigarettes    Smokeless tobacco: Never   Substance and Sexual Activity    Alcohol use: No    Drug use: No    Sexual activity: Never   Other Topics Concern    Not on file   Social History Narrative    Not on file     Social Determinants of Health     Financial Resource Strain: High Risk    Difficulty of Paying Living Expenses: Hard   Food Insecurity: No Food Insecurity    Worried About Running Out of Food in the Last Year: Never true    Ran Out of Food in the Last Year: Never true   Transportation Needs: Not on file   Physical Activity: Not on file   Stress: Not on file   Social Connections: Not on file   Intimate Partner Violence: Not on file   Housing Stability: Not on file     Family History   Problem Relation Age of Onset    Cancer Mother         breast    Stroke Mother         x2    Cancer Father         throat/lung    Heart Disease Father     Lung Disease Father     Thyroid Disease Father     Heart Attack Father     Suicide Brother     Psychiatric Disorder Brother     Heart Attack Paternal Uncle     Heart Attack Paternal Uncle         x3     Current Outpatient Medications   Medication Sig    tiZANidine (ZANAFLEX) 4 mg tablet Take 1 Tablet by mouth two (2) times a day. traZODone (DESYREL) 150 mg tablet Take 1 Tablet by mouth nightly. buPROPion XL (WELLBUTRIN XL) 150 mg tablet Take 1 Tablet by mouth daily. diazePAM (VALIUM) 5 mg tablet TAKE ONE TABLET BY MOUTH EVERY TWELVE HOURS AS NEEDED FOR ANXIETY. max daily AMOUNT: 10 MG    montelukast (SINGULAIR) 10 mg tablet Take 1 Tablet by mouth daily. DULoxetine (CYMBALTA) 30 mg capsule TAKE ONE CAPSULE BY MOUTH ONCE A DAY    megestroL (MEGACE) 20 mg tablet Take 1 Tablet by mouth daily. Indications: appetite    diclofenac EC (VOLTAREN) 75 mg EC tablet Take 1 Tablet by mouth two (2) times a day. losartan (COZAAR) 50 mg tablet Take 1 Tablet by mouth daily. Indications: high blood pressure    pravastatin (PRAVACHOL) 40 mg tablet TAKE ONE TABLET BY MOUTH EVERY NIGHT    Ventolin HFA 90 mcg/actuation inhaler inhale 2 PUFFS by mouth EVERY 6 HOURS AS NEEDED FOR WHEEZING    tamsulosin (FLOMAX) 0.4 mg capsule TAKE TWO CAPSULES BY MOUTH DAILY     No current facility-administered medications for this visit.      Allergies   Allergen Reactions    Percocet [Oxycodone-Acetaminophen] Rash and Itching     itching       Review of Systems:  Constitutional: Negative for fatigue, malaise  Derm: Negative for rash or lesion  HEENT: Negative for acute hearing changes  Resp: Negative for cough, wheezing or SOB  CV: Negative for chest pain, dizziness or palpitations  Gastrointestinal: see HPI, Negative for nausea or abdominal pain  Genital/urinary: frequent urination  MS: see HPI  Neuro: Negative for HA, weakness or paresthesia  Psych: see HPI, history of anxiety, depression and insomnia     Vitals:    09/23/22 1359 09/23/22 1457   BP: (!) 154/100 (!) 166/93   Pulse: 98    Resp: 18    Temp: 98.1 °F (36.7 °C)    TempSrc: Oral    SpO2: 96%    Weight: 146 lb 9.6 oz (66.5 kg)    Height: 6' (1.829 m)        Physical Examination:  General: thin, appears distressed  Head: Normocephalic, atraumatic  Eyes: Sclera clear, EOMI  Neck: Normal range of motion  Respiratory: CTAB with symmetrical, unlabored effort  Cardiovascular: Normal S1, S2, Regular rate and rhythm  Extremities: antalgic gait, ambulates with cane  Neurologic: Normal strength and sensation  Psych: animated, talkative     Diagnoses and all orders for this visit:    1. Weight loss  -     megestroL (MEGACE) 20 mg tablet; Take 1 Tablet by mouth daily. Indications: appetite    2. Primary insomnia  -     traZODone (DESYREL) 150 mg tablet; Take 1 Tablet by mouth nightly. 3. Recurrent major depressive disorder, in partial remission (HCC)  -     DULoxetine (CYMBALTA) 30 mg capsule; TAKE ONE CAPSULE BY MOUTH ONCE A DAY  -     buPROPion XL (WELLBUTRIN XL) 150 mg tablet; Take 1 Tablet by mouth daily. 4. Anxiety  -     DULoxetine (CYMBALTA) 30 mg capsule; TAKE ONE CAPSULE BY MOUTH ONCE A DAY  -     diazePAM (VALIUM) 5 mg tablet; TAKE ONE TABLET BY MOUTH EVERY TWELVE HOURS AS NEEDED FOR ANXIETY. max daily AMOUNT: 10 MG    5. Benzodiazepine contract exists  -     diazePAM (VALIUM) 5 mg tablet; TAKE ONE TABLET BY MOUTH EVERY TWELVE HOURS AS NEEDED FOR ANXIETY. max daily AMOUNT: 10 MG    6. DDD (degenerative disc disease), lumbar  -     DULoxetine (CYMBALTA) 30 mg capsule; TAKE ONE CAPSULE BY MOUTH ONCE A DAY  -     tiZANidine (ZANAFLEX) 4 mg tablet; Take 1 Tablet by mouth two (2) times a day.   -     diazePAM (VALIUM) 5 mg tablet; TAKE ONE TABLET BY MOUTH EVERY TWELVE HOURS AS NEEDED FOR ANXIETY. max daily AMOUNT: 10 MG    7. Chronic obstructive pulmonary disease, unspecified COPD type (HCC)  -     montelukast (SINGULAIR) 10 mg tablet; Take 1 Tablet by mouth daily. 8. Tobacco dependence    Plan of care:  Diagnoses were discussed in detail with patient. Medications reviewed and appropriate. Patient to continue current prescribed medications as written. Medication risks/benefits/side effects discussed with patient. All of the patient's questions were addressed and answered to apparent satisfaction. The patient understands and agrees with our plan of care. The patient knows to call back if they have questions about the plan of care or if symptoms change. The patient received an After-Visit Summary which contains VS, diagnoses, orders, allergy and medication lists. Future Appointments   Date Time Provider Marzena Chopra   1/4/2023  8:00 AM Rojas Terrazas MD [facility] BS AMB         Future Appointments   Date Time Provider Marzena Chopra   1/4/2023  8:00 AM Rojas Terrazas MD [facility] BS AMB         Follow-up and Dispositions    Return in about 3 months (around 12/23/2022), or if symptoms worsen or fail to improve.

## 2022-11-04 ENCOUNTER — TELEPHONE (OUTPATIENT)
Dept: FAMILY MEDICINE CLINIC | Age: 67
End: 2022-11-04

## 2022-11-04 NOTE — TELEPHONE ENCOUNTER
Patient came to office asking if Dr Gisselle Arora can write a letter so he doesn't have to appear at jury duty?  Fax to [phone]

## 2022-11-04 NOTE — LETTER
11/12/2022 4:52 PM    Mr. Melony Gupta  [address] [address]      To whom it may concern:    Mr. Tereza Sanchez is a patient at the [facility]. He has chronic medical conditions which preclude him from serving any jury duty. Please call our office if there are any questions.         Sincerely,      Diana Bang MD

## 2022-12-19 DIAGNOSIS — R35.1 BENIGN PROSTATIC HYPERPLASIA WITH NOCTURIA: ICD-10-CM

## 2022-12-19 DIAGNOSIS — N40.1 BENIGN PROSTATIC HYPERPLASIA WITH NOCTURIA: ICD-10-CM

## 2022-12-20 RX ORDER — TAMSULOSIN HYDROCHLORIDE 0.4 MG/1
CAPSULE ORAL
Qty: 180 CAPSULE | Refills: 1 | Status: SHIPPED | OUTPATIENT
Start: 2022-12-20

## 2023-01-04 ENCOUNTER — OFFICE VISIT (OUTPATIENT)
Dept: FAMILY MEDICINE CLINIC | Age: 68
End: 2023-01-04
Payer: MEDICARE

## 2023-01-04 VITALS
OXYGEN SATURATION: 96 % | BODY MASS INDEX: 21.35 KG/M2 | DIASTOLIC BLOOD PRESSURE: 91 MMHG | HEART RATE: 62 BPM | SYSTOLIC BLOOD PRESSURE: 156 MMHG | TEMPERATURE: 98 F | HEIGHT: 72 IN | RESPIRATION RATE: 20 BRPM | WEIGHT: 157.6 LBS

## 2023-01-04 DIAGNOSIS — J44.9 CHRONIC OBSTRUCTIVE PULMONARY DISEASE, UNSPECIFIED COPD TYPE (HCC): ICD-10-CM

## 2023-01-04 DIAGNOSIS — F41.9 ANXIETY: ICD-10-CM

## 2023-01-04 DIAGNOSIS — Z00.00 MEDICARE ANNUAL WELLNESS VISIT, SUBSEQUENT: ICD-10-CM

## 2023-01-04 DIAGNOSIS — R35.1 BENIGN PROSTATIC HYPERPLASIA WITH NOCTURIA: ICD-10-CM

## 2023-01-04 DIAGNOSIS — E78.5 HYPERLIPIDEMIA, UNSPECIFIED HYPERLIPIDEMIA TYPE: ICD-10-CM

## 2023-01-04 DIAGNOSIS — M51.36 DDD (DEGENERATIVE DISC DISEASE), LUMBAR: ICD-10-CM

## 2023-01-04 DIAGNOSIS — R40.0 DAYTIME SLEEPINESS: ICD-10-CM

## 2023-01-04 DIAGNOSIS — I10 ESSENTIAL HYPERTENSION: Primary | ICD-10-CM

## 2023-01-04 DIAGNOSIS — F33.41 RECURRENT MAJOR DEPRESSIVE DISORDER, IN PARTIAL REMISSION (HCC): ICD-10-CM

## 2023-01-04 DIAGNOSIS — N40.1 BENIGN PROSTATIC HYPERPLASIA WITH NOCTURIA: ICD-10-CM

## 2023-01-04 DIAGNOSIS — Z79.899 BENZODIAZEPINE CONTRACT EXISTS: ICD-10-CM

## 2023-01-04 PROCEDURE — G8536 NO DOC ELDER MAL SCRN: HCPCS | Performed by: FAMILY MEDICINE

## 2023-01-04 PROCEDURE — 99214 OFFICE O/P EST MOD 30 MIN: CPT | Performed by: FAMILY MEDICINE

## 2023-01-04 PROCEDURE — 3074F SYST BP LT 130 MM HG: CPT | Performed by: FAMILY MEDICINE

## 2023-01-04 PROCEDURE — 1123F ACP DISCUSS/DSCN MKR DOCD: CPT | Performed by: FAMILY MEDICINE

## 2023-01-04 PROCEDURE — G0439 PPPS, SUBSEQ VISIT: HCPCS | Performed by: FAMILY MEDICINE

## 2023-01-04 PROCEDURE — G9717 DOC PT DX DEP/BP F/U NT REQ: HCPCS | Performed by: FAMILY MEDICINE

## 2023-01-04 PROCEDURE — G8420 CALC BMI NORM PARAMETERS: HCPCS | Performed by: FAMILY MEDICINE

## 2023-01-04 PROCEDURE — 3017F COLORECTAL CA SCREEN DOC REV: CPT | Performed by: FAMILY MEDICINE

## 2023-01-04 PROCEDURE — 1101F PT FALLS ASSESS-DOCD LE1/YR: CPT | Performed by: FAMILY MEDICINE

## 2023-01-04 PROCEDURE — G8427 DOCREV CUR MEDS BY ELIG CLIN: HCPCS | Performed by: FAMILY MEDICINE

## 2023-01-04 PROCEDURE — 3078F DIAST BP <80 MM HG: CPT | Performed by: FAMILY MEDICINE

## 2023-01-04 RX ORDER — DIAZEPAM 5 MG/1
TABLET ORAL
Qty: 30 TABLET | Refills: 0 | Status: SHIPPED | OUTPATIENT
Start: 2023-01-04

## 2023-01-04 NOTE — PROGRESS NOTES
Chief Complaint   Patient presents with    Follow Up Chronic Condition     he is a 69y.o. year old male who presents for follow up of his chronic health conditions. Patient with hx of COPD, insomnia, depression, HLD and chronic LBP. He says trazodone effective for his insomnia. However he complains of not feeling rested upon awakening and excessive daytime sleepiness. BP elevated today. BP Readings from Last 3 Encounters:   01/04/23 (!) 156/91   09/23/22 (!) 166/93   06/22/22 122/71     Wt Readings from Last 3 Encounters:   01/04/23 157 lb 9.6 oz (71.5 kg)   09/23/22 146 lb 9.6 oz (66.5 kg)   06/22/22 152 lb (68.9 kg)     Body mass index is 21.37 kg/m². Hyperlipidemia    Cardiovascular risks for him are: hyperlipidemia, smoker. Currently he takes Pravachol (pravastatin)     Lab Results   Component Value Date/Time    Cholesterol, total 191 01/04/2023 09:23 AM    HDL Cholesterol 46 01/04/2023 09:23 AM    LDL, calculated 121 (H) 01/04/2023 09:23 AM    Triglyceride 120 01/04/2023 09:23 AM    CHOL/HDL Ratio 4.2 01/04/2023 09:23 AM     Lab Results   Component Value Date/Time    Sodium 139 01/04/2023 09:23 AM    Potassium 4.7 01/04/2023 09:23 AM    Chloride 109 (H) 01/04/2023 09:23 AM    CO2 27 01/04/2023 09:23 AM    Anion gap 3 (L) 01/04/2023 09:23 AM    Glucose 79 01/04/2023 09:23 AM    BUN 23 (H) 01/04/2023 09:23 AM    Creatinine 1.16 01/04/2023 09:23 AM    BUN/Creatinine ratio 20 01/04/2023 09:23 AM    GFR est AA >60 08/31/2021 09:40 AM    GFR est non-AA >60 08/31/2021 09:40 AM    Calcium 9.6 01/04/2023 09:23 AM    Bilirubin, total 0.9 01/04/2023 09:23 AM    ALT (SGPT) 30 01/04/2023 09:23 AM    Alk.  phosphatase 89 01/04/2023 09:23 AM    Protein, total 7.4 01/04/2023 09:23 AM    Albumin 4.2 01/04/2023 09:23 AM    Globulin 3.2 01/04/2023 09:23 AM    A-G Ratio 1.3 01/04/2023 09:23 AM      Our goal is to lower hyperlipidemia to decrease the risks of strokes and heart attacks      Patient Active Problem List Diagnosis Code    Hyperlipemia E78.5    Insomnia G47.00    History of left hip replacement S29.847    Depression F32. A    Tobacco abuse Z72.0    Conductive hearing loss, bilateral H90.0    Chronic obstructive pulmonary disease (HCC) J44.9    Benzodiazepine contract exists Z79.899    Recurrent major depressive disorder, in partial remission ([facility] [address] F33.41     Past Surgical History:   Procedure Laterality Date    COLONOSCOPY N/A 2016    COLONOSCOPY performed by Maykel eMsa MD at [facility] ENDOSCOPY    COLONOSCOPY N/A 2019    COLONOSCOPY performed by Joseph Mosqueda MD at [facility] ENDOSCOPY    HX COLONOSCOPY      HX GI      surgery for hemorrhoids    HX HEENT      all teeth removed    HX ORTHOPAEDIC      left hip replacement    HX ORTHOPAEDIC      infection after sutured finger/then healed/then had a growth removed 2 1/2 yrs later - benign    HX OTHER SURGICAL      growth on left side of testicle removed - benign     Social History     Socioeconomic History    Marital status: SINGLE     Spouse name: Not on file    Number of children: Not on file    Years of education: Not on file    Highest education level: Not on file   Occupational History    Not on file   Tobacco Use    Smoking status: Former     Packs/day: 0.50     Years: 45.00     Pack years: 22.50     Types: Cigarettes     Quit date: 12/15/2022     Years since quittin.0    Smokeless tobacco: Never   Substance and Sexual Activity    Alcohol use: No    Drug use: No    Sexual activity: Never   Other Topics Concern    Not on file   Social History Narrative    Not on file     Social Determinants of Health     Financial Resource Strain: High Risk    Difficulty of Paying Living Expenses: Hard   Food Insecurity: No Food Insecurity    Worried About Running Out of Food in the Last Year: Never true    Ran Out of Food in the Last Year: Never true   Transportation Needs: Not on file   Physical Activity: Not on file   Stress: Not on file   Social Connections: Not on file Intimate Partner Violence: Not on file   Housing Stability: Not on file     Family History   Problem Relation Age of Onset    Cancer Mother         breast    Stroke Mother         x2    Cancer Father         throat/lung    Heart Disease Father     Lung Disease Father     Thyroid Disease Father     Heart Attack Father     Suicide Brother     Psychiatric Disorder Brother     Heart Attack Paternal Uncle     Heart Attack Paternal Uncle         x3     Current Outpatient Medications   Medication Sig    fluticasone-umeclidinium-vilanterol (TRELEGY ELLIPTA) 100-62.5-25 mcg inhaler Take 1 Puff by inhalation daily. diazePAM (VALIUM) 5 mg tablet TAKE ONE TABLET BY MOUTH EVERY TWELVE HOURS AS NEEDED FOR ANXIETY. max daily AMOUNT: 10 MG    tamsulosin (FLOMAX) 0.4 mg capsule TAKE TWO CAPSULES BY MOUTH DAILY    diclofenac EC (VOLTAREN) 75 mg EC tablet TAKE ONE TABLET BY MOUTH TWICE DAILY    omeprazole (PRILOSEC) 20 mg capsule TAKE ONE CAPSULE BY MOUTH ONCE A DAY    DULoxetine (CYMBALTA) 30 mg capsule TAKE ONE CAPSULE BY MOUTH ONCE A DAY    buPROPion XL (WELLBUTRIN XL) 150 mg tablet TAKE ONE TABLET BY MOUTH EVERY DAY    montelukast (SINGULAIR) 10 mg tablet TAKE ONE TABLET BY MOUTH EVERY DAY    traZODone (DESYREL) 150 mg tablet Take one Tablet by mouth nightly. tiZANidine (ZANAFLEX) 4 mg tablet TAKE ONE TABLET BY MOUTH TWICE DAILY    losartan (COZAAR) 50 mg tablet Take 1 Tablet by mouth daily. pravastatin (PRAVACHOL) 40 mg tablet TAKE ONE TABLET BY MOUTH EVERY NIGHT    Ventolin HFA 90 mcg/actuation inhaler inhale 2 PUFFS by mouth EVERY 6 HOURS AS NEEDED FOR WHEEZING     No current facility-administered medications for this visit.      Allergies   Allergen Reactions    Percocet [Oxycodone-Acetaminophen] Rash and Itching     itching       Review of Systems:  Constitutional: Negative for fatigue, malaise  Derm: Negative for rash or lesion  HEENT: Negative for acute hearing changes  Resp: Negative for cough, wheezing or SOB  CV: Negative for dizziness or palpitations  Gastrointestinal: Negative for nausea or abdominal pain  Genital/urinary: frequent urination  MS: see HPI, hx of LBP  Neuro: Negative for HA, weakness or paresthesia  Psych: History of anxiety, depression and insomnia     Vitals:    01/04/23 0804 01/04/23 0812   BP: (!) 150/89 (!) 156/91   Pulse: 62    Resp: 20    Temp: 98 °F (36.7 °C)    TempSrc: Oral    SpO2: 96%    Weight: 157 lb 9.6 oz (71.5 kg)    Height: 6' (1.829 m)        Physical Examination:  General: thin, appears distressed  Head: Normocephalic, atraumatic  Eyes: Sclera clear, EOMI  Neck: Normal range of motion  Respiratory: CTAB with symmetrical, unlabored effort  Cardiovascular: Normal S1, S2, Regular rate and rhythm  Extremities: antalgic gait, ambulates with cane  Neurologic: Normal strength and sensation, no focal deficits  Psych: labile affect: agitation, anger, tears     Diagnoses and all orders for this visit:    1. Essential hypertension  -     METABOLIC PANEL, COMPREHENSIVE; Future  -     TSH 3RD GENERATION; Future    2. Hyperlipidemia, unspecified hyperlipidemia type  -     LIPID PANEL; Future  -     METABOLIC PANEL, COMPREHENSIVE; Future    3. Chronic obstructive pulmonary disease, unspecified COPD type ([facility] [address]  -     CBC W/O DIFF; Future  -     XR CHEST PA LAT; Future  -     fluticasone-umeclidinium-vilanterol (TRELEGY ELLIPTA) 100-62.5-25 mcg inhaler; Take 1 Puff by inhalation daily. 4. Benign prostatic hyperplasia with nocturia  -     PSA, DIAGNOSTIC (PROSTATE SPECIFIC AG); Future    5. Recurrent major depressive disorder, in partial remission ([address]    6. Daytime sleepiness  -     SLEEP MEDICINE REFERRAL    7. DDD (degenerative disc disease), lumbar  -     diazePAM (VALIUM) 5 mg tablet; TAKE ONE TABLET BY MOUTH EVERY TWELVE HOURS AS NEEDED FOR ANXIETY. max daily AMOUNT: 10 MG    8.  Anxiety  -     diazePAM (VALIUM) 5 mg tablet; TAKE ONE TABLET BY MOUTH EVERY TWELVE HOURS AS NEEDED FOR ANXIETY. max daily AMOUNT: 10 MG    9. Benzodiazepine contract exists  -     diazePAM (VALIUM) 5 mg tablet; TAKE ONE TABLET BY MOUTH EVERY TWELVE HOURS AS NEEDED FOR ANXIETY. max daily AMOUNT: 10 MG    Other orders  -     SAMPLES BEING HELD    Plan of care:  Diagnoses were discussed in detail with patient. Patient asked to monitor BP and bring log to follow up appointment. Medications reviewed and appropriate. Patient to continue current prescribed medications as written. Medication risks/benefits/side effects discussed with patient. All of the patient's questions were addressed and answered to apparent satisfaction. The patient understands and agrees with our plan of care. The patient knows to call back if they have questions about the plan of care or if symptoms change. The patient received an After-Visit Summary which contains VS, diagnoses, orders, allergy and medication lists. Future Appointments   Date Time Provider Marzena Chopra   4/13/2023  8:20 AM Estelle Venegas MD [facility] BS AMB         Follow-up and Dispositions    Return in about 3 months (around 4/4/2023), or if symptoms worsen or fail to improve. The following Annual Medicare Wellness Exam is distinct and separate from the medical evaluation and decision making. This is the Subsequent Medicare Annual Wellness Exam, performed 12 months or more after the Initial AWV or the last Subsequent AWV    I have reviewed the patient's medical history in detail and updated the computerized patient record. Assessment/Plan   Education and counseling provided:  Are appropriate based on today's review and evaluation  End-of-Life planning (with patient's consent)    1.  Medicare annual wellness visit, subsequent       Depression Risk Factor Screening     3 most recent PHQ Screens 1/4/2023   PHQ Not Done -   Little interest or pleasure in doing things Nearly every day   Feeling down, depressed, irritable, or hopeless Nearly every day   Total Score PHQ 2 6   Trouble falling or staying asleep, or sleeping too much Nearly every day   Feeling tired or having little energy Nearly every day   Poor appetite, weight loss, or overeating Nearly every day   Feeling bad about yourself - or that you are a failure or have let yourself or your family down Nearly every day   Trouble concentrating on things such as school, work, reading, or watching TV Nearly every day   Moving or speaking so slowly that other people could have noticed; or the opposite being so fidgety that others notice Nearly every day   Thoughts of being better off dead, or hurting yourself in some way Nearly every day   PHQ 9 Score 27   How difficult have these problems made it for you to do your work, take care of your home and get along with others -       Alcohol & Drug Abuse Risk Screen    Do you average more than 1 drink per night or more than 7 drinks a week: No    In the past three months have you have had more than 4 drinks containing alcohol on one occasion: No          Functional Ability and Level of Safety    Hearing: Hearing is good. Activities of Daily Living: The home contains: no safety equipment. Patient does total self care      Ambulation: with mild difficulty     Fall Risk:  Fall Risk Assessment, last 12 mths 1/4/2023   Able to walk? Yes   Fall in past 12 months? 0   Do you feel unsteady? 1   Are you worried about falling 1   Is the gait abnormal? 1   Number of falls in past 12 months 0   Fall with injury? -      Abuse Screen:  Patient is not abused       Cognitive Screening    Has your family/caregiver stated any concerns about your memory: no     Screening deferred.     Health Maintenance Due     Health Maintenance Due   Topic Date Due    COVID-19 Vaccine (1) Never done    Pneumococcal 65+ years (1 - PCV) Never done    Low dose CT lung screening  Never done    AAA Screening 73-67 YO Male Smoking Patients  Never done    Flu Vaccine (1) Never done    Medicare Yearly Exam  12/23/2022       Patient Care Team   Patient Care Team:  Sreekanth Davis MD as PCP - General (Family Medicine)  Sreekanth Davis MD as PCP - [facility]  Magi Lemon MD (Orthopedic Surgery)  Brice Solomon MD (Neurosurgery)  Vega Arcos MD (Orthopedic Surgery)  Juanjose Strong MD (Urology)    History     Patient Active Problem List   Diagnosis Code    Hyperlipemia E78.5    Insomnia G47.00    History of left hip replacement T29.031    Depression F32. A    Tobacco abuse Z72.0    Conductive hearing loss, bilateral H90.0    Chronic obstructive pulmonary disease (HCC) J44.9    Benzodiazepine contract exists Z79.899    Recurrent major depressive disorder, in partial remission ([facility] [address] F33.41     Past Medical History:   Diagnosis Date    Adverse effect of anesthesia     \"major fear of needles\"/raw feeling down throat    Arthritis     osteo    Chronic obstructive pulmonary disease (HCC)     Chronic pain     lower back - stenosis - had injections/steroids    Hypercholesterolemia     Ill-defined condition     low BP but not a problem    Psychiatric disorder     depression      Past Surgical History:   Procedure Laterality Date    COLONOSCOPY N/A 8/23/2016    COLONOSCOPY performed by Jason Savage MD at [facility] ENDOSCOPY    COLONOSCOPY N/A 9/18/2019    COLONOSCOPY performed by Amarilis Mendes MD at [facility] ENDOSCOPY    HX COLONOSCOPY      HX GI      surgery for hemorrhoids    HX HEENT      all teeth removed    HX ORTHOPAEDIC      left hip replacement    HX ORTHOPAEDIC      infection after sutured finger/then healed/then had a growth removed 2 1/2 yrs later - benign    HX OTHER SURGICAL      growth on left side of testicle removed - benign     Current Outpatient Medications   Medication Sig Dispense Refill    fluticasone-umeclidinium-vilanterol (TRELEGY ELLIPTA) 100-62.5-25 mcg inhaler Take 1 Puff by inhalation daily.  60 Each 2    diazePAM (VALIUM) 5 mg tablet TAKE ONE TABLET BY MOUTH EVERY TWELVE HOURS AS NEEDED FOR ANXIETY. max daily AMOUNT: 10 MG 30 Tablet 0    tamsulosin (FLOMAX) 0.4 mg capsule TAKE TWO CAPSULES BY MOUTH DAILY 180 Capsule 1    diclofenac EC (VOLTAREN) 75 mg EC tablet TAKE ONE TABLET BY MOUTH TWICE DAILY 180 Tablet 0    omeprazole (PRILOSEC) 20 mg capsule TAKE ONE CAPSULE BY MOUTH ONCE A DAY 90 Capsule 0    DULoxetine (CYMBALTA) 30 mg capsule TAKE ONE CAPSULE BY MOUTH ONCE A DAY 90 Capsule 0    buPROPion XL (WELLBUTRIN XL) 150 mg tablet TAKE ONE TABLET BY MOUTH EVERY DAY 90 Tablet 0    montelukast (SINGULAIR) 10 mg tablet TAKE ONE TABLET BY MOUTH EVERY DAY 90 Tablet 0    traZODone (DESYREL) 150 mg tablet Take one Tablet by mouth nightly. 90 Tablet 0    tiZANidine (ZANAFLEX) 4 mg tablet TAKE ONE TABLET BY MOUTH TWICE DAILY 180 Tablet 0    losartan (COZAAR) 50 mg tablet Take 1 Tablet by mouth daily.  90 Tablet 0    pravastatin (PRAVACHOL) 40 mg tablet TAKE ONE TABLET BY MOUTH EVERY NIGHT 90 Tablet 0    Ventolin HFA 90 mcg/actuation inhaler inhale 2 PUFFS by mouth EVERY 6 HOURS AS NEEDED FOR WHEEZING 18 g 0     Allergies   Allergen Reactions    Percocet [Oxycodone-Acetaminophen] Rash and Itching     itching       Family History   Problem Relation Age of Onset    Cancer Mother         breast    Stroke Mother         x2    Cancer Father         throat/lung    Heart Disease Father     Lung Disease Father     Thyroid Disease Father     Heart Attack Father     Suicide Brother     Psychiatric Disorder Brother     Heart Attack Paternal Uncle     Heart Attack Paternal Uncle         x3     Social History     Tobacco Use    Smoking status: Former     Packs/day: 0.50     Years: 45.00     Pack years: 22.50     Types: Cigarettes     Quit date: 12/15/2022     Years since quittin.0    Smokeless tobacco: Never   Substance Use Topics    Alcohol use: No         Suma Main MD

## 2023-01-05 LAB
ALBUMIN SERPL-MCNC: 4.2 G/DL (ref 3.5–5)
ALBUMIN/GLOB SERPL: 1.3 (ref 1.1–2.2)
ALP SERPL-CCNC: 89 U/L (ref 45–117)
ALT SERPL-CCNC: 30 U/L (ref 12–78)
ANION GAP SERPL CALC-SCNC: 3 MMOL/L (ref 5–15)
AST SERPL-CCNC: 16 U/L (ref 15–37)
BILIRUB SERPL-MCNC: 0.9 MG/DL (ref 0.2–1)
BUN SERPL-MCNC: 23 MG/DL (ref 6–20)
BUN/CREAT SERPL: 20 (ref 12–20)
CALCIUM SERPL-MCNC: 9.6 MG/DL (ref 8.5–10.1)
CHLORIDE SERPL-SCNC: 109 MMOL/L (ref 97–108)
CHOLEST SERPL-MCNC: 191 MG/DL
CO2 SERPL-SCNC: 27 MMOL/L (ref 21–32)
COMMENT, HOLDF: NORMAL
CREAT SERPL-MCNC: 1.16 MG/DL (ref 0.7–1.3)
ERYTHROCYTE [DISTWIDTH] IN BLOOD BY AUTOMATED COUNT: 13.2 % (ref 11.5–14.5)
GLOBULIN SER CALC-MCNC: 3.2 G/DL (ref 2–4)
GLUCOSE SERPL-MCNC: 79 MG/DL (ref 65–100)
HCT VFR BLD AUTO: 46.5 % (ref 36.6–50.3)
HDLC SERPL-MCNC: 46 MG/DL
HDLC SERPL: 4.2 (ref 0–5)
HGB BLD-MCNC: 14.9 G/DL (ref 12.1–17)
LDLC SERPL CALC-MCNC: 121 MG/DL (ref 0–100)
MCH RBC QN AUTO: 28.6 PG (ref 26–34)
MCHC RBC AUTO-ENTMCNC: 32 G/DL (ref 30–36.5)
MCV RBC AUTO: 89.3 FL (ref 80–99)
NRBC # BLD: 0 K/UL (ref 0–0.01)
NRBC BLD-RTO: 0 PER 100 WBC
PLATELET # BLD AUTO: 248 K/UL (ref 150–400)
PMV BLD AUTO: 11 FL (ref 8.9–12.9)
POTASSIUM SERPL-SCNC: 4.7 MMOL/L (ref 3.5–5.1)
PROT SERPL-MCNC: 7.4 G/DL (ref 6.4–8.2)
PSA SERPL-MCNC: 2.9 NG/ML (ref 0.01–4)
RBC # BLD AUTO: 5.21 M/UL (ref 4.1–5.7)
SAMPLES BEING HELD,HOLD: NORMAL
SODIUM SERPL-SCNC: 139 MMOL/L (ref 136–145)
TRIGL SERPL-MCNC: 120 MG/DL (ref ?–150)
TSH SERPL DL<=0.05 MIU/L-ACNC: 1.72 UIU/ML (ref 0.36–3.74)
VLDLC SERPL CALC-MCNC: 24 MG/DL
WBC # BLD AUTO: 8.8 K/UL (ref 4.1–11.1)

## 2023-01-11 ENCOUNTER — TELEPHONE (OUTPATIENT)
Dept: FAMILY MEDICINE CLINIC | Age: 68
End: 2023-01-11

## 2023-01-13 NOTE — TELEPHONE ENCOUNTER
Called patient. He was advised of labs \"Results acceptable when compared with prior lab values. \" Verbalized understanding.

## 2023-01-17 ENCOUNTER — TELEPHONE (OUTPATIENT)
Dept: FAMILY MEDICINE CLINIC | Age: 68
End: 2023-01-17

## 2023-01-17 NOTE — TELEPHONE ENCOUNTER
Pt would like to speak with the nurse about the Rx Trelegy. Pt thinks he is having side effects. States he can't breath right, and is unable to sleep. Please advise.

## 2023-01-17 NOTE — TELEPHONE ENCOUNTER
Called patient. He was advised to stop using Trelegy and use   Ventolin inhaler as needed. Patient stated he still is having trouble sleeping.

## 2023-01-18 NOTE — TELEPHONE ENCOUNTER
Called patient. He stated that he doesn't ever remember taking Melatonin but he will get some from the pharmacy and try it. Patient stated \"tell Dr Blank Funez I did sleep 5 hours last night. \"

## 2023-02-20 DIAGNOSIS — Z79.899 BENZODIAZEPINE CONTRACT EXISTS: ICD-10-CM

## 2023-02-20 DIAGNOSIS — F41.9 ANXIETY: ICD-10-CM

## 2023-02-20 DIAGNOSIS — M51.36 DDD (DEGENERATIVE DISC DISEASE), LUMBAR: ICD-10-CM

## 2023-02-22 RX ORDER — DIAZEPAM 5 MG/1
TABLET ORAL
Qty: 30 TABLET | Refills: 0 | Status: SHIPPED | OUTPATIENT
Start: 2023-02-22

## 2023-03-06 DIAGNOSIS — M51.36 DDD (DEGENERATIVE DISC DISEASE), LUMBAR: ICD-10-CM

## 2023-03-07 DIAGNOSIS — R35.1 BENIGN PROSTATIC HYPERPLASIA WITH NOCTURIA: ICD-10-CM

## 2023-03-07 DIAGNOSIS — N40.1 BENIGN PROSTATIC HYPERPLASIA WITH NOCTURIA: ICD-10-CM

## 2023-03-08 RX ORDER — TIZANIDINE 4 MG/1
TABLET ORAL
Qty: 180 TABLET | Refills: 0 | Status: SHIPPED | OUTPATIENT
Start: 2023-03-08

## 2023-03-09 RX ORDER — TAMSULOSIN HYDROCHLORIDE 0.4 MG/1
CAPSULE ORAL
Qty: 180 CAPSULE | Refills: 1 | Status: SHIPPED | OUTPATIENT
Start: 2023-03-09

## 2023-04-13 ENCOUNTER — OFFICE VISIT (OUTPATIENT)
Dept: FAMILY MEDICINE CLINIC | Age: 68
End: 2023-04-13
Payer: MEDICARE

## 2023-04-13 VITALS
BODY MASS INDEX: 22 KG/M2 | TEMPERATURE: 98.4 F | WEIGHT: 162.4 LBS | DIASTOLIC BLOOD PRESSURE: 86 MMHG | HEART RATE: 54 BPM | OXYGEN SATURATION: 98 % | HEIGHT: 72 IN | SYSTOLIC BLOOD PRESSURE: 149 MMHG | RESPIRATION RATE: 18 BRPM

## 2023-04-13 DIAGNOSIS — I10 ESSENTIAL HYPERTENSION: ICD-10-CM

## 2023-04-13 DIAGNOSIS — J44.9 CHRONIC OBSTRUCTIVE PULMONARY DISEASE, UNSPECIFIED COPD TYPE (HCC): ICD-10-CM

## 2023-04-13 DIAGNOSIS — J44.1 COPD EXACERBATION (HCC): Primary | ICD-10-CM

## 2023-04-13 DIAGNOSIS — F41.9 ANXIETY: ICD-10-CM

## 2023-04-13 DIAGNOSIS — K64.0 FIRST DEGREE HEMORRHOIDS: ICD-10-CM

## 2023-04-13 PROCEDURE — G8536 NO DOC ELDER MAL SCRN: HCPCS | Performed by: FAMILY MEDICINE

## 2023-04-13 PROCEDURE — 1123F ACP DISCUSS/DSCN MKR DOCD: CPT | Performed by: FAMILY MEDICINE

## 2023-04-13 PROCEDURE — 99214 OFFICE O/P EST MOD 30 MIN: CPT | Performed by: FAMILY MEDICINE

## 2023-04-13 PROCEDURE — 3074F SYST BP LT 130 MM HG: CPT | Performed by: FAMILY MEDICINE

## 2023-04-13 PROCEDURE — 3017F COLORECTAL CA SCREEN DOC REV: CPT | Performed by: FAMILY MEDICINE

## 2023-04-13 PROCEDURE — G9717 DOC PT DX DEP/BP F/U NT REQ: HCPCS | Performed by: FAMILY MEDICINE

## 2023-04-13 PROCEDURE — 1101F PT FALLS ASSESS-DOCD LE1/YR: CPT | Performed by: FAMILY MEDICINE

## 2023-04-13 PROCEDURE — G8427 DOCREV CUR MEDS BY ELIG CLIN: HCPCS | Performed by: FAMILY MEDICINE

## 2023-04-13 PROCEDURE — 3078F DIAST BP <80 MM HG: CPT | Performed by: FAMILY MEDICINE

## 2023-04-13 PROCEDURE — G8420 CALC BMI NORM PARAMETERS: HCPCS | Performed by: FAMILY MEDICINE

## 2023-04-13 RX ORDER — ALBUTEROL SULFATE 90 UG/1
AEROSOL, METERED RESPIRATORY (INHALATION)
Qty: 18 G | Refills: 0 | Status: SHIPPED | OUTPATIENT
Start: 2023-04-13

## 2023-04-13 RX ORDER — PREDNISONE 20 MG/1
20 TABLET ORAL 2 TIMES DAILY
Qty: 10 TABLET | Refills: 0 | Status: SHIPPED | OUTPATIENT
Start: 2023-04-13 | End: 2023-04-18

## 2023-04-13 RX ORDER — HYDROCORTISONE 25 MG/G
CREAM TOPICAL 2 TIMES DAILY
Qty: 30 G | Refills: 1 | Status: SHIPPED | OUTPATIENT
Start: 2023-04-13

## 2023-04-20 NOTE — PROGRESS NOTES
Chief Complaint   Patient presents with    Follow Up Chronic Condition     3 mo    Shortness of Breath     Shortness of breath has gotten worse. Nausea     Nausea at times. Water take the nausea away. he is a 69y.o. year old male who presents for follow up of his chronic health conditions. Patient with hx of COPD, insomnia, depression, HLD and chronic LBP. He complains of SOB. And nausea. BP elevated today. BP Readings from Last 3 Encounters:   04/13/23 (!) 149/86   01/04/23 (!) 156/91   09/23/22 (!) 166/93     Wt Readings from Last 3 Encounters:   04/13/23 162 lb 6.4 oz (73.7 kg)   01/04/23 157 lb 9.6 oz (71.5 kg)   09/23/22 146 lb 9.6 oz (66.5 kg)     Body mass index is 22.03 kg/m². Hyperlipidemia    Cardiovascular risks for him are: hyperlipidemia, smoker. Currently he takes Pravachol (pravastatin)     Lab Results   Component Value Date/Time    Cholesterol, total 191 01/04/2023 09:23 AM    HDL Cholesterol 46 01/04/2023 09:23 AM    LDL, calculated 121 (H) 01/04/2023 09:23 AM    Triglyceride 120 01/04/2023 09:23 AM    CHOL/HDL Ratio 4.2 01/04/2023 09:23 AM     Lab Results   Component Value Date/Time    Sodium 139 01/04/2023 09:23 AM    Potassium 4.7 01/04/2023 09:23 AM    Chloride 109 (H) 01/04/2023 09:23 AM    CO2 27 01/04/2023 09:23 AM    Anion gap 3 (L) 01/04/2023 09:23 AM    Glucose 79 01/04/2023 09:23 AM    BUN 23 (H) 01/04/2023 09:23 AM    Creatinine 1.16 01/04/2023 09:23 AM    BUN/Creatinine ratio 20 01/04/2023 09:23 AM    GFR est AA >60 08/31/2021 09:40 AM    GFR est non-AA >60 08/31/2021 09:40 AM    Calcium 9.6 01/04/2023 09:23 AM    Bilirubin, total 0.9 01/04/2023 09:23 AM    ALT (SGPT) 30 01/04/2023 09:23 AM    Alk.  phosphatase 89 01/04/2023 09:23 AM    Protein, total 7.4 01/04/2023 09:23 AM    Albumin 4.2 01/04/2023 09:23 AM    Globulin 3.2 01/04/2023 09:23 AM    A-G Ratio 1.3 01/04/2023 09:23 AM      Our goal is to lower hyperlipidemia to decrease the risks of strokes and heart attacks      Patient Active Problem List   Diagnosis Code    Hyperlipemia E78.5    Insomnia G47.00    History of left hip replacement X43.004    Depression F32. A    Tobacco abuse Z72.0    Conductive hearing loss, bilateral H90.0    Chronic obstructive pulmonary disease (HCC) J44.9    Benzodiazepine contract exists Z79.899    Recurrent major depressive disorder, in partial remission ([facility] [address] F33.41     Past Surgical History:   Procedure Laterality Date    COLONOSCOPY N/A 2016    COLONOSCOPY performed by Chino Stuart MD at [facility] ENDOSCOPY    COLONOSCOPY N/A 2019    COLONOSCOPY performed by Zohra Hammer MD at [facility] ENDOSCOPY    HX COLONOSCOPY      HX GI      surgery for hemorrhoids    HX HEENT      all teeth removed    HX ORTHOPAEDIC      left hip replacement    HX ORTHOPAEDIC      infection after sutured finger/then healed/then had a growth removed 2 1/2 yrs later - benign    HX OTHER SURGICAL      growth on left side of testicle removed - benign     Social History     Socioeconomic History    Marital status: SINGLE     Spouse name: Not on file    Number of children: Not on file    Years of education: Not on file    Highest education level: Not on file   Occupational History    Not on file   Tobacco Use    Smoking status: Former     Packs/day: 0.50     Years: 45.00     Pack years: 22.50     Types: Cigarettes     Quit date: 12/15/2022     Years since quittin.3    Smokeless tobacco: Never   Substance and Sexual Activity    Alcohol use: No    Drug use: No    Sexual activity: Never   Other Topics Concern    Not on file   Social History Narrative    Not on file     Social Determinants of Health     Financial Resource Strain: High Risk    Difficulty of Paying Living Expenses: Hard   Food Insecurity: No Food Insecurity    Worried About Running Out of Food in the Last Year: Never true    Ran Out of Food in the Last Year: Never true   Transportation Needs: Not on file   Physical Activity: Not on file   Stress: Not on file   Social Connections: Not on file   Intimate Partner Violence: Not on file   Housing Stability: Not on file     Family History   Problem Relation Age of Onset    Cancer Mother         breast    Stroke Mother         x2    Cancer Father         throat/lung    Heart Disease Father     Lung Disease Father     Thyroid Disease Father     Heart Attack Father     Suicide Brother     Psychiatric Disorder Brother     Heart Attack Paternal Uncle     Heart Attack Paternal Uncle         x3     Current Outpatient Medications   Medication Sig    hydrocortisone (Anusol-HC) 2.5 % topical cream Apply  to affected area two (2) times a day. use thin layer    Ventolin HFA 90 mcg/actuation inhaler inhale 2 PUFFS by mouth EVERY 6 HOURS AS NEEDED FOR WHEEZING    tamsulosin (FLOMAX) 0.4 mg capsule TAKE TWO CAPSULES BY MOUTH DAILY    tiZANidine (ZANAFLEX) 4 mg tablet TAKE ONE TABLET BY MOUTH TWICE DAILY    diazePAM (VALIUM) 5 mg tablet TAKE ONE TABLET BY MOUTH EVERY 12 HOURS AS NEEDED FOR ANXIETY. max daily AMOUNT: 10 MG    diclofenac EC (VOLTAREN) 75 mg EC tablet TAKE ONE TABLET BY MOUTH TWICE DAILY    omeprazole (PRILOSEC) 20 mg capsule TAKE ONE CAPSULE BY MOUTH ONCE A DAY    DULoxetine (CYMBALTA) 30 mg capsule TAKE ONE CAPSULE BY MOUTH ONCE A DAY    buPROPion XL (WELLBUTRIN XL) 150 mg tablet TAKE ONE TABLET BY MOUTH EVERY DAY    montelukast (SINGULAIR) 10 mg tablet TAKE ONE TABLET BY MOUTH EVERY DAY    traZODone (DESYREL) 150 mg tablet Take one Tablet by mouth nightly. losartan (COZAAR) 50 mg tablet Take 1 Tablet by mouth daily. pravastatin (PRAVACHOL) 40 mg tablet TAKE ONE TABLET BY MOUTH EVERY NIGHT    fluticasone-umeclidinium-vilanterol (TRELEGY ELLIPTA) 100-62.5-25 mcg inhaler Take 1 Puff by inhalation daily. (Patient not taking: Reported on 4/13/2023)     No current facility-administered medications for this visit.      Allergies   Allergen Reactions    Percocet [Oxycodone-Acetaminophen] Rash and Itching     itching Review of Systems:  Constitutional: Negative for fatigue, malaise  Derm: Negative for rash or lesion  HEENT: Negative for acute hearing changes  Resp: see HPI  CV: Negative for dizziness or palpitations  Gastrointestinal: Negative for nausea or abdominal pain  Genital/urinary: frequent urination  MS: see HPI, hx of LBP  Neuro: Negative for HA, weakness or paresthesia  Psych: History of anxiety, depression and insomnia     Vitals:    04/13/23 0816 04/13/23 0831 04/13/23 0843   BP: (!) 165/99 (!) 149/86    Pulse: 70  (!) 54   Resp: 18     Temp: 98.4 °F (36.9 °C)     TempSrc: Oral     SpO2: 98%  98%   Weight: 162 lb 6.4 oz (73.7 kg)     Height: 6' (1.829 m)         Physical Examination:  General: thin, appears distressed  Head: Normocephalic, atraumatic  Eyes: Sclera clear, EOMI  Neck: Normal range of motion  Respiratory: CTAB with symmetrical, unlabored effort  Cardiovascular: Normal S1, S2, Regular rate and rhythm  Extremities: antalgic gait, ambulates with cane  Neurologic: Normal strength and sensation, no focal deficits  Psych: affect appropriate    Diagnoses and all orders for this visit:    1. Chronic obstructive pulmonary disease, unspecified COPD type ([address]  -     XR CHEST PA LAT; Future    2. Essential hypertension    3. Anxiety    4. First degree hemorrhoids  -     hydrocortisone (Anusol-HC) 2.5 % topical cream; Apply  to affected area two (2) times a day. use thin layer    Other orders  -     Ventolin HFA 90 mcg/actuation inhaler; inhale 2 PUFFS by mouth EVERY 6 HOURS AS NEEDED FOR WHEEZING  -     predniSONE (DELTASONE) 20 mg tablet; Take 1 Tablet by mouth two (2) times a day for 5 days. Plan of care:  Diagnoses were discussed in detail with patient. Patient asked to monitor BP and bring log to follow up appointment. Medications reviewed and appropriate. Patient to continue current prescribed medications as written. Medication risks/benefits/side effects discussed with patient.    All of the patient's questions were addressed and answered to apparent satisfaction. The patient understands and agrees with our plan of care. The patient knows to call back if they have questions about the plan of care or if symptoms change. The patient received an After-Visit Summary which contains VS, diagnoses, orders, allergy and medication lists. Future Appointments   Date Time Provider Marzena Chopra   7/13/2023  9:20 AM Yandel Arcos MD [facility]         Follow-up and Dispositions    Return in about 3 months (around 7/13/2023), or if symptoms worsen or fail to improve.

## 2023-05-18 RX ORDER — DULOXETIN HYDROCHLORIDE 30 MG/1
CAPSULE, DELAYED RELEASE ORAL
COMMUNITY
Start: 2022-12-14 | End: 2023-06-03

## 2023-05-18 RX ORDER — MONTELUKAST SODIUM 10 MG/1
1 TABLET ORAL DAILY
COMMUNITY
Start: 2022-12-14 | End: 2023-06-03

## 2023-05-18 RX ORDER — LOSARTAN POTASSIUM 50 MG/1
1 TABLET ORAL DAILY
COMMUNITY
Start: 2022-12-14 | End: 2023-06-03

## 2023-05-18 RX ORDER — TAMSULOSIN HYDROCHLORIDE 0.4 MG/1
2 CAPSULE ORAL DAILY
COMMUNITY
Start: 2023-03-09

## 2023-05-18 RX ORDER — TRAZODONE HYDROCHLORIDE 150 MG/1
1 TABLET ORAL NIGHTLY
COMMUNITY
Start: 2022-12-14 | End: 2023-06-03

## 2023-05-18 RX ORDER — DICLOFENAC SODIUM 75 MG/1
1 TABLET, DELAYED RELEASE ORAL 2 TIMES DAILY
COMMUNITY
Start: 2022-12-14 | End: 2023-06-03

## 2023-05-18 RX ORDER — PRAVASTATIN SODIUM 40 MG
1 TABLET ORAL NIGHTLY
COMMUNITY
Start: 2022-12-14 | End: 2023-06-03

## 2023-05-18 RX ORDER — BUPROPION HYDROCHLORIDE 150 MG/1
1 TABLET ORAL DAILY
COMMUNITY
Start: 2022-12-14 | End: 2023-06-03

## 2023-05-18 RX ORDER — OMEPRAZOLE 20 MG/1
CAPSULE, DELAYED RELEASE ORAL
COMMUNITY
Start: 2022-12-14 | End: 2023-06-03

## 2023-05-18 RX ORDER — DIAZEPAM 5 MG/1
TABLET ORAL
COMMUNITY
Start: 2023-02-22 | End: 2023-06-03

## 2023-05-18 RX ORDER — ALBUTEROL SULFATE 90 UG/1
2 AEROSOL, METERED RESPIRATORY (INHALATION) EVERY 6 HOURS PRN
COMMUNITY
Start: 2023-04-13

## 2023-05-18 RX ORDER — TIZANIDINE 4 MG/1
1 TABLET ORAL 2 TIMES DAILY
COMMUNITY
Start: 2023-03-08 | End: 2023-07-11

## 2023-05-25 ENCOUNTER — TELEPHONE (OUTPATIENT)
Facility: CLINIC | Age: 68
End: 2023-05-25

## 2023-05-25 NOTE — TELEPHONE ENCOUNTER
Called patient, mailbox not set up. If patient returns call, need more info to determine treatment (how long? Pain? How many times per day?  Any other symptoms?)

## 2023-05-25 NOTE — TELEPHONE ENCOUNTER
Pt is having stomach issues. Needs to speak. He wants to know what to take OTC for diarrhea. He does not want an interaction with his regular medications. Please call by 9:00 a. If possible.

## 2023-05-30 DIAGNOSIS — F51.01 PRIMARY INSOMNIA: ICD-10-CM

## 2023-05-30 DIAGNOSIS — F33.41 MAJOR DEPRESSIVE DISORDER, RECURRENT, IN PARTIAL REMISSION (HCC): ICD-10-CM

## 2023-05-30 DIAGNOSIS — E78.5 HYPERLIPIDEMIA, UNSPECIFIED: ICD-10-CM

## 2023-05-30 DIAGNOSIS — M51.36 OTHER INTERVERTEBRAL DISC DEGENERATION, LUMBAR REGION: ICD-10-CM

## 2023-05-30 DIAGNOSIS — J44.9 CHRONIC OBSTRUCTIVE PULMONARY DISEASE, UNSPECIFIED (HCC): ICD-10-CM

## 2023-05-30 DIAGNOSIS — K21.9 GASTRO-ESOPHAGEAL REFLUX DISEASE WITHOUT ESOPHAGITIS: ICD-10-CM

## 2023-05-30 DIAGNOSIS — I10 ESSENTIAL (PRIMARY) HYPERTENSION: ICD-10-CM

## 2023-05-30 DIAGNOSIS — F41.9 ANXIETY DISORDER, UNSPECIFIED: ICD-10-CM

## 2023-06-03 RX ORDER — OMEPRAZOLE 20 MG/1
CAPSULE, DELAYED RELEASE ORAL
Qty: 90 CAPSULE | Refills: 0 | Status: SHIPPED | OUTPATIENT
Start: 2023-06-03

## 2023-06-03 RX ORDER — DICLOFENAC SODIUM 75 MG/1
TABLET, DELAYED RELEASE ORAL
Qty: 180 TABLET | Refills: 0 | Status: SHIPPED | OUTPATIENT
Start: 2023-06-03

## 2023-06-03 RX ORDER — DULOXETIN HYDROCHLORIDE 30 MG/1
CAPSULE, DELAYED RELEASE ORAL
Qty: 90 CAPSULE | Refills: 0 | Status: SHIPPED | OUTPATIENT
Start: 2023-06-03

## 2023-06-03 RX ORDER — MONTELUKAST SODIUM 10 MG/1
TABLET ORAL
Qty: 90 TABLET | Refills: 0 | Status: SHIPPED | OUTPATIENT
Start: 2023-06-03

## 2023-06-03 RX ORDER — BUPROPION HYDROCHLORIDE 150 MG/1
TABLET ORAL
Qty: 90 TABLET | Refills: 0 | Status: SHIPPED | OUTPATIENT
Start: 2023-06-03

## 2023-06-03 RX ORDER — LOSARTAN POTASSIUM 50 MG/1
TABLET ORAL
Qty: 90 TABLET | Refills: 0 | Status: SHIPPED | OUTPATIENT
Start: 2023-06-03

## 2023-06-03 RX ORDER — DIAZEPAM 5 MG/1
TABLET ORAL
Qty: 30 TABLET | Refills: 0 | Status: SHIPPED | OUTPATIENT
Start: 2023-06-03 | End: 2023-07-03

## 2023-06-03 RX ORDER — TRAZODONE HYDROCHLORIDE 150 MG/1
TABLET ORAL
Qty: 90 TABLET | Refills: 0 | Status: SHIPPED | OUTPATIENT
Start: 2023-06-03

## 2023-06-03 RX ORDER — PRAVASTATIN SODIUM 40 MG
TABLET ORAL
Qty: 90 TABLET | Refills: 0 | Status: SHIPPED | OUTPATIENT
Start: 2023-06-03

## 2023-06-26 ENCOUNTER — TELEPHONE (OUTPATIENT)
Facility: CLINIC | Age: 68
End: 2023-06-26

## 2023-07-05 DIAGNOSIS — K64.0 FIRST DEGREE HEMORRHOIDS: ICD-10-CM

## 2023-07-10 DIAGNOSIS — M51.36 OTHER INTERVERTEBRAL DISC DEGENERATION, LUMBAR REGION: ICD-10-CM

## 2023-07-10 DIAGNOSIS — M48.061 SPINAL STENOSIS, LUMBAR REGION WITHOUT NEUROGENIC CLAUDICATION: ICD-10-CM

## 2023-07-11 RX ORDER — PREDNISONE 20 MG/1
TABLET ORAL
Qty: 10 TABLET | Refills: 0 | OUTPATIENT
Start: 2023-07-11

## 2023-07-11 RX ORDER — TIZANIDINE 4 MG/1
TABLET ORAL
Qty: 180 TABLET | Refills: 0 | Status: SHIPPED | OUTPATIENT
Start: 2023-07-11

## 2023-08-11 ENCOUNTER — OFFICE VISIT (OUTPATIENT)
Facility: CLINIC | Age: 68
End: 2023-08-11
Payer: MEDICARE

## 2023-08-11 VITALS
HEART RATE: 65 BPM | SYSTOLIC BLOOD PRESSURE: 159 MMHG | WEIGHT: 151.4 LBS | BODY MASS INDEX: 20.51 KG/M2 | TEMPERATURE: 97.8 F | HEIGHT: 72 IN | DIASTOLIC BLOOD PRESSURE: 92 MMHG | OXYGEN SATURATION: 99 %

## 2023-08-11 DIAGNOSIS — R10.12 LEFT UPPER QUADRANT ABDOMINAL PAIN: Primary | ICD-10-CM

## 2023-08-11 DIAGNOSIS — Z82.0: ICD-10-CM

## 2023-08-11 DIAGNOSIS — F51.01 PRIMARY INSOMNIA: ICD-10-CM

## 2023-08-11 DIAGNOSIS — I10 ESSENTIAL (PRIMARY) HYPERTENSION: ICD-10-CM

## 2023-08-11 PROCEDURE — 3078F DIAST BP <80 MM HG: CPT | Performed by: FAMILY MEDICINE

## 2023-08-11 PROCEDURE — 1123F ACP DISCUSS/DSCN MKR DOCD: CPT | Performed by: FAMILY MEDICINE

## 2023-08-11 PROCEDURE — 3074F SYST BP LT 130 MM HG: CPT | Performed by: FAMILY MEDICINE

## 2023-08-11 PROCEDURE — 99214 OFFICE O/P EST MOD 30 MIN: CPT | Performed by: FAMILY MEDICINE

## 2023-08-11 RX ORDER — DOCUSATE SODIUM 100 MG/1
100 CAPSULE, LIQUID FILLED ORAL 2 TIMES DAILY
Qty: 60 CAPSULE | Refills: 2 | Status: SHIPPED | OUTPATIENT
Start: 2023-08-11

## 2023-08-14 DIAGNOSIS — F41.9 ANXIETY DISORDER, UNSPECIFIED: ICD-10-CM

## 2023-08-14 DIAGNOSIS — M51.36 OTHER INTERVERTEBRAL DISC DEGENERATION, LUMBAR REGION: ICD-10-CM

## 2023-08-15 RX ORDER — DIAZEPAM 5 MG/1
TABLET ORAL
Qty: 30 TABLET | Refills: 0 | Status: SHIPPED | OUTPATIENT
Start: 2023-08-15 | End: 2023-09-12

## 2023-08-16 ENCOUNTER — TELEPHONE (OUTPATIENT)
Facility: CLINIC | Age: 68
End: 2023-08-16

## 2023-08-16 NOTE — TELEPHONE ENCOUNTER
Patient came into office with letter from [facility]. Patient asking for a note to excuse him from jury duty.

## 2023-08-18 ENCOUNTER — TELEPHONE (OUTPATIENT)
Facility: CLINIC | Age: 68
End: 2023-08-18

## 2023-08-18 NOTE — TELEPHONE ENCOUNTER
Pt states he needs Dr Christopher Noble to write a letter to the [facility] to dismiss him from jury duty. Pt would like a call when the letter has been sent. Please advise.

## 2023-08-23 ENCOUNTER — TELEPHONE (OUTPATIENT)
Facility: CLINIC | Age: 68
End: 2023-08-23

## 2023-08-23 NOTE — TELEPHONE ENCOUNTER
Called patient. He stated he rec'd a letter from [employer]kew stating his letter was denied to dismiss from jury duty. Patient asked to bring letter to office for review. Patient stated he will bring letter Friday morning. Oumou Lockhart

## 2023-08-23 NOTE — TELEPHONE ENCOUNTER
Pt would like a call back about his jury duty letter. Pt requests to speak to the nurse. Please advise.

## 2023-08-28 DIAGNOSIS — K21.9 GASTRO-ESOPHAGEAL REFLUX DISEASE WITHOUT ESOPHAGITIS: ICD-10-CM

## 2023-08-28 DIAGNOSIS — J44.9 CHRONIC OBSTRUCTIVE PULMONARY DISEASE, UNSPECIFIED (HCC): ICD-10-CM

## 2023-08-28 DIAGNOSIS — M51.36 OTHER INTERVERTEBRAL DISC DEGENERATION, LUMBAR REGION: ICD-10-CM

## 2023-08-28 DIAGNOSIS — E78.5 HYPERLIPIDEMIA, UNSPECIFIED: ICD-10-CM

## 2023-08-28 DIAGNOSIS — F33.41 MAJOR DEPRESSIVE DISORDER, RECURRENT, IN PARTIAL REMISSION (HCC): ICD-10-CM

## 2023-08-28 DIAGNOSIS — I10 ESSENTIAL (PRIMARY) HYPERTENSION: ICD-10-CM

## 2023-08-29 RX ORDER — BUPROPION HYDROCHLORIDE 150 MG/1
150 TABLET ORAL DAILY
Qty: 90 TABLET | Refills: 0 | Status: SHIPPED | OUTPATIENT
Start: 2023-08-29

## 2023-08-29 RX ORDER — LOSARTAN POTASSIUM 50 MG/1
50 TABLET ORAL DAILY
Qty: 90 TABLET | Refills: 0 | Status: SHIPPED | OUTPATIENT
Start: 2023-08-29

## 2023-08-29 RX ORDER — OMEPRAZOLE 20 MG/1
CAPSULE, DELAYED RELEASE ORAL
Qty: 90 CAPSULE | Refills: 0 | Status: SHIPPED | OUTPATIENT
Start: 2023-08-29

## 2023-08-29 RX ORDER — PRAVASTATIN SODIUM 40 MG
40 TABLET ORAL NIGHTLY
Qty: 90 TABLET | Refills: 0 | Status: SHIPPED | OUTPATIENT
Start: 2023-08-29

## 2023-08-29 RX ORDER — MONTELUKAST SODIUM 10 MG/1
10 TABLET ORAL DAILY
Qty: 90 TABLET | Refills: 0 | Status: SHIPPED | OUTPATIENT
Start: 2023-08-29

## 2023-08-29 RX ORDER — DICLOFENAC SODIUM 75 MG/1
75 TABLET, DELAYED RELEASE ORAL 2 TIMES DAILY
Qty: 180 TABLET | Refills: 0 | Status: SHIPPED | OUTPATIENT
Start: 2023-08-29

## 2023-08-29 RX ORDER — DULOXETIN HYDROCHLORIDE 30 MG/1
CAPSULE, DELAYED RELEASE ORAL
Qty: 90 CAPSULE | Refills: 0 | Status: SHIPPED | OUTPATIENT
Start: 2023-08-29

## 2023-09-21 DIAGNOSIS — F51.01 PRIMARY INSOMNIA: ICD-10-CM

## 2023-09-21 NOTE — TELEPHONE ENCOUNTER
Pt will not be able to get any of his medications until the first of the month. He will ask the Pharmacy to hold them for him. The other medication that needs to be added to that is his     traZODone (DESYREL) 150 MG tablet    That way he will be able to get them all at the same time. Pt is very upset to be going so long without any of his medications. He is upset but he wants you to be aware of his situation.

## 2023-09-22 RX ORDER — TRAZODONE HYDROCHLORIDE 150 MG/1
150 TABLET ORAL NIGHTLY
Qty: 90 TABLET | Refills: 1 | Status: SHIPPED | OUTPATIENT
Start: 2023-09-22

## 2023-09-22 NOTE — TELEPHONE ENCOUNTER
Called patient. He was advised Rx sent to pharmacy. He stated he will  his medications after he gets his check.

## 2023-09-22 NOTE — TELEPHONE ENCOUNTER
7 of his medications were refilled on 8/29/23 for 90 days. I'm not sure what the problem is with the pharmacy. Please call patient.

## 2023-09-28 DIAGNOSIS — N40.1 BENIGN PROSTATIC HYPERPLASIA WITH LOWER URINARY TRACT SYMPTOMS: ICD-10-CM

## 2023-09-28 RX ORDER — TAMSULOSIN HYDROCHLORIDE 0.4 MG/1
0.8 CAPSULE ORAL DAILY
Qty: 180 CAPSULE | Refills: 1 | Status: SHIPPED | OUTPATIENT
Start: 2023-09-28

## 2023-11-10 ENCOUNTER — OFFICE VISIT (OUTPATIENT)
Facility: CLINIC | Age: 68
End: 2023-11-10
Payer: MEDICARE

## 2023-11-10 VITALS
TEMPERATURE: 98.5 F | HEART RATE: 61 BPM | OXYGEN SATURATION: 98 % | HEIGHT: 72 IN | RESPIRATION RATE: 16 BRPM | DIASTOLIC BLOOD PRESSURE: 97 MMHG | SYSTOLIC BLOOD PRESSURE: 157 MMHG | WEIGHT: 146.4 LBS | BODY MASS INDEX: 19.83 KG/M2

## 2023-11-10 DIAGNOSIS — F41.3 OTHER MIXED ANXIETY DISORDERS: ICD-10-CM

## 2023-11-10 DIAGNOSIS — I10 ESSENTIAL (PRIMARY) HYPERTENSION: Primary | ICD-10-CM

## 2023-11-10 DIAGNOSIS — K64.0 FIRST DEGREE HEMORRHOIDS: ICD-10-CM

## 2023-11-10 DIAGNOSIS — Z79.899 BENZODIAZEPINE CONTRACT EXISTS: ICD-10-CM

## 2023-11-10 DIAGNOSIS — F33.41 MAJOR DEPRESSIVE DISORDER, RECURRENT, IN PARTIAL REMISSION (HCC): ICD-10-CM

## 2023-11-10 DIAGNOSIS — M51.36 OTHER INTERVERTEBRAL DISC DEGENERATION, LUMBAR REGION: ICD-10-CM

## 2023-11-10 PROCEDURE — 3077F SYST BP >= 140 MM HG: CPT | Performed by: FAMILY MEDICINE

## 2023-11-10 PROCEDURE — 3080F DIAST BP >= 90 MM HG: CPT | Performed by: FAMILY MEDICINE

## 2023-11-10 PROCEDURE — 1123F ACP DISCUSS/DSCN MKR DOCD: CPT | Performed by: FAMILY MEDICINE

## 2023-11-10 PROCEDURE — 99214 OFFICE O/P EST MOD 30 MIN: CPT | Performed by: FAMILY MEDICINE

## 2023-11-27 DIAGNOSIS — E78.5 HYPERLIPIDEMIA, UNSPECIFIED: ICD-10-CM

## 2023-11-27 DIAGNOSIS — M51.36 OTHER INTERVERTEBRAL DISC DEGENERATION, LUMBAR REGION: ICD-10-CM

## 2023-11-27 DIAGNOSIS — F41.9 ANXIETY DISORDER, UNSPECIFIED: ICD-10-CM

## 2023-11-27 DIAGNOSIS — J44.9 CHRONIC OBSTRUCTIVE PULMONARY DISEASE, UNSPECIFIED (HCC): ICD-10-CM

## 2023-11-27 DIAGNOSIS — I10 ESSENTIAL (PRIMARY) HYPERTENSION: ICD-10-CM

## 2023-11-27 DIAGNOSIS — K21.9 GASTRO-ESOPHAGEAL REFLUX DISEASE WITHOUT ESOPHAGITIS: ICD-10-CM

## 2023-11-27 DIAGNOSIS — F33.41 MAJOR DEPRESSIVE DISORDER, RECURRENT, IN PARTIAL REMISSION (HCC): ICD-10-CM

## 2023-11-28 RX ORDER — LOSARTAN POTASSIUM 50 MG/1
50 TABLET ORAL DAILY
Qty: 90 TABLET | Refills: 0 | Status: SHIPPED | OUTPATIENT
Start: 2023-11-28

## 2023-11-28 RX ORDER — DIAZEPAM 5 MG/1
TABLET ORAL
Qty: 30 TABLET | Refills: 0 | Status: SHIPPED | OUTPATIENT
Start: 2023-11-28 | End: 2023-12-26

## 2023-11-28 RX ORDER — DULOXETIN HYDROCHLORIDE 30 MG/1
CAPSULE, DELAYED RELEASE ORAL
Qty: 90 CAPSULE | Refills: 0 | Status: SHIPPED | OUTPATIENT
Start: 2023-11-28

## 2023-11-28 RX ORDER — PRAVASTATIN SODIUM 40 MG
40 TABLET ORAL NIGHTLY
Qty: 90 TABLET | Refills: 0 | Status: SHIPPED | OUTPATIENT
Start: 2023-11-28

## 2023-11-28 RX ORDER — OMEPRAZOLE 20 MG/1
CAPSULE, DELAYED RELEASE ORAL
Qty: 90 CAPSULE | Refills: 0 | Status: SHIPPED | OUTPATIENT
Start: 2023-11-28

## 2023-11-28 RX ORDER — DICLOFENAC SODIUM 75 MG/1
75 TABLET, DELAYED RELEASE ORAL 2 TIMES DAILY
Qty: 180 TABLET | Refills: 0 | Status: SHIPPED | OUTPATIENT
Start: 2023-11-28

## 2023-11-28 RX ORDER — BUPROPION HYDROCHLORIDE 150 MG/1
150 TABLET ORAL DAILY
Qty: 90 TABLET | Refills: 0 | Status: SHIPPED | OUTPATIENT
Start: 2023-11-28

## 2023-11-28 RX ORDER — MONTELUKAST SODIUM 10 MG/1
10 TABLET ORAL DAILY
Qty: 90 TABLET | Refills: 0 | Status: SHIPPED | OUTPATIENT
Start: 2023-11-28

## 2024-01-02 DIAGNOSIS — F51.01 PRIMARY INSOMNIA: ICD-10-CM

## 2024-01-02 DIAGNOSIS — F41.9 ANXIETY DISORDER, UNSPECIFIED: ICD-10-CM

## 2024-01-02 DIAGNOSIS — I10 ESSENTIAL (PRIMARY) HYPERTENSION: ICD-10-CM

## 2024-01-02 DIAGNOSIS — F33.41 MAJOR DEPRESSIVE DISORDER, RECURRENT, IN PARTIAL REMISSION (HCC): ICD-10-CM

## 2024-01-02 DIAGNOSIS — E78.5 HYPERLIPIDEMIA, UNSPECIFIED: ICD-10-CM

## 2024-01-02 DIAGNOSIS — K21.9 GASTRO-ESOPHAGEAL REFLUX DISEASE WITHOUT ESOPHAGITIS: ICD-10-CM

## 2024-01-02 DIAGNOSIS — J44.9 CHRONIC OBSTRUCTIVE PULMONARY DISEASE, UNSPECIFIED (HCC): ICD-10-CM

## 2024-01-02 DIAGNOSIS — M51.36 OTHER INTERVERTEBRAL DISC DEGENERATION, LUMBAR REGION: ICD-10-CM

## 2024-01-02 RX ORDER — OMEPRAZOLE 20 MG/1
CAPSULE, DELAYED RELEASE ORAL
Qty: 90 CAPSULE | Refills: 0 | Status: SHIPPED | OUTPATIENT
Start: 2024-01-02

## 2024-01-02 RX ORDER — DICLOFENAC SODIUM 75 MG/1
75 TABLET, DELAYED RELEASE ORAL 2 TIMES DAILY
Qty: 180 TABLET | Refills: 0 | Status: SHIPPED | OUTPATIENT
Start: 2024-01-02

## 2024-01-02 RX ORDER — PRAVASTATIN SODIUM 40 MG
40 TABLET ORAL NIGHTLY
Qty: 90 TABLET | Refills: 0 | Status: SHIPPED | OUTPATIENT
Start: 2024-01-02

## 2024-01-02 RX ORDER — TRAZODONE HYDROCHLORIDE 150 MG/1
150 TABLET ORAL NIGHTLY
Qty: 90 TABLET | Refills: 1 | OUTPATIENT
Start: 2024-01-02

## 2024-01-02 RX ORDER — DIAZEPAM 5 MG/1
TABLET ORAL
Qty: 30 TABLET | Refills: 0 | Status: SHIPPED | OUTPATIENT
Start: 2024-01-02 | End: 2024-02-02

## 2024-01-02 RX ORDER — MONTELUKAST SODIUM 10 MG/1
10 TABLET ORAL DAILY
Qty: 90 TABLET | Refills: 0 | Status: SHIPPED | OUTPATIENT
Start: 2024-01-02

## 2024-01-02 RX ORDER — LOSARTAN POTASSIUM 50 MG/1
50 TABLET ORAL DAILY
Qty: 90 TABLET | Refills: 0 | Status: SHIPPED | OUTPATIENT
Start: 2024-01-02

## 2024-01-02 RX ORDER — DULOXETIN HYDROCHLORIDE 30 MG/1
CAPSULE, DELAYED RELEASE ORAL
Qty: 90 CAPSULE | Refills: 0 | Status: SHIPPED | OUTPATIENT
Start: 2024-01-02

## 2024-01-11 ENCOUNTER — TELEPHONE (OUTPATIENT)
Facility: CLINIC | Age: 69
End: 2024-01-11

## 2024-01-11 NOTE — TELEPHONE ENCOUNTER
Rec'd call from patient. He stated he is in the hospital in [place] and just wants to let Dr Barbour know. Patient advised when he is discharged from the hospital to call and schedule follow up appt with Dr Barbour. Verbalized understanding.

## 2024-01-16 DIAGNOSIS — M51.36 OTHER INTERVERTEBRAL DISC DEGENERATION, LUMBAR REGION: ICD-10-CM

## 2024-01-16 DIAGNOSIS — F33.41 MAJOR DEPRESSIVE DISORDER, RECURRENT, IN PARTIAL REMISSION (HCC): ICD-10-CM

## 2024-01-16 DIAGNOSIS — F41.9 ANXIETY DISORDER, UNSPECIFIED: ICD-10-CM

## 2024-01-16 RX ORDER — BUPROPION HYDROCHLORIDE 150 MG/1
150 TABLET ORAL DAILY
Qty: 90 TABLET | Refills: 1 | Status: SHIPPED | OUTPATIENT
Start: 2024-01-16 | End: 2024-02-12

## 2024-01-16 RX ORDER — DIAZEPAM 5 MG/1
TABLET ORAL
Qty: 30 TABLET | Refills: 0 | Status: SHIPPED | OUTPATIENT
Start: 2024-01-16 | End: 2024-02-12

## 2024-01-29 DIAGNOSIS — M51.36 OTHER INTERVERTEBRAL DISC DEGENERATION, LUMBAR REGION: ICD-10-CM

## 2024-01-29 DIAGNOSIS — F41.9 ANXIETY DISORDER, UNSPECIFIED: ICD-10-CM

## 2024-01-29 RX ORDER — DIAZEPAM 5 MG/1
TABLET ORAL
Qty: 30 TABLET | Refills: 0 | OUTPATIENT
Start: 2024-01-29

## 2024-01-30 ENCOUNTER — OFFICE VISIT (OUTPATIENT)
Facility: CLINIC | Age: 69
End: 2024-01-30
Payer: MEDICARE

## 2024-01-30 VITALS
WEIGHT: 142 LBS | SYSTOLIC BLOOD PRESSURE: 96 MMHG | TEMPERATURE: 97.6 F | HEART RATE: 59 BPM | OXYGEN SATURATION: 100 % | HEIGHT: 72 IN | RESPIRATION RATE: 17 BRPM | DIASTOLIC BLOOD PRESSURE: 57 MMHG | BODY MASS INDEX: 19.23 KG/M2

## 2024-01-30 DIAGNOSIS — I42.9 CARDIOMYOPATHY, UNSPECIFIED TYPE (HCC): Primary | ICD-10-CM

## 2024-01-30 DIAGNOSIS — F41.9 ANXIETY DISORDER, UNSPECIFIED TYPE: ICD-10-CM

## 2024-01-30 DIAGNOSIS — F51.01 PRIMARY INSOMNIA: ICD-10-CM

## 2024-01-30 DIAGNOSIS — F33.41 MAJOR DEPRESSIVE DISORDER, RECURRENT, IN PARTIAL REMISSION (HCC): ICD-10-CM

## 2024-01-30 DIAGNOSIS — N40.1 BENIGN PROSTATIC HYPERPLASIA WITH LOWER URINARY TRACT SYMPTOMS, SYMPTOM DETAILS UNSPECIFIED: ICD-10-CM

## 2024-01-30 DIAGNOSIS — Z09 HOSPITAL DISCHARGE FOLLOW-UP: ICD-10-CM

## 2024-01-30 PROCEDURE — 99215 OFFICE O/P EST HI 40 MIN: CPT | Performed by: FAMILY MEDICINE

## 2024-01-30 PROCEDURE — 1111F DSCHRG MED/CURRENT MED MERGE: CPT | Performed by: FAMILY MEDICINE

## 2024-01-30 PROCEDURE — 1123F ACP DISCUSS/DSCN MKR DOCD: CPT | Performed by: FAMILY MEDICINE

## 2024-01-30 RX ORDER — SACUBITRIL AND VALSARTAN 24; 26 MG/1; MG/1
1 TABLET, FILM COATED ORAL 2 TIMES DAILY
COMMUNITY

## 2024-01-30 RX ORDER — CARVEDILOL 3.12 MG/1
3.12 TABLET, FILM COATED ORAL
COMMUNITY
Start: 2024-01-12

## 2024-01-30 RX ORDER — FUROSEMIDE 20 MG/1
20 TABLET ORAL
COMMUNITY
Start: 2024-01-18

## 2024-01-30 RX ORDER — ATORVASTATIN CALCIUM 80 MG/1
80 TABLET, FILM COATED ORAL
COMMUNITY
Start: 2024-01-12

## 2024-01-30 RX ORDER — ASPIRIN 81 MG/1
81 TABLET, CHEWABLE ORAL DAILY
COMMUNITY

## 2024-01-30 RX ORDER — TRAZODONE HYDROCHLORIDE 150 MG/1
300 TABLET ORAL NIGHTLY
Qty: 90 TABLET | Refills: 1
Start: 2024-01-30

## 2024-01-31 NOTE — PROGRESS NOTES
Chief Complaint   Patient presents with    Follow-up     [facility] 1/26/24. [facility].   [facility] appt 1/26/24. [facility]. Records requested.    Stress test scheduled for 2/12/24 [facility].    \"Have you been to the ER, urgent care clinic since your last visit?  Hospitalized since your last visit?\"    Yes- [facility] 1/26/24.    “Have you seen or consulted any other health care providers outside of [facility] since your last visit?”    NO               Health Maintenance Due   Topic Date Due    COVID-19 Vaccine (1) Never done    Pneumococcal 65+ years Vaccine (1 - PCV) Never done    DTaP/Tdap/Td vaccine (1 - Tdap) Never done    Shingles vaccine (1 of 2) Never done    Low dose CT lung screening &/or counseling  Never done    Respiratory Syncytial Virus (RSV) Pregnant or age 60 yrs+ (1 - 1-dose 60+ series) Never done    AAA screen  Never done    Annual Wellness Visit (Medicare Advantage)  01/01/2024    Lipids  01/04/2024       
pain, dizziness or palpitations  GI: Negative for abdominal pain  MS: see HPI  Neuro: Negative for HA, weakness or paresthesia  Psych: see HPI    Vitals:    01/30/24 0851   BP: (!) 96/57   Site: Right Upper Arm   Position: Sitting   Cuff Size: Medium Adult   Pulse: 59   Resp: 17   Temp: 97.6 °F (36.4 °C)   TempSrc: Oral   SpO2: 100%   Weight: 64.4 kg (142 lb)   Height: 1.829 m (6')       Physical Examination:  General: thin, in no acute distress  Head: Normocephalic, atraumatic  Eyes: Sclera clear, EOMI  Neck: Normal range of motion  Respiratory: Symmetrical, unlabored effort  Cardiovascular: Regular rate and rhythm  Extremities: Full range of motion, antalgic gait  Neurologic: No focal deficits  Psych: Active, alert and oriented. Anxious affect     Diagnosis Orders   1. Cardiomyopathy, unspecified type (HCC)        2. Anxiety disorder, unspecified type        3. Major depressive disorder, recurrent, in partial remission (HCC)        4. Primary insomnia  traZODone (DESYREL) 150 MG tablet      5. Benign prostatic hyperplasia with lower urinary tract symptoms, symptom details unspecified        6. Hospital discharge follow-up  OH DISCHARGE MEDS RECONCILED W/ CURRENT OUTPATIENT MED LIST          Plan of care:  Diagnoses were discussed in detail with patient.   He will restart tamsulosin and increase dose of trazodone.  Other medications reviewed and appropriate.  Patient to continue current prescribed medications as written.    Medication risks/benefits/side effects discussed with patient.   All of the patient's questions were addressed and answered to apparent satisfaction.   The patient understands and agrees with our plan of care.  The patient knows to call back if they have questions about the plan of care or if symptoms change.  The patient received an After-Visit Summary which contains VS, diagnoses, orders, allergy and medication lists.      Future Appointments   Date Time Provider Department Center   2/13/2024

## 2024-02-09 ENCOUNTER — TELEPHONE (OUTPATIENT)
Facility: CLINIC | Age: 69
End: 2024-02-09

## 2024-02-09 NOTE — TELEPHONE ENCOUNTER
----- Message from Elisa Roche sent at 2/9/2024  2:05 PM EST -----  Subject: Message to Provider    QUESTIONS  Information for Provider? Pt called in related appointment .Pt was   scheduled for 2/12 @ 11 and want a sooner time because of his symptoms   .Please try get a sooner slot maybe 8 am for pt with urine related   symptoms ,.Pt is asking Zoie to call back to discuss everything as well     ---------------------------------------------------------------------------  --------------  CALL BACK INFO  [phone]; OK to leave message on voicemail  ---------------------------------------------------------------------------  --------------  SCRIPT ANSWERS  Relationship to Patient? Self

## 2024-02-12 ENCOUNTER — OFFICE VISIT (OUTPATIENT)
Facility: CLINIC | Age: 69
End: 2024-02-12
Payer: MEDICARE

## 2024-02-12 VITALS
WEIGHT: 143.6 LBS | DIASTOLIC BLOOD PRESSURE: 79 MMHG | OXYGEN SATURATION: 99 % | HEART RATE: 75 BPM | TEMPERATURE: 98.4 F | HEIGHT: 72 IN | BODY MASS INDEX: 19.45 KG/M2 | SYSTOLIC BLOOD PRESSURE: 119 MMHG | RESPIRATION RATE: 16 BRPM

## 2024-02-12 DIAGNOSIS — I42.9 CARDIOMYOPATHY, UNSPECIFIED TYPE (HCC): ICD-10-CM

## 2024-02-12 DIAGNOSIS — E78.5 HYPERLIPIDEMIA, UNSPECIFIED HYPERLIPIDEMIA TYPE: ICD-10-CM

## 2024-02-12 DIAGNOSIS — F33.41 MAJOR DEPRESSIVE DISORDER, RECURRENT, IN PARTIAL REMISSION (HCC): ICD-10-CM

## 2024-02-12 DIAGNOSIS — M51.36 OTHER INTERVERTEBRAL DISC DEGENERATION, LUMBAR REGION: ICD-10-CM

## 2024-02-12 DIAGNOSIS — N40.1 BENIGN PROSTATIC HYPERPLASIA WITH LOWER URINARY TRACT SYMPTOMS, SYMPTOM DETAILS UNSPECIFIED: ICD-10-CM

## 2024-02-12 DIAGNOSIS — R35.0 URINARY FREQUENCY: Primary | ICD-10-CM

## 2024-02-12 DIAGNOSIS — F51.01 PRIMARY INSOMNIA: ICD-10-CM

## 2024-02-12 LAB
BILIRUBIN, URINE, POC: NEGATIVE
BLOOD URINE, POC: NORMAL
GLUCOSE URINE, POC: NEGATIVE
KETONES, URINE, POC: NEGATIVE
LEUKOCYTE ESTERASE, URINE, POC: NEGATIVE
NITRITE, URINE, POC: NEGATIVE
PH, URINE, POC: 7 (ref 4.6–8)
PROTEIN,URINE, POC: NEGATIVE
SPECIFIC GRAVITY, URINE, POC: 1.02 (ref 1–1.03)
URINALYSIS CLARITY, POC: CLEAR
URINALYSIS COLOR, POC: YELLOW
UROBILINOGEN, POC: NORMAL

## 2024-02-12 PROCEDURE — 81002 URINALYSIS NONAUTO W/O SCOPE: CPT | Performed by: FAMILY MEDICINE

## 2024-02-12 PROCEDURE — 1123F ACP DISCUSS/DSCN MKR DOCD: CPT | Performed by: FAMILY MEDICINE

## 2024-02-12 PROCEDURE — 99214 OFFICE O/P EST MOD 30 MIN: CPT | Performed by: FAMILY MEDICINE

## 2024-02-12 RX ORDER — ATORVASTATIN CALCIUM 80 MG/1
80 TABLET, FILM COATED ORAL DAILY
Qty: 30 TABLET | Refills: 2 | Status: SHIPPED | OUTPATIENT
Start: 2024-02-12

## 2024-02-12 RX ORDER — ASPIRIN 81 MG/1
81 TABLET, CHEWABLE ORAL DAILY
Qty: 90 TABLET | Refills: 0 | Status: SHIPPED | OUTPATIENT
Start: 2024-02-12

## 2024-02-12 RX ORDER — DULOXETIN HYDROCHLORIDE 30 MG/1
30 CAPSULE, DELAYED RELEASE ORAL DAILY
Qty: 90 CAPSULE | Refills: 0 | Status: SHIPPED | OUTPATIENT
Start: 2024-02-12

## 2024-02-12 RX ORDER — SACUBITRIL AND VALSARTAN 24; 26 MG/1; MG/1
1 TABLET, FILM COATED ORAL 2 TIMES DAILY
Qty: 60 TABLET | Refills: 2 | Status: SHIPPED | OUTPATIENT
Start: 2024-02-12

## 2024-02-12 RX ORDER — FUROSEMIDE 20 MG/1
20 TABLET ORAL DAILY
Qty: 60 TABLET | Refills: 2 | Status: SHIPPED | OUTPATIENT
Start: 2024-02-12

## 2024-02-12 RX ORDER — CARVEDILOL 3.12 MG/1
3.12 TABLET, FILM COATED ORAL 2 TIMES DAILY
Qty: 60 TABLET | Refills: 2 | Status: SHIPPED | OUTPATIENT
Start: 2024-02-12

## 2024-02-12 RX ORDER — TRAZODONE HYDROCHLORIDE 150 MG/1
150 TABLET ORAL NIGHTLY
Qty: 90 TABLET | Refills: 0 | Status: SHIPPED | OUTPATIENT
Start: 2024-02-12

## 2024-02-12 NOTE — PROGRESS NOTES
Chief Complaint   Patient presents with    Urinary Frequency         \"Have you been to the ER, urgent care clinic since your last visit?  Hospitalized since your last visit?\"    NO    “Have you seen or consulted any other health care providers outside of [facility] since your last visit?”    NO           Health Maintenance Due   Topic Date Due    COVID-19 Vaccine (1) Never done    Pneumococcal 65+ years Vaccine (1 - PCV) Never done    DTaP/Tdap/Td vaccine (1 - Tdap) Never done    Shingles vaccine (1 of 2) Never done    Low dose CT lung screening &/or counseling  Never done    Respiratory Syncytial Virus (RSV) Pregnant or age 60 yrs+ (1 - 1-dose 60+ series) Never done    AAA screen  Never done    Annual Wellness Visit (Medicare Advantage)  01/01/2024    Lipids  01/04/2024

## 2024-02-12 NOTE — PROGRESS NOTES
Progress Note    Patient: Jayden Gonzales MRN: [id number]  SSN: [id number]    YOB: 1955  Age: 68 y.o.  Sex: male        Chief Complaint   Patient presents with    Urinary Frequency     he is a 68 y.o. year old male who presents for follow up of chronic health conditions. He was recently diagnosed with CHF. He needs medication refills. Patient with hx of COPD, insomnia, depression, HLD and chronic LBP. Patient lives alone. He is depressed though not suicidal or homicidal. Will restart medications. Patient denies F/C, HA, dizziness, SOB, CP, N/V, dysuria, acute weakness or paresthesia.     Encounter Diagnoses   Name Primary?    Urinary frequency Yes    Benign prostatic hyperplasia with lower urinary tract symptoms, symptom details unspecified     Cardiomyopathy, unspecified type (HCC)     Hyperlipidemia, unspecified hyperlipidemia type     Major depressive disorder, recurrent, in partial remission (HCC)     Primary insomnia     Other intervertebral disc degeneration, lumbar region      BP Readings from Last 3 Encounters:   02/12/24 119/79   01/30/24 (!) 96/57   11/10/23 (!) 157/97     Wt Readings from Last 3 Encounters:   02/12/24 65.1 kg (143 lb 9.6 oz)   01/30/24 64.4 kg (142 lb)   11/10/23 66.4 kg (146 lb 6.4 oz)     Body mass index is 19.48 kg/m².    Patient Active Problem List   Diagnosis    History of left hip replacement    Conductive hearing loss, bilateral    Tobacco abuse    Insomnia    Depression    Chronic obstructive pulmonary disease (HCC)    Hyperlipemia    Benzodiazepine contract exists    Recurrent major depressive disorder, in partial remission (HCC)     Past Surgical History:   Procedure Laterality Date    COLONOSCOPY N/A 9/18/2019    COLONOSCOPY performed by Gato Rubio MD at [facility] ENDOSCOPY    COLONOSCOPY N/A 8/23/2016    COLONOSCOPY performed by Rich Smiley MD at [facility] ENDOSCOPY    COLONOSCOPY      GI      surgery for hemorrhoids    HEENT      all teeth removed    ORTHOPEDIC

## 2024-02-19 ENCOUNTER — TELEPHONE (OUTPATIENT)
Facility: CLINIC | Age: 69
End: 2024-02-19

## 2024-02-19 NOTE — TELEPHONE ENCOUNTER
Returned call to patient, \"calling restrictions\" on phone. Hospital follow up Office Visit needs to be scheduled with Dr Barbour.

## 2024-02-19 NOTE — TELEPHONE ENCOUNTER
Care Transitions Initial Follow Up Call    Outreach made within 2 business days of discharge: Yes    Patient: Jayden Gonzales Patient : 1955   MRN: [id number]  Reason for Admission: SOB  Discharge Date: 24       Spoke with: calling restrictions no answer, no VM    Discharge department/facility: [facility]

## 2024-02-19 NOTE — TELEPHONE ENCOUNTER
Pt would sb to know if Timo Barbour can order him home Oxygen, and portable Oxygen. Pt states he is having breathing issues. Pt was in [facility] due to SOB. Pt states he had a stent put in, and can not make it in for another appt. Pt was put on new medications and would like to go over them with Dr Barbour. Pt given appt date and time to come see PCP for a follow up, however he declined. Please advise.

## 2024-02-22 ENCOUNTER — OFFICE VISIT (OUTPATIENT)
Facility: CLINIC | Age: 69
End: 2024-02-22

## 2024-02-22 VITALS
DIASTOLIC BLOOD PRESSURE: 85 MMHG | OXYGEN SATURATION: 100 % | SYSTOLIC BLOOD PRESSURE: 124 MMHG | RESPIRATION RATE: 16 BRPM | HEART RATE: 62 BPM | WEIGHT: 143 LBS | HEIGHT: 72 IN | BODY MASS INDEX: 19.37 KG/M2 | TEMPERATURE: 97.6 F

## 2024-02-22 DIAGNOSIS — Z95.5 HISTORY OF HEART ARTERY STENT: ICD-10-CM

## 2024-02-22 DIAGNOSIS — E78.5 HYPERLIPIDEMIA, UNSPECIFIED HYPERLIPIDEMIA TYPE: ICD-10-CM

## 2024-02-22 DIAGNOSIS — I10 ESSENTIAL (PRIMARY) HYPERTENSION: ICD-10-CM

## 2024-02-22 DIAGNOSIS — I25.2 HISTORY OF NON-ST ELEVATION MYOCARDIAL INFARCTION (NSTEMI): ICD-10-CM

## 2024-02-22 DIAGNOSIS — Z09 HOSPITAL DISCHARGE FOLLOW-UP: ICD-10-CM

## 2024-02-22 DIAGNOSIS — I42.9 CARDIOMYOPATHY, UNSPECIFIED TYPE (HCC): Primary | ICD-10-CM

## 2024-02-22 RX ORDER — SACUBITRIL AND VALSARTAN 24; 26 MG/1; MG/1
1 TABLET, FILM COATED ORAL 2 TIMES DAILY
COMMUNITY
Start: 2024-01-12 | End: 2024-02-22 | Stop reason: SDUPTHER

## 2024-02-22 RX ORDER — CARVEDILOL 6.25 MG/1
6.25 TABLET ORAL 2 TIMES DAILY
COMMUNITY
Start: 2024-02-19

## 2024-02-22 RX ORDER — CARVEDILOL 6.25 MG/1
6.25 TABLET ORAL 2 TIMES DAILY
Qty: 60 TABLET | Refills: 0 | COMMUNITY
Start: 2024-02-22

## 2024-02-22 RX ORDER — CLOPIDOGREL BISULFATE 75 MG/1
75 TABLET ORAL DAILY
COMMUNITY
Start: 2024-02-19

## 2024-02-22 RX ORDER — FAMOTIDINE 20 MG/1
20 TABLET, FILM COATED ORAL 2 TIMES DAILY
COMMUNITY
Start: 2024-02-19

## 2024-02-22 RX ORDER — SPIRONOLACTONE 25 MG/1
25 TABLET ORAL DAILY
COMMUNITY
Start: 2024-02-19

## 2024-02-28 NOTE — PROGRESS NOTES
Chief Complaint   Patient presents with    Follow-Up from Hospital         \"Have you been to the ER, urgent care clinic since your last visit?  Hospitalized since your last visit?\"    YES-[facility] ER transferred to [facility]    “Have you seen or consulted any other health care providers outside of  since your last visit?”    YES           Health Maintenance Due   Topic Date Due    COVID-19 Vaccine (1) Never done    Pneumococcal 65+ years Vaccine (1 - PCV) Never done    DTaP/Tdap/Td vaccine (1 - Tdap) Never done    Shingles vaccine (1 of 2) Never done    Low dose CT lung screening &/or counseling  Never done    Respiratory Syncytial Virus (RSV) Pregnant or age 60 yrs+ (1 - 1-dose 60+ series) Never done    AAA screen  Never done    Annual Wellness Visit (Medicare Advantage)  01/01/2024    Lipids  01/04/2024       
Father         throat/lung    Cancer Mother         breast    Thyroid Disease Father      Current Outpatient Medications   Medication Sig    clopidogrel (PLAVIX) 75 MG tablet Take 1 tablet by mouth daily    carvedilol (COREG) 6.25 MG tablet Take 1 tablet by mouth 2 times daily    famotidine (PEPCID) 20 MG tablet Take 1 tablet by mouth 2 times daily    spironolactone (ALDACTONE) 25 MG tablet Take 1 tablet by mouth daily    carvedilol (COREG) 6.25 MG tablet Take 1 tablet by mouth 2 times daily    psyllium 0.52 g capsule Take 1 capsule by mouth daily Per patient takes 2 caps daily    Melatonin 5 MG CAPS Take 1 capsule by mouth nightly    traZODone (DESYREL) 150 MG tablet Take 1 tablet by mouth nightly Per patient one tablet at night    sacubitril-valsartan (ENTRESTO) 24-26 MG per tablet Take 1 tablet by mouth 2 times daily    LASIX 20 MG tablet Take 1 tablet by mouth daily    DULoxetine (CYMBALTA) 30 MG extended release capsule Take 1 capsule by mouth daily    aspirin 81 MG chewable tablet Take 1 tablet by mouth daily    hydrocortisone 2.5 % cream Apply to affected area two (2) times a day. use thin layer    tamsulosin (FLOMAX) 0.4 MG capsule TAKE TWO CAPSULES BY MOUTH DAILY (Patient taking differently: Take 2 capsules by mouth daily Per patient taking 1 capsule daily)    albuterol sulfate HFA (PROVENTIL;VENTOLIN;PROAIR) 108 (90 Base) MCG/ACT inhaler Take 2 puffs by mouth every 6 hours as needed    atorvastatin (LIPITOR) 80 MG tablet Take 1 tablet by mouth daily (Patient not taking: Reported on 2/22/2024)     No current facility-administered medications for this visit.     Allergies   Allergen Reactions    Oxycodone-Acetaminophen Itching and Rash     itching       Review of Systems:  Constitutional: Negative for fatigue, malaise  Resp: Negative for cough, wheezing or SOB  CV: see HPI, Negative for chest pain, dizziness or palpitations  GI: Negative for abdominal pain  MS: see HPI  Neuro: Negative for HA, weakness or

## 2024-04-15 DIAGNOSIS — I42.9 CARDIOMYOPATHY, UNSPECIFIED TYPE (HCC): ICD-10-CM

## 2024-04-15 DIAGNOSIS — E78.5 HYPERLIPIDEMIA, UNSPECIFIED HYPERLIPIDEMIA TYPE: ICD-10-CM

## 2024-04-15 RX ORDER — ATORVASTATIN CALCIUM 80 MG/1
80 TABLET, FILM COATED ORAL DAILY
Qty: 30 TABLET | Refills: 2 | Status: SHIPPED | OUTPATIENT
Start: 2024-04-15

## 2024-04-15 RX ORDER — SACUBITRIL AND VALSARTAN 24; 26 MG/1; MG/1
1 TABLET, FILM COATED ORAL 2 TIMES DAILY
Qty: 60 TABLET | Refills: 2 | Status: SHIPPED | OUTPATIENT
Start: 2024-04-15

## 2024-05-14 ENCOUNTER — OFFICE VISIT (OUTPATIENT)
Facility: CLINIC | Age: 69
End: 2024-05-14
Payer: MEDICARE

## 2024-05-14 VITALS
TEMPERATURE: 98.9 F | HEART RATE: 66 BPM | OXYGEN SATURATION: 100 % | DIASTOLIC BLOOD PRESSURE: 75 MMHG | BODY MASS INDEX: 18.85 KG/M2 | HEIGHT: 72 IN | SYSTOLIC BLOOD PRESSURE: 114 MMHG | WEIGHT: 139.2 LBS | RESPIRATION RATE: 16 BRPM

## 2024-05-14 DIAGNOSIS — Z00.00 MEDICARE ANNUAL WELLNESS VISIT, SUBSEQUENT: ICD-10-CM

## 2024-05-14 DIAGNOSIS — J44.9 CHRONIC OBSTRUCTIVE PULMONARY DISEASE, UNSPECIFIED COPD TYPE (HCC): ICD-10-CM

## 2024-05-14 DIAGNOSIS — Z87.891 PERSONAL HISTORY OF TOBACCO USE: ICD-10-CM

## 2024-05-14 DIAGNOSIS — I10 ESSENTIAL (PRIMARY) HYPERTENSION: Primary | ICD-10-CM

## 2024-05-14 DIAGNOSIS — E78.5 HYPERLIPIDEMIA, UNSPECIFIED HYPERLIPIDEMIA TYPE: ICD-10-CM

## 2024-05-14 DIAGNOSIS — N40.1 BENIGN PROSTATIC HYPERPLASIA WITH LOWER URINARY TRACT SYMPTOMS, SYMPTOM DETAILS UNSPECIFIED: ICD-10-CM

## 2024-05-14 DIAGNOSIS — Z91.81 AT HIGH RISK FOR FALLS: ICD-10-CM

## 2024-05-14 DIAGNOSIS — F33.41 MAJOR DEPRESSIVE DISORDER, RECURRENT, IN PARTIAL REMISSION (HCC): ICD-10-CM

## 2024-05-14 DIAGNOSIS — I42.9 CARDIOMYOPATHY, UNSPECIFIED TYPE (HCC): ICD-10-CM

## 2024-05-14 DIAGNOSIS — K21.9 GASTRO-ESOPHAGEAL REFLUX DISEASE WITHOUT ESOPHAGITIS: ICD-10-CM

## 2024-05-14 DIAGNOSIS — F51.01 PRIMARY INSOMNIA: ICD-10-CM

## 2024-05-14 PROCEDURE — G0296 VISIT TO DETERM LDCT ELIG: HCPCS | Performed by: FAMILY MEDICINE

## 2024-05-14 PROCEDURE — 1123F ACP DISCUSS/DSCN MKR DOCD: CPT | Performed by: FAMILY MEDICINE

## 2024-05-14 PROCEDURE — 99214 OFFICE O/P EST MOD 30 MIN: CPT | Performed by: FAMILY MEDICINE

## 2024-05-14 PROCEDURE — G0439 PPPS, SUBSEQ VISIT: HCPCS | Performed by: FAMILY MEDICINE

## 2024-05-14 PROCEDURE — 3074F SYST BP LT 130 MM HG: CPT | Performed by: FAMILY MEDICINE

## 2024-05-14 PROCEDURE — 3078F DIAST BP <80 MM HG: CPT | Performed by: FAMILY MEDICINE

## 2024-05-14 RX ORDER — TIOTROPIUM BROMIDE 18 UG/1
18 CAPSULE ORAL; RESPIRATORY (INHALATION) DAILY
COMMUNITY

## 2024-05-14 RX ORDER — FUROSEMIDE 20 MG/1
20 TABLET ORAL DAILY
Qty: 60 TABLET | Refills: 3 | Status: SHIPPED | OUTPATIENT
Start: 2024-05-14

## 2024-05-14 RX ORDER — TRAZODONE HYDROCHLORIDE 150 MG/1
150 TABLET ORAL NIGHTLY
Qty: 30 TABLET | Refills: 3 | Status: SHIPPED | OUTPATIENT
Start: 2024-05-14

## 2024-05-14 RX ORDER — CLOPIDOGREL BISULFATE 75 MG/1
75 TABLET ORAL DAILY
Qty: 30 TABLET | Refills: 3 | Status: SHIPPED | OUTPATIENT
Start: 2024-05-14

## 2024-05-14 RX ORDER — FAMOTIDINE 20 MG/1
20 TABLET, FILM COATED ORAL 2 TIMES DAILY
Qty: 60 TABLET | Refills: 3 | Status: SHIPPED | OUTPATIENT
Start: 2024-05-14

## 2024-05-14 RX ORDER — CARVEDILOL 6.25 MG/1
6.25 TABLET ORAL 2 TIMES DAILY
Qty: 60 TABLET | Refills: 3 | Status: SHIPPED | OUTPATIENT
Start: 2024-05-14

## 2024-05-14 RX ORDER — TAMSULOSIN HYDROCHLORIDE 0.4 MG/1
0.4 CAPSULE ORAL DAILY
Qty: 30 CAPSULE | Refills: 3 | Status: SHIPPED | OUTPATIENT
Start: 2024-05-14

## 2024-05-14 RX ORDER — SPIRONOLACTONE 25 MG/1
25 TABLET ORAL DAILY
Qty: 30 TABLET | Refills: 3 | Status: SHIPPED | OUTPATIENT
Start: 2024-05-14

## 2024-05-14 ASSESSMENT — PATIENT HEALTH QUESTIONNAIRE - PHQ9
SUM OF ALL RESPONSES TO PHQ QUESTIONS 1-9: 17
6. FEELING BAD ABOUT YOURSELF - OR THAT YOU ARE A FAILURE OR HAVE LET YOURSELF OR YOUR FAMILY DOWN: NEARLY EVERY DAY
SUM OF ALL RESPONSES TO PHQ QUESTIONS 1-9: 17
SUM OF ALL RESPONSES TO PHQ9 QUESTIONS 1 & 2: 6
10. IF YOU CHECKED OFF ANY PROBLEMS, HOW DIFFICULT HAVE THESE PROBLEMS MADE IT FOR YOU TO DO YOUR WORK, TAKE CARE OF THINGS AT HOME, OR GET ALONG WITH OTHER PEOPLE: VERY DIFFICULT
4. FEELING TIRED OR HAVING LITTLE ENERGY: MORE THAN HALF THE DAYS
8. MOVING OR SPEAKING SO SLOWLY THAT OTHER PEOPLE COULD HAVE NOTICED. OR THE OPPOSITE, BEING SO FIGETY OR RESTLESS THAT YOU HAVE BEEN MOVING AROUND A LOT MORE THAN USUAL: NOT AT ALL
2. FEELING DOWN, DEPRESSED OR HOPELESS: NEARLY EVERY DAY
9. THOUGHTS THAT YOU WOULD BE BETTER OFF DEAD, OR OF HURTING YOURSELF: NOT AT ALL
SUM OF ALL RESPONSES TO PHQ QUESTIONS 1-9: 17
SUM OF ALL RESPONSES TO PHQ QUESTIONS 1-9: 17
1. LITTLE INTEREST OR PLEASURE IN DOING THINGS: NEARLY EVERY DAY
5. POOR APPETITE OR OVEREATING: NOT AT ALL
7. TROUBLE CONCENTRATING ON THINGS, SUCH AS READING THE NEWSPAPER OR WATCHING TELEVISION: NEARLY EVERY DAY
3. TROUBLE FALLING OR STAYING ASLEEP: NEARLY EVERY DAY

## 2024-05-14 ASSESSMENT — LIFESTYLE VARIABLES
HOW MANY STANDARD DRINKS CONTAINING ALCOHOL DO YOU HAVE ON A TYPICAL DAY: PATIENT DOES NOT DRINK
HOW OFTEN DO YOU HAVE A DRINK CONTAINING ALCOHOL: NEVER

## 2024-05-14 NOTE — PROGRESS NOTES
Chief Complaint   Patient presents with    Follow-up Chronic Condition    Medicare AWV         \"Have you been to the ER, urgent care clinic since your last visit?  Hospitalized since your last visit?\"    YES, [facility]     “Have you seen or consulted any other health care providers outside of [facility] since your last visit?”    YES, pulmonology           Health Maintenance Due   Topic Date Due    COVID-19 Vaccine (1) Never done    Pneumococcal 65+ years Vaccine (1 of 2 - PCV) Never done    DTaP/Tdap/Td vaccine (1 - Tdap) Never done    Shingles vaccine (1 of 2) Never done    Low dose CT lung screening &/or counseling  Never done    Respiratory Syncytial Virus (RSV) Pregnant or age 60 yrs+ (1 - 1-dose 60+ series) Never done    AAA screen  Never done    Annual Wellness Visit (Medicare Advantage)  01/01/2024    Lipids  01/04/2024       
15 years. There are no signs or symptoms of lung cancer.            Objective   Vitals:    05/14/24 0811   BP: 114/75   Site: Left Upper Arm   Position: Sitting   Cuff Size: Large Adult   Pulse: 66   Resp: 16   Temp: 98.9 °F (37.2 °C)   TempSrc: Infrared   SpO2: 100%   Weight: 63.1 kg (139 lb 3.2 oz)   Height: 1.829 m (6')      Body mass index is 18.88 kg/m².            Allergies   Allergen Reactions    Oxycodone-Acetaminophen Itching and Rash     itching     Prior to Visit Medications    Medication Sig Taking? Authorizing Provider   tiotropium (SPIRIVA) 18 MCG inhalation capsule Inhale 1 capsule into the lungs daily Yes Giuseppe Welsh MD   carvedilol (COREG) 6.25 MG tablet Take 1 tablet by mouth 2 times daily Yes Santy Barbour MD   famotidine (PEPCID) 20 MG tablet Take 1 tablet by mouth 2 times daily Yes Santy Barbour MD   clopidogrel (PLAVIX) 75 MG tablet Take 1 tablet by mouth daily Yes Santy Barbour MD   spironolactone (ALDACTONE) 25 MG tablet Take 1 tablet by mouth daily Yes Santy Barbour MD   LASIX 20 MG tablet Take 1 tablet by mouth daily Yes Santy Barbour MD   traZODone (DESYREL) 150 MG tablet Take 1 tablet by mouth nightly Per patient one tablet at night Yes Santy Barbour MD   tamsulosin (FLOMAX) 0.4 MG capsule Take 1 capsule by mouth daily Yes Santy Barbour MD   ENTRESTO 24-26 MG per tablet Take 1 tablet by mouth 2 times daily Yes Santy Barbour MD   atorvastatin (LIPITOR) 80 MG tablet Take 1 tablet by mouth daily Yes Santy Barbour MD   psyllium 0.52 g capsule Take 1 capsule by mouth daily Per patient takes 2 caps daily Yes Giuseppe Welsh MD   Melatonin 5 MG CAPS Take 1 capsule by mouth nightly Yes Giuseppe Welsh MD   DULoxetine (CYMBALTA) 30 MG extended release capsule Take 1 capsule by mouth daily Yes Santy Barbour MD   aspirin 81 MG chewable tablet Take 1 tablet by mouth daily Yes Santy Barbour MD   hydrocortisone 2.5 % cream Apply to affected area two (2) times

## 2024-05-14 NOTE — PATIENT INSTRUCTIONS
the right medical treatments, including counseling.  Spiritual or Mosque groups: They can provide comfort and may be able to help you find counseling or other social support services.  Social groups: They can help you meet new people and get involved in activities you enjoy.  Community support groups: In a support group, you can talk to others who have dealt with the same problems or illness as you. You can encourage one another and learn ways to cope with tough emotions.  How can you find a support group?  Finding a support group that works for you may take time. There are many options. Some groups have a  who helps lead discussions or shares information. Others are less formal. Some meet in person, while others meet online.  Try using these resources to help you find the best support group for you.  Your doctor, health care team, or counselor.  People with the same health concern.  Your local Oriental orthodox, Pentecostalism, Religious, or other Mosque group.  A city, state, or national group that provides support for your health concern. Check your local library or community center for a list of these groups. Or look for information online.  Your local community, friends, and family.  Supportive relationships  A supportive relationship includes emotional support such as love, trust, and understanding, as well as advice and concrete help, such as help managing your time.  Reach out to others  Family and friends can help you. Ask them to:  Listen to you and give you encouragement. This can keep you from feeling hopeless or alone.  Help with small daily tasks or with bigger problems. A helping hand can keep you from feeling overwhelmed.  Help you manage a health problem. For example, ask them to go to doctor visits with you. Your loved ones can offer support by being involved in your medical care.  Respect your relationships  A good relationship is also a two-way street. You count on help from others, but they also

## 2024-05-15 LAB
ALBUMIN SERPL-MCNC: 3.4 G/DL (ref 3.5–5)
ALBUMIN/GLOB SERPL: 1 (ref 1.1–2.2)
ALP SERPL-CCNC: 115 U/L (ref 45–117)
ALT SERPL-CCNC: 20 U/L (ref 12–78)
ANION GAP SERPL CALC-SCNC: 4 MMOL/L (ref 5–15)
AST SERPL-CCNC: 15 U/L (ref 15–37)
BILIRUB SERPL-MCNC: 0.4 MG/DL (ref 0.2–1)
BUN SERPL-MCNC: 28 MG/DL (ref 6–20)
BUN/CREAT SERPL: 18 (ref 12–20)
CALCIUM SERPL-MCNC: 9.5 MG/DL (ref 8.5–10.1)
CHLORIDE SERPL-SCNC: 113 MMOL/L (ref 97–108)
CHOLEST SERPL-MCNC: 110 MG/DL
CO2 SERPL-SCNC: 23 MMOL/L (ref 21–32)
CREAT SERPL-MCNC: 1.56 MG/DL (ref 0.7–1.3)
ERYTHROCYTE [DISTWIDTH] IN BLOOD BY AUTOMATED COUNT: 16.2 % (ref 11.5–14.5)
GLOBULIN SER CALC-MCNC: 3.4 G/DL (ref 2–4)
GLUCOSE SERPL-MCNC: 88 MG/DL (ref 65–100)
HCT VFR BLD AUTO: 32.2 % (ref 36.6–50.3)
HDLC SERPL-MCNC: 33 MG/DL
HDLC SERPL: 3.3 (ref 0–5)
HGB BLD-MCNC: 10.3 G/DL (ref 12.1–17)
LDLC SERPL CALC-MCNC: 53.8 MG/DL (ref 0–100)
MCH RBC QN AUTO: 29.4 PG (ref 26–34)
MCHC RBC AUTO-ENTMCNC: 32 G/DL (ref 30–36.5)
MCV RBC AUTO: 92 FL (ref 80–99)
NRBC # BLD: 0 K/UL (ref 0–0.01)
NRBC BLD-RTO: 0 PER 100 WBC
NT PRO BNP: 1417 PG/ML
PLATELET # BLD AUTO: 306 K/UL (ref 150–400)
PMV BLD AUTO: 10.5 FL (ref 8.9–12.9)
POTASSIUM SERPL-SCNC: 5.5 MMOL/L (ref 3.5–5.1)
PROT SERPL-MCNC: 6.8 G/DL (ref 6.4–8.2)
PSA SERPL-MCNC: 1.9 NG/ML (ref 0.01–4)
RBC # BLD AUTO: 3.5 M/UL (ref 4.1–5.7)
SODIUM SERPL-SCNC: 140 MMOL/L (ref 136–145)
TRIGL SERPL-MCNC: 116 MG/DL
TSH SERPL DL<=0.05 MIU/L-ACNC: 1.63 UIU/ML (ref 0.36–3.74)
VLDLC SERPL CALC-MCNC: 23.2 MG/DL
WBC # BLD AUTO: 10.6 K/UL (ref 4.1–11.1)

## 2024-06-10 DIAGNOSIS — M51.36 OTHER INTERVERTEBRAL DISC DEGENERATION, LUMBAR REGION: ICD-10-CM

## 2024-06-10 RX ORDER — DICLOFENAC SODIUM 75 MG/1
75 TABLET, DELAYED RELEASE ORAL 2 TIMES DAILY
Qty: 180 TABLET | Refills: 0 | OUTPATIENT
Start: 2024-06-10

## 2024-06-17 DIAGNOSIS — M51.36 OTHER INTERVERTEBRAL DISC DEGENERATION, LUMBAR REGION: ICD-10-CM

## 2024-06-17 RX ORDER — DICLOFENAC SODIUM 75 MG/1
75 TABLET, DELAYED RELEASE ORAL 2 TIMES DAILY
Qty: 180 TABLET | Refills: 0 | OUTPATIENT
Start: 2024-06-17

## 2024-06-18 ENCOUNTER — TELEPHONE (OUTPATIENT)
Facility: CLINIC | Age: 69
End: 2024-06-18

## 2024-06-18 NOTE — TELEPHONE ENCOUNTER
Returning call to patient, not on line when picked up call. This nurse would like to speak with patient if/when he returns call.

## 2024-06-18 NOTE — TELEPHONE ENCOUNTER
Pt would like to talk to the nurse, and only the nurse. Pt wants to know why his diclofenac keeps being declined. Please advise.

## 2024-06-19 NOTE — TELEPHONE ENCOUNTER
Called patient. He was advised he can not take Diclofenac due to his kidney function and being on a blood thinner. Verbalized understanding.

## 2024-07-15 DIAGNOSIS — I42.9 CARDIOMYOPATHY, UNSPECIFIED TYPE (HCC): ICD-10-CM

## 2024-07-15 DIAGNOSIS — E78.5 HYPERLIPIDEMIA, UNSPECIFIED HYPERLIPIDEMIA TYPE: ICD-10-CM

## 2024-07-17 RX ORDER — SACUBITRIL AND VALSARTAN 24; 26 MG/1; MG/1
1 TABLET, FILM COATED ORAL 2 TIMES DAILY
Qty: 60 TABLET | Refills: 2 | Status: SHIPPED | OUTPATIENT
Start: 2024-07-17

## 2024-07-17 RX ORDER — ATORVASTATIN CALCIUM 80 MG/1
80 TABLET, FILM COATED ORAL DAILY
Qty: 30 TABLET | Refills: 2 | Status: SHIPPED | OUTPATIENT
Start: 2024-07-17

## 2024-08-14 ENCOUNTER — OFFICE VISIT (OUTPATIENT)
Facility: CLINIC | Age: 69
End: 2024-08-14
Payer: MEDICARE

## 2024-08-14 VITALS
TEMPERATURE: 97.5 F | WEIGHT: 138.8 LBS | BODY MASS INDEX: 18.8 KG/M2 | HEIGHT: 72 IN | HEART RATE: 68 BPM | OXYGEN SATURATION: 98 % | DIASTOLIC BLOOD PRESSURE: 70 MMHG | RESPIRATION RATE: 16 BRPM | SYSTOLIC BLOOD PRESSURE: 107 MMHG

## 2024-08-14 DIAGNOSIS — R63.4 ABNORMAL WEIGHT LOSS: ICD-10-CM

## 2024-08-14 DIAGNOSIS — I50.22 CHRONIC SYSTOLIC CONGESTIVE HEART FAILURE (HCC): ICD-10-CM

## 2024-08-14 DIAGNOSIS — I10 ESSENTIAL (PRIMARY) HYPERTENSION: Primary | ICD-10-CM

## 2024-08-14 DIAGNOSIS — R91.1 LUNG NODULE: ICD-10-CM

## 2024-08-14 DIAGNOSIS — E78.5 HYPERLIPIDEMIA, UNSPECIFIED HYPERLIPIDEMIA TYPE: ICD-10-CM

## 2024-08-14 DIAGNOSIS — J43.8 OTHER EMPHYSEMA (HCC): ICD-10-CM

## 2024-08-14 PROCEDURE — 1123F ACP DISCUSS/DSCN MKR DOCD: CPT | Performed by: FAMILY MEDICINE

## 2024-08-14 PROCEDURE — 3074F SYST BP LT 130 MM HG: CPT | Performed by: FAMILY MEDICINE

## 2024-08-14 PROCEDURE — 3078F DIAST BP <80 MM HG: CPT | Performed by: FAMILY MEDICINE

## 2024-08-14 PROCEDURE — 99214 OFFICE O/P EST MOD 30 MIN: CPT | Performed by: FAMILY MEDICINE

## 2024-08-14 SDOH — ECONOMIC STABILITY: INCOME INSECURITY: HOW HARD IS IT FOR YOU TO PAY FOR THE VERY BASICS LIKE FOOD, HOUSING, MEDICAL CARE, AND HEATING?: SOMEWHAT HARD

## 2024-08-14 SDOH — ECONOMIC STABILITY: FOOD INSECURITY: WITHIN THE PAST 12 MONTHS, YOU WORRIED THAT YOUR FOOD WOULD RUN OUT BEFORE YOU GOT MONEY TO BUY MORE.: SOMETIMES TRUE

## 2024-08-14 SDOH — ECONOMIC STABILITY: FOOD INSECURITY: WITHIN THE PAST 12 MONTHS, THE FOOD YOU BOUGHT JUST DIDN'T LAST AND YOU DIDN'T HAVE MONEY TO GET MORE.: SOMETIMES TRUE

## 2024-08-14 NOTE — PATIENT INSTRUCTIONS
Food pantry options in area:    The number is:  211 or you can also look at this website:  https://www.[employer].org/  The quality of the information on this site will be based on information having been inputted by the local agencies.    Here is the link for [employer]:  https://www.[employer]/   Food distributions are carried out every Saturday from 8 AM to 10:30 AM from their warehouse at [address] [place] [address] and at [facility], [address] [place] [address].  The eligibility information is on their website.  There are also directions on how to contact them if you have any questions.    Here is a link for the [facility] [employer]:  [email or web address]/   This has information about nonprofits in our region that provide services to local citizens with various needs.    HELP - [address] [place] [address], every Friday 11-3 pm (east of [facility])    POLLY Urias Sat 7:30 -10, [address], 3rd Saturday of the month,  8-10:30 am    [address], Sat 9-11 (for those who live west of [facility])

## 2024-08-14 NOTE — PROGRESS NOTES
Progress Note    Patient: Jayden Gonzales MRN: [id number]  SSN: [id number]    YOB: 1955  Age: 68 y.o.  Sex: male        Chief Complaint   Patient presents with    Follow-up Chronic Condition    Breast Problem     B/L nipple pain     he is a 68 y.o. year old male who presents for follow up of chronic health conditions. Patient with hx of  NSTEMI, CHF, COPD, insomnia, depression, HLD and chronic LBP. Patient lives alone. He has seen cardiology since last OV. He has been evaluated by pulmonology. Prior screening lung CT identified a lung nodule. Patient denies F/C, HA, dizziness, SOB, CP, N/V, dysuria, acute weakness or paresthesia.     Encounter Diagnoses   Name Primary?    Essential (primary) hypertension Yes    Hyperlipidemia, unspecified hyperlipidemia type     Other emphysema (HCC)     Chronic systolic congestive heart failure (HCC)     Lung nodule     Abnormal weight loss        BP Readings from Last 3 Encounters:   08/14/24 107/70   05/14/24 114/75   02/22/24 124/85     Wt Readings from Last 3 Encounters:   08/14/24 63 kg (138 lb 12.8 oz)   05/14/24 63.1 kg (139 lb 3.2 oz)   02/22/24 64.9 kg (143 lb)     Body mass index is 18.82 kg/m².    Patient Active Problem List   Diagnosis    History of left hip replacement    Conductive hearing loss, bilateral    Tobacco abuse    Insomnia    Depression    Chronic obstructive pulmonary disease (HCC)    Hyperlipemia    Benzodiazepine contract exists    Recurrent major depressive disorder, in partial remission (HCC)     Past Surgical History:   Procedure Laterality Date    COLONOSCOPY N/A 9/18/2019    COLONOSCOPY performed by Gato Rubio MD at [facility] ENDOSCOPY    COLONOSCOPY N/A 8/23/2016    COLONOSCOPY performed by Rich Smiley MD at [facility] ENDOSCOPY    COLONOSCOPY      GI      surgery for hemorrhoids    HEENT      all teeth removed    ORTHOPEDIC SURGERY      infection after sutured finger/then healed/then had a growth removed 2 1/2 yrs later - benign     Detail Level: Detailed Quality 110: Preventive Care And Screening: Influenza Immunization: Influenza Immunization not Administered because Patient Refused. Quality 431: Preventive Care And Screening: Unhealthy Alcohol Use - Screening: Patient not identified as an unhealthy alcohol user when screened for unhealthy alcohol use using a systematic screening method Quality 402: Tobacco Use And Help With Quitting Among Adolescents: Patient screened for tobacco and never smoked Quality 130: Documentation Of Current Medications In The Medical Record: Current Medications Documented Quality 226: Preventive Care And Screening: Tobacco Use: Screening And Cessation Intervention: Patient screened for tobacco use and is an ex/non-smoker

## 2024-08-14 NOTE — PROGRESS NOTES
Chief Complaint   Patient presents with    Follow-up Chronic Condition    Breast Problem     B/L nipple pain      \"Have you been to the ER, urgent care clinic since your last visit?  Hospitalized since your last visit?\"    NO    “Have you seen or consulted any other health care providers outside of [facility] since your last visit?”    YES,  cardio            Click Here for Release of Records Request     Health Maintenance Due   Topic Date Due    Flu vaccine (1) Never done

## 2024-08-15 LAB
ALBUMIN SERPL-MCNC: 3.9 G/DL (ref 3.5–5)
ALBUMIN/GLOB SERPL: 1.3 (ref 1.1–2.2)
ALP SERPL-CCNC: 112 U/L (ref 45–117)
ALT SERPL-CCNC: 18 U/L (ref 12–78)
ANION GAP SERPL CALC-SCNC: 6 MMOL/L (ref 5–15)
AST SERPL-CCNC: 9 U/L (ref 15–37)
BILIRUB SERPL-MCNC: 0.6 MG/DL (ref 0.2–1)
BUN SERPL-MCNC: 19 MG/DL (ref 6–20)
BUN/CREAT SERPL: 14 (ref 12–20)
CALCIUM SERPL-MCNC: 9.8 MG/DL (ref 8.5–10.1)
CHLORIDE SERPL-SCNC: 109 MMOL/L (ref 97–108)
CO2 SERPL-SCNC: 25 MMOL/L (ref 21–32)
CREAT SERPL-MCNC: 1.32 MG/DL (ref 0.7–1.3)
ERYTHROCYTE [DISTWIDTH] IN BLOOD BY AUTOMATED COUNT: 12.7 % (ref 11.5–14.5)
GLOBULIN SER CALC-MCNC: 3.1 G/DL (ref 2–4)
GLUCOSE SERPL-MCNC: 85 MG/DL (ref 65–100)
HCT VFR BLD AUTO: 42.2 % (ref 36.6–50.3)
HGB BLD-MCNC: 13.5 G/DL (ref 12.1–17)
MCH RBC QN AUTO: 30.1 PG (ref 26–34)
MCHC RBC AUTO-ENTMCNC: 32 G/DL (ref 30–36.5)
MCV RBC AUTO: 94.2 FL (ref 80–99)
NRBC # BLD: 0 K/UL (ref 0–0.01)
NRBC BLD-RTO: 0 PER 100 WBC
PLATELET # BLD AUTO: 276 K/UL (ref 150–400)
PMV BLD AUTO: 10.3 FL (ref 8.9–12.9)
POTASSIUM SERPL-SCNC: 3.9 MMOL/L (ref 3.5–5.1)
PROT SERPL-MCNC: 7 G/DL (ref 6.4–8.2)
RBC # BLD AUTO: 4.48 M/UL (ref 4.1–5.7)
SODIUM SERPL-SCNC: 140 MMOL/L (ref 136–145)
TSH SERPL DL<=0.05 MIU/L-ACNC: 1.04 UIU/ML (ref 0.36–3.74)
WBC # BLD AUTO: 10.7 K/UL (ref 4.1–11.1)

## 2024-08-16 LAB — TESTOST SERPL-MCNC: 1110 NG/DL (ref 264–916)

## 2024-09-07 DIAGNOSIS — K64.0 FIRST DEGREE HEMORRHOIDS: ICD-10-CM

## 2024-09-09 DIAGNOSIS — F51.01 PRIMARY INSOMNIA: ICD-10-CM

## 2024-09-09 DIAGNOSIS — F33.41 MAJOR DEPRESSIVE DISORDER, RECURRENT, IN PARTIAL REMISSION (HCC): ICD-10-CM

## 2024-09-10 RX ORDER — HYDROCORTISONE 2.5 %
CREAM (GRAM) TOPICAL
Qty: 30 G | Refills: 1 | Status: SHIPPED | OUTPATIENT
Start: 2024-09-10

## 2024-09-12 RX ORDER — BUPROPION HYDROCHLORIDE 150 MG/1
150 TABLET ORAL DAILY
Qty: 90 TABLET | Refills: 1 | Status: SHIPPED | OUTPATIENT
Start: 2024-09-12

## 2024-09-12 RX ORDER — TRAZODONE HYDROCHLORIDE 150 MG/1
150 TABLET ORAL NIGHTLY
Qty: 30 TABLET | Refills: 3 | Status: SHIPPED | OUTPATIENT
Start: 2024-09-12

## 2024-10-05 DIAGNOSIS — N40.1 BENIGN PROSTATIC HYPERPLASIA WITH LOWER URINARY TRACT SYMPTOMS, SYMPTOM DETAILS UNSPECIFIED: ICD-10-CM

## 2024-10-07 RX ORDER — TAMSULOSIN HYDROCHLORIDE 0.4 MG/1
0.4 CAPSULE ORAL DAILY
Qty: 30 CAPSULE | Refills: 3 | Status: SHIPPED | OUTPATIENT
Start: 2024-10-07

## 2024-10-14 DIAGNOSIS — E78.5 HYPERLIPIDEMIA, UNSPECIFIED HYPERLIPIDEMIA TYPE: ICD-10-CM

## 2024-10-14 DIAGNOSIS — I42.9 CARDIOMYOPATHY, UNSPECIFIED TYPE (HCC): ICD-10-CM

## 2024-10-14 RX ORDER — SACUBITRIL AND VALSARTAN 24; 26 MG/1; MG/1
1 TABLET, FILM COATED ORAL 2 TIMES DAILY
Qty: 60 TABLET | Refills: 2 | Status: SHIPPED | OUTPATIENT
Start: 2024-10-14

## 2024-10-14 RX ORDER — ATORVASTATIN CALCIUM 80 MG/1
80 TABLET, FILM COATED ORAL DAILY
Qty: 30 TABLET | Refills: 2 | Status: SHIPPED | OUTPATIENT
Start: 2024-10-14

## 2024-11-14 ENCOUNTER — OFFICE VISIT (OUTPATIENT)
Facility: CLINIC | Age: 69
End: 2024-11-14
Payer: MEDICARE

## 2024-11-14 VITALS
WEIGHT: 144.4 LBS | TEMPERATURE: 98.5 F | SYSTOLIC BLOOD PRESSURE: 130 MMHG | RESPIRATION RATE: 16 BRPM | DIASTOLIC BLOOD PRESSURE: 82 MMHG | HEART RATE: 60 BPM | BODY MASS INDEX: 19.56 KG/M2 | OXYGEN SATURATION: 99 % | HEIGHT: 72 IN

## 2024-11-14 DIAGNOSIS — K21.9 GASTRO-ESOPHAGEAL REFLUX DISEASE WITHOUT ESOPHAGITIS: ICD-10-CM

## 2024-11-14 DIAGNOSIS — I10 ESSENTIAL (PRIMARY) HYPERTENSION: ICD-10-CM

## 2024-11-14 DIAGNOSIS — N40.1 BENIGN PROSTATIC HYPERPLASIA WITH LOWER URINARY TRACT SYMPTOMS, SYMPTOM DETAILS UNSPECIFIED: ICD-10-CM

## 2024-11-14 DIAGNOSIS — F51.01 PRIMARY INSOMNIA: ICD-10-CM

## 2024-11-14 DIAGNOSIS — I42.9 CARDIOMYOPATHY, UNSPECIFIED TYPE (HCC): ICD-10-CM

## 2024-11-14 DIAGNOSIS — E78.5 HYPERLIPIDEMIA, UNSPECIFIED HYPERLIPIDEMIA TYPE: ICD-10-CM

## 2024-11-14 PROCEDURE — 1159F MED LIST DOCD IN RCRD: CPT | Performed by: FAMILY MEDICINE

## 2024-11-14 PROCEDURE — 1123F ACP DISCUSS/DSCN MKR DOCD: CPT | Performed by: FAMILY MEDICINE

## 2024-11-14 PROCEDURE — 3079F DIAST BP 80-89 MM HG: CPT | Performed by: FAMILY MEDICINE

## 2024-11-14 PROCEDURE — 99214 OFFICE O/P EST MOD 30 MIN: CPT | Performed by: FAMILY MEDICINE

## 2024-11-14 PROCEDURE — 3075F SYST BP GE 130 - 139MM HG: CPT | Performed by: FAMILY MEDICINE

## 2024-11-14 PROCEDURE — 1125F AMNT PAIN NOTED PAIN PRSNT: CPT | Performed by: FAMILY MEDICINE

## 2024-11-14 PROCEDURE — 1160F RVW MEDS BY RX/DR IN RCRD: CPT | Performed by: FAMILY MEDICINE

## 2024-11-14 RX ORDER — ISOSORBIDE MONONITRATE 30 MG/1
30 TABLET, EXTENDED RELEASE ORAL DAILY
COMMUNITY

## 2024-11-14 RX ORDER — TAMSULOSIN HYDROCHLORIDE 0.4 MG/1
0.4 CAPSULE ORAL DAILY
Qty: 90 CAPSULE | Refills: 1 | Status: SHIPPED | OUTPATIENT
Start: 2024-11-14

## 2024-11-14 RX ORDER — ATORVASTATIN CALCIUM 80 MG/1
80 TABLET, FILM COATED ORAL DAILY
Qty: 90 TABLET | Refills: 1 | Status: SHIPPED | OUTPATIENT
Start: 2024-11-14

## 2024-11-14 RX ORDER — FAMOTIDINE 20 MG/1
20 TABLET, FILM COATED ORAL 2 TIMES DAILY
Qty: 180 TABLET | Refills: 1 | Status: SHIPPED | OUTPATIENT
Start: 2024-11-14

## 2024-11-14 RX ORDER — CARVEDILOL 6.25 MG/1
6.25 TABLET ORAL 2 TIMES DAILY
Qty: 180 TABLET | Refills: 1 | Status: SHIPPED | OUTPATIENT
Start: 2024-11-14

## 2024-11-14 RX ORDER — CLOPIDOGREL BISULFATE 75 MG/1
75 TABLET ORAL DAILY
Qty: 90 TABLET | Refills: 1 | Status: SHIPPED | OUTPATIENT
Start: 2024-11-14

## 2024-11-14 RX ORDER — SPIRONOLACTONE 25 MG/1
25 TABLET ORAL DAILY
Qty: 90 TABLET | Refills: 1 | Status: SHIPPED | OUTPATIENT
Start: 2024-11-14

## 2024-11-14 RX ORDER — FUROSEMIDE 20 MG/1
20 TABLET ORAL DAILY
Qty: 90 TABLET | Refills: 1 | Status: SHIPPED | OUTPATIENT
Start: 2024-11-14

## 2024-11-14 RX ORDER — MONTELUKAST SODIUM 10 MG/1
10 TABLET ORAL NIGHTLY
COMMUNITY

## 2024-11-14 RX ORDER — TRAZODONE HYDROCHLORIDE 150 MG/1
150 TABLET ORAL NIGHTLY
Qty: 90 TABLET | Refills: 1 | Status: SHIPPED | OUTPATIENT
Start: 2024-11-14

## 2024-11-15 LAB
ALBUMIN SERPL-MCNC: 3.8 G/DL (ref 3.5–5)
ALBUMIN/GLOB SERPL: 1.2 (ref 1.1–2.2)
ALP SERPL-CCNC: 105 U/L (ref 45–117)
ALT SERPL-CCNC: 25 U/L (ref 12–78)
ANION GAP SERPL CALC-SCNC: 5 MMOL/L (ref 2–12)
AST SERPL-CCNC: 13 U/L (ref 15–37)
BILIRUB SERPL-MCNC: 0.7 MG/DL (ref 0.2–1)
BUN SERPL-MCNC: 19 MG/DL (ref 6–20)
BUN/CREAT SERPL: 15 (ref 12–20)
CALCIUM SERPL-MCNC: 9.8 MG/DL (ref 8.5–10.1)
CHLORIDE SERPL-SCNC: 111 MMOL/L (ref 97–108)
CHOLEST SERPL-MCNC: 139 MG/DL
CO2 SERPL-SCNC: 25 MMOL/L (ref 21–32)
CREAT SERPL-MCNC: 1.27 MG/DL (ref 0.7–1.3)
ERYTHROCYTE [DISTWIDTH] IN BLOOD BY AUTOMATED COUNT: 14.2 % (ref 11.5–14.5)
GLOBULIN SER CALC-MCNC: 3.3 G/DL (ref 2–4)
GLUCOSE SERPL-MCNC: 80 MG/DL (ref 65–100)
HCT VFR BLD AUTO: 39.8 % (ref 36.6–50.3)
HDLC SERPL-MCNC: 42 MG/DL
HDLC SERPL: 3.3 (ref 0–5)
HGB BLD-MCNC: 12.9 G/DL (ref 12.1–17)
LDLC SERPL CALC-MCNC: 76.6 MG/DL (ref 0–100)
MCH RBC QN AUTO: 29.9 PG (ref 26–34)
MCHC RBC AUTO-ENTMCNC: 32.4 G/DL (ref 30–36.5)
MCV RBC AUTO: 92.3 FL (ref 80–99)
NRBC # BLD: 0 K/UL (ref 0–0.01)
NRBC BLD-RTO: 0 PER 100 WBC
PLATELET # BLD AUTO: 259 K/UL (ref 150–400)
PMV BLD AUTO: 9.8 FL (ref 8.9–12.9)
POTASSIUM SERPL-SCNC: 4.4 MMOL/L (ref 3.5–5.1)
PROT SERPL-MCNC: 7.1 G/DL (ref 6.4–8.2)
RBC # BLD AUTO: 4.31 M/UL (ref 4.1–5.7)
SODIUM SERPL-SCNC: 141 MMOL/L (ref 136–145)
TRIGL SERPL-MCNC: 102 MG/DL
TSH SERPL DL<=0.05 MIU/L-ACNC: 2.15 UIU/ML (ref 0.36–3.74)
VLDLC SERPL CALC-MCNC: 20.4 MG/DL
WBC # BLD AUTO: 10 K/UL (ref 4.1–11.1)

## 2024-11-21 NOTE — PROGRESS NOTES
Chief Complaint   Patient presents with    Follow-up Chronic Condition      \"Have you been to the ER, urgent care clinic since your last visit?  Hospitalized since your last visit?\"    NO    “Have you seen or consulted any other health care providers outside of [facility] since your last visit?”    NO            Click Here for Release of Records Request     Health Maintenance Due   Topic Date Due    Flu vaccine (1) Never done    COVID-19 Vaccine (1 - 2023-24 season) Never done     
for this visit.     Allergies   Allergen Reactions    Oxycodone-Acetaminophen Itching and Rash     itching       Review of Systems:  Constitutional: Negative for fatigue, malaise  Resp: Negative for cough, wheezing or SOB  CV: see HPI, Negative for chest pain, dizziness or palpitations  GI: Negative for abdominal pain  MS: see HPI  Neuro: Negative for HA, weakness or paresthesia  Psych: see HPI    Vitals:    11/14/24 0844   BP: 130/82   Site: Right Upper Arm   Position: Sitting   Cuff Size: Medium Adult   Pulse: 60   Resp: 16   Temp: 98.5 °F (36.9 °C)   TempSrc: Infrared   SpO2: 99%   Weight: 65.5 kg (144 lb 6.4 oz)   Height: 1.829 m (6')       Physical Examination:  General: thin, in no acute distress  Head: Normocephalic, atraumatic  Eyes: Sclera clear, EOMI  Neck: Normal range of motion  Respiratory: Symmetrical, unlabored effort  Cardiovascular: Regular rate and rhythm  Extremities: Full range of motion, antalgic gait  Neurologic: No focal deficits  Psych: Active, alert and oriented. Anxious affect     Diagnosis Orders   1. Hyperlipidemia, unspecified hyperlipidemia type  atorvastatin (LIPITOR) 80 MG tablet    Comprehensive Metabolic Panel    Lipid Panel    Lipid Panel    Comprehensive Metabolic Panel      2. Cardiomyopathy, unspecified type (HCC)  carvedilol (COREG) 6.25 MG tablet    clopidogrel (PLAVIX) 75 MG tablet    spironolactone (ALDACTONE) 25 MG tablet    LASIX 20 MG tablet      3. Essential (primary) hypertension  carvedilol (COREG) 6.25 MG tablet    CBC    TSH    TSH    CBC      4. Gastro-esophageal reflux disease without esophagitis  famotidine (PEPCID) 20 MG tablet    CBC    CBC      5. Benign prostatic hyperplasia with lower urinary tract symptoms, symptom details unspecified  tamsulosin (FLOMAX) 0.4 MG capsule      6. Primary insomnia  traZODone (DESYREL) 150 MG tablet            Plan of care:  Diagnoses were discussed in detail with patient.   He will restart Duloxetine.  Other medications

## 2024-12-04 DIAGNOSIS — I42.9 CARDIOMYOPATHY, UNSPECIFIED TYPE (HCC): ICD-10-CM

## 2024-12-05 RX ORDER — SACUBITRIL AND VALSARTAN 24; 26 MG/1; MG/1
1 TABLET, FILM COATED ORAL 2 TIMES DAILY
Qty: 60 TABLET | Refills: 2 | Status: SHIPPED | OUTPATIENT
Start: 2024-12-05

## 2024-12-10 ENCOUNTER — TELEPHONE (OUTPATIENT)
Facility: CLINIC | Age: 69
End: 2024-12-10

## 2024-12-10 NOTE — TELEPHONE ENCOUNTER
Care Transitions Initial Follow Up Call    Outreach made within 2 business days of discharge: Yes    Patient: Jayden Gonzales Patient : 1955   MRN: [id number]  Reason for Admission: mental issues, hip pain  Discharge Date: 24       Spoke with: no answer, no VM    Discharge department/facility: [facility]

## 2024-12-10 NOTE — TELEPHONE ENCOUNTER
Care Transitions Initial Follow Up Call    Outreach made within 2 business days of discharge: Yes    Patient: Jayden Gonzales Patient : 1955   MRN: [id number]  Reason for Admission: mental issues, hip pain  Discharge Date: 24       Spoke with: patient    Discharge department/facility: [facility] Patient Contact:  Was patient able to fill all prescriptions: picking up meds today  Was patient instructed to bring all medications to the follow-up visit: Yes  Is patient taking all medications as directed in the discharge summary? Yes  Does patient understand their discharge instructions: Yes  Does patient have questions or concerns that need addressed prior to 7-14 day follow up office visit: no    Additional needs identified to be addressed with provider  No needs identified             Scheduled appointment with PCP within 7-14 days: yes    Follow Up  Future Appointments   Date Time Provider Department Center   2024  1:00 PM Santy Barbour MD [facility]   2025 10:20 AM Santy Barbour MD [facility]       SAMSON MONSALVE RN

## 2024-12-10 NOTE — TELEPHONE ENCOUNTER
Pt called back, pt states he is ok. Pt is aware of h/f appt on the 18th. Pt not happy about follow up phone call, but did say he was picking up a medication from the pharmacy later today.

## 2024-12-18 ENCOUNTER — OFFICE VISIT (OUTPATIENT)
Facility: CLINIC | Age: 69
End: 2024-12-18

## 2024-12-18 VITALS
HEART RATE: 67 BPM | WEIGHT: 147 LBS | HEIGHT: 72 IN | DIASTOLIC BLOOD PRESSURE: 62 MMHG | BODY MASS INDEX: 19.91 KG/M2 | OXYGEN SATURATION: 96 % | TEMPERATURE: 98.1 F | RESPIRATION RATE: 18 BRPM | SYSTOLIC BLOOD PRESSURE: 97 MMHG

## 2024-12-18 DIAGNOSIS — L29.9 PRURITIC CONDITION: ICD-10-CM

## 2024-12-18 DIAGNOSIS — Z09 HOSPITAL DISCHARGE FOLLOW-UP: Primary | ICD-10-CM

## 2024-12-18 DIAGNOSIS — F51.01 PRIMARY INSOMNIA: ICD-10-CM

## 2024-12-18 RX ORDER — BUPROPION HYDROCHLORIDE 300 MG/1
300 TABLET ORAL EVERY MORNING
COMMUNITY

## 2024-12-18 RX ORDER — ERGOCALCIFEROL 1.25 MG/1
50000 CAPSULE ORAL WEEKLY
COMMUNITY

## 2024-12-18 RX ORDER — HYDROXYZINE HYDROCHLORIDE 10 MG/1
10 TABLET, FILM COATED ORAL 3 TIMES DAILY PRN
Qty: 30 TABLET | Refills: 2 | Status: SHIPPED | OUTPATIENT
Start: 2024-12-18

## 2024-12-25 NOTE — PROGRESS NOTES
Chief Complaint   Patient presents with    Follow-Up from Hospital         \"Have you been to the ER, urgent care clinic since your last visit?  Hospitalized since your last visit?\"    YES- [facility]    “Have you seen or consulted any other health care providers outside of [facility] since your last visit?”    NO            Click Here for Release of Records Request     Health Maintenance Due   Topic Date Due    Flu vaccine (1) Never done    COVID-19 Vaccine (1 - 2023-24 season) Never done       
effort  Cardiovascular: Regular rate and rhythm  Extremities: Full range of motion, antalgic gait  Neurologic: No focal deficits  Psych: Active, alert and oriented. Anxious affect     Diagnosis Orders   1. Hospital discharge follow-up  ID DISCHARGE MEDS RECONCILED W/ CURRENT OUTPATIENT MED LIST      2. Pruritic condition  hydrOXYzine HCl (ATARAX) 10 MG tablet      3. Primary insomnia              Plan of care:  Diagnoses were discussed in detail with patient.   He will restart Duloxetine.  Other medications reviewed and appropriate.  Patient to continue current prescribed medications as written.    Medication risks/benefits/side effects discussed with patient.   All of the patient's questions were addressed and answered to apparent satisfaction.   The patient understands and agrees with our plan of care.  The patient knows to call back if they have questions about the plan of care or if symptoms change.  The patient received an After-Visit Summary which contains VS, diagnoses, orders, allergy and medication lists.      Future Appointments   Date Time Provider Department Center   2/14/2025  8:00 AM Santy Barbour MD [facility] DEP

## 2025-01-21 ENCOUNTER — TELEPHONE (OUTPATIENT)
Facility: CLINIC | Age: 70
End: 2025-01-21

## 2025-01-21 DIAGNOSIS — N40.1 BENIGN PROSTATIC HYPERPLASIA WITH LOWER URINARY TRACT SYMPTOMS, SYMPTOM DETAILS UNSPECIFIED: ICD-10-CM

## 2025-01-21 RX ORDER — TAMSULOSIN HYDROCHLORIDE 0.4 MG/1
0.4 CAPSULE ORAL DAILY
Qty: 90 CAPSULE | Refills: 1 | Status: SHIPPED | OUTPATIENT
Start: 2025-01-21

## 2025-01-21 NOTE — TELEPHONE ENCOUNTER
Pt would like med to be refilled. Please send to [facility]. Pt would like call back one put in. Please advise. tamsulosin (FLOMAX) 0.4 MG capsule

## 2025-02-14 ENCOUNTER — OFFICE VISIT (OUTPATIENT)
Facility: CLINIC | Age: 70
End: 2025-02-14

## 2025-02-14 VITALS
HEIGHT: 72 IN | BODY MASS INDEX: 20.99 KG/M2 | WEIGHT: 155 LBS | DIASTOLIC BLOOD PRESSURE: 86 MMHG | TEMPERATURE: 98.4 F | RESPIRATION RATE: 18 BRPM | OXYGEN SATURATION: 96 % | SYSTOLIC BLOOD PRESSURE: 126 MMHG | HEART RATE: 77 BPM

## 2025-02-14 DIAGNOSIS — E78.5 HYPERLIPIDEMIA, UNSPECIFIED HYPERLIPIDEMIA TYPE: ICD-10-CM

## 2025-02-14 DIAGNOSIS — I10 ESSENTIAL (PRIMARY) HYPERTENSION: Primary | ICD-10-CM

## 2025-02-14 DIAGNOSIS — J43.8 OTHER EMPHYSEMA (HCC): ICD-10-CM

## 2025-02-14 DIAGNOSIS — I50.22 CHRONIC SYSTOLIC CONGESTIVE HEART FAILURE (HCC): ICD-10-CM

## 2025-02-14 DIAGNOSIS — N40.1 BENIGN PROSTATIC HYPERPLASIA WITH LOWER URINARY TRACT SYMPTOMS, SYMPTOM DETAILS UNSPECIFIED: ICD-10-CM

## 2025-02-14 DIAGNOSIS — F51.01 PRIMARY INSOMNIA: ICD-10-CM

## 2025-02-14 RX ORDER — MONTELUKAST SODIUM 10 MG/1
10 TABLET ORAL NIGHTLY
Qty: 90 TABLET | Refills: 1 | Status: SHIPPED | OUTPATIENT
Start: 2025-02-14

## 2025-02-14 RX ORDER — SACUBITRIL AND VALSARTAN 24; 26 MG/1; MG/1
1 TABLET, FILM COATED ORAL 2 TIMES DAILY
Qty: 180 TABLET | Refills: 1 | Status: SHIPPED | OUTPATIENT
Start: 2025-02-14

## 2025-02-14 RX ORDER — TRAZODONE HYDROCHLORIDE 150 MG/1
TABLET ORAL
Qty: 120 TABLET | Refills: 1 | Status: SHIPPED | OUTPATIENT
Start: 2025-02-14

## 2025-02-14 RX ORDER — TAMSULOSIN HYDROCHLORIDE 0.4 MG/1
0.8 CAPSULE ORAL DAILY
Qty: 180 CAPSULE | Refills: 1 | Status: SHIPPED | OUTPATIENT
Start: 2025-02-14

## 2025-02-14 SDOH — ECONOMIC STABILITY: FOOD INSECURITY: WITHIN THE PAST 12 MONTHS, YOU WORRIED THAT YOUR FOOD WOULD RUN OUT BEFORE YOU GOT MONEY TO BUY MORE.: SOMETIMES TRUE

## 2025-02-14 SDOH — ECONOMIC STABILITY: FOOD INSECURITY: WITHIN THE PAST 12 MONTHS, THE FOOD YOU BOUGHT JUST DIDN'T LAST AND YOU DIDN'T HAVE MONEY TO GET MORE.: SOMETIMES TRUE

## 2025-02-14 ASSESSMENT — PATIENT HEALTH QUESTIONNAIRE - PHQ9
2. FEELING DOWN, DEPRESSED OR HOPELESS: SEVERAL DAYS
4. FEELING TIRED OR HAVING LITTLE ENERGY: SEVERAL DAYS
1. LITTLE INTEREST OR PLEASURE IN DOING THINGS: SEVERAL DAYS
7. TROUBLE CONCENTRATING ON THINGS, SUCH AS READING THE NEWSPAPER OR WATCHING TELEVISION: SEVERAL DAYS
SUM OF ALL RESPONSES TO PHQ9 QUESTIONS 1 & 2: 2
8. MOVING OR SPEAKING SO SLOWLY THAT OTHER PEOPLE COULD HAVE NOTICED. OR THE OPPOSITE, BEING SO FIGETY OR RESTLESS THAT YOU HAVE BEEN MOVING AROUND A LOT MORE THAN USUAL: SEVERAL DAYS
SUM OF ALL RESPONSES TO PHQ QUESTIONS 1-9: 10
5. POOR APPETITE OR OVEREATING: SEVERAL DAYS
10. IF YOU CHECKED OFF ANY PROBLEMS, HOW DIFFICULT HAVE THESE PROBLEMS MADE IT FOR YOU TO DO YOUR WORK, TAKE CARE OF THINGS AT HOME, OR GET ALONG WITH OTHER PEOPLE: SOMEWHAT DIFFICULT
9. THOUGHTS THAT YOU WOULD BE BETTER OFF DEAD, OR OF HURTING YOURSELF: NOT AT ALL
SUM OF ALL RESPONSES TO PHQ QUESTIONS 1-9: 10
6. FEELING BAD ABOUT YOURSELF - OR THAT YOU ARE A FAILURE OR HAVE LET YOURSELF OR YOUR FAMILY DOWN: SEVERAL DAYS
3. TROUBLE FALLING OR STAYING ASLEEP: NEARLY EVERY DAY

## 2025-02-14 NOTE — PROGRESS NOTES
Chief Complaint   Patient presents with    Follow-up Chronic Condition      \"Have you been to the ER, urgent care clinic since your last visit?  Hospitalized since your last visit?\"    NO    “Have you seen or consulted any other health care providers outside of [facility] since your last visit?”    NO            Click Here for Release of Records Request     Health Maintenance Due   Topic Date Due    Flu vaccine (1) Never done    COVID-19 Vaccine (1 - 2024-25 season) Never done    Annual Wellness Visit (Medicare Advantage)  01/01/2025

## 2025-02-20 NOTE — PROGRESS NOTES
Progress Note    Patient: Jayden Gonzales MRN: [id number]  SSN: [id number]    YOB: 1955  Age: 69 y.o.  Sex: male        Chief Complaint   Patient presents with    Follow-up Chronic Condition     he is a 69 y.o. year old male who presents for follow up of chronic health conditions. Patient with hx of  NSTEMI and dx of CHF, COPD, insomnia, depression, HLD and chronic LBP. Patient lives alone. He is followed by cardiology and pulmonology. Prior screening lung CT identified a lung nodule. Patient denies F/C, HA, dizziness, SOB, CP, N/V, dysuria, acute weakness or paresthesia.     Encounter Diagnoses   Name Primary?    Essential (primary) hypertension Yes    Hyperlipidemia, unspecified hyperlipidemia type     Other emphysema (HCC)     Chronic systolic congestive heart failure (HCC)     Benign prostatic hyperplasia with lower urinary tract symptoms, symptom details unspecified     Primary insomnia        BP Readings from Last 3 Encounters:   02/14/25 126/86   12/18/24 97/62   11/14/24 130/82     Wt Readings from Last 3 Encounters:   02/14/25 70.3 kg (155 lb)   12/18/24 66.7 kg (147 lb)   11/14/24 65.5 kg (144 lb 6.4 oz)     Body mass index is 21.02 kg/m².    Patient Active Problem List   Diagnosis    History of left hip replacement    Conductive hearing loss, bilateral    Tobacco abuse    Insomnia    Depression    Chronic obstructive pulmonary disease (HCC)    Hyperlipemia    Benzodiazepine contract exists    Recurrent major depressive disorder, in partial remission     Past Surgical History:   Procedure Laterality Date    COLONOSCOPY N/A 9/18/2019    COLONOSCOPY performed by Gato Rubio MD at [facility] ENDOSCOPY    COLONOSCOPY N/A 8/23/2016    COLONOSCOPY performed by Rich Smiley MD at [facility] ENDOSCOPY    COLONOSCOPY      GI      surgery for hemorrhoids    HEENT      all teeth removed    ORTHOPEDIC SURGERY      infection after sutured finger/then healed/then had a growth removed 2 1/2 yrs later - benign

## 2025-03-18 ENCOUNTER — TELEPHONE (OUTPATIENT)
Facility: CLINIC | Age: 70
End: 2025-03-18

## 2025-03-18 NOTE — TELEPHONE ENCOUNTER
Attempted to call pt to get info on hosp follow up, discharge date, which hosp, and diagnosis. Phone did not ring, no VM set up

## 2025-03-25 ENCOUNTER — OFFICE VISIT (OUTPATIENT)
Facility: CLINIC | Age: 70
End: 2025-03-25
Payer: MEDICARE

## 2025-03-25 VITALS
TEMPERATURE: 98.1 F | BODY MASS INDEX: 20.21 KG/M2 | HEART RATE: 60 BPM | OXYGEN SATURATION: 96 % | SYSTOLIC BLOOD PRESSURE: 154 MMHG | DIASTOLIC BLOOD PRESSURE: 92 MMHG | RESPIRATION RATE: 20 BRPM | WEIGHT: 149.2 LBS | HEIGHT: 72 IN

## 2025-03-25 DIAGNOSIS — I10 ESSENTIAL (PRIMARY) HYPERTENSION: ICD-10-CM

## 2025-03-25 DIAGNOSIS — S22.31XA CLOSED FRACTURE OF ONE RIB OF RIGHT SIDE, INITIAL ENCOUNTER: Primary | ICD-10-CM
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
## 2025-03-25 DIAGNOSIS — I50.22 CHRONIC SYSTOLIC CONGESTIVE HEART FAILURE (HCC): ICD-10-CM

## 2025-03-25 DIAGNOSIS — M48.061 SPINAL STENOSIS, LUMBAR REGION WITHOUT NEUROGENIC CLAUDICATION: ICD-10-CM

## 2025-03-25 PROCEDURE — 1123F ACP DISCUSS/DSCN MKR DOCD: CPT | Performed by: FAMILY MEDICINE

## 2025-03-25 PROCEDURE — 3080F DIAST BP >= 90 MM HG: CPT | Performed by: FAMILY MEDICINE

## 2025-03-25 PROCEDURE — 3077F SYST BP >= 140 MM HG: CPT | Performed by: FAMILY MEDICINE

## 2025-03-25 PROCEDURE — 99214 OFFICE O/P EST MOD 30 MIN: CPT | Performed by: FAMILY MEDICINE

## 2025-03-25 PROCEDURE — G2211 COMPLEX E/M VISIT ADD ON: HCPCS | Performed by: FAMILY MEDICINE

## 2025-03-25 PROCEDURE — 1125F AMNT PAIN NOTED PAIN PRSNT: CPT | Performed by: FAMILY MEDICINE

## 2025-03-25 RX ORDER — LACTULOSE 10 G/15ML
30 SOLUTION ORAL 2 TIMES DAILY
Qty: 473 ML | Refills: 1 | Status: SHIPPED | OUTPATIENT
Start: 2025-03-25

## 2025-03-25 RX ORDER — HYDROCODONE BITARTRATE AND ACETAMINOPHEN 7.5; 325 MG/1; MG/1
1 TABLET ORAL EVERY 6 HOURS PRN
COMMUNITY
Start: 2025-03-19

## 2025-03-25 NOTE — PROGRESS NOTES
Chief Complaint   Patient presents with    Follow-up     [facility]      \"Have you been to the ER, urgent care clinic since your last visit?  Hospitalized since your last visit?\"    YES, [facility]    “Have you seen or consulted any other health care providers outside of [facility] since your last visit?”    NO            Click Here for Release of Records Request     Health Maintenance Due   Topic Date Due    Flu vaccine (1) Never done    COVID-19 Vaccine (1 - 2024-25 season) Never done    Annual Wellness Visit (Medicare Advantage)  01/01/2025

## 2025-03-27 NOTE — PROGRESS NOTES
Progress Note    Patient: Jayden Gonzales MRN: [id number]  SSN: [id number]    YOB: 1955  Age: 69 y.o.  Sex: male        Chief Complaint   Patient presents with    Follow-up     [facility]     he is a 69 y.o. year old male who presents for follow up of chronic health conditions. [facility] s/p fall in shower. Patient with hx of NSTEMI and dx of CHF, COPD, insomnia, depression, HLD and chronic LBP. Patient lives alone. He is followed by cardiology and pulmonology. Prior screening lung CT identified a lung nodule. Patient denies F/C, HA, dizziness, SOB, CP, N/V, dysuria, acute weakness or paresthesia.     Encounter Diagnoses   Name Primary?    Closed fracture of one rib of right side, initial encounter Yes    Fall in shower     Spinal stenosis, lumbar region without neurogenic claudication     Essential (primary) hypertension     Chronic systolic congestive heart failure (HCC)          BP Readings from Last 3 Encounters:   03/25/25 (!) 154/92   02/14/25 126/86   12/18/24 97/62     Wt Readings from Last 3 Encounters:   03/25/25 67.7 kg (149 lb 3.2 oz)   02/14/25 70.3 kg (155 lb)   12/18/24 66.7 kg (147 lb)     Body mass index is 20.24 kg/m².    Patient Active Problem List   Diagnosis    History of left hip replacement    Conductive hearing loss, bilateral    Tobacco abuse    Insomnia    Depression    Chronic obstructive pulmonary disease (HCC)    Hyperlipemia    Benzodiazepine contract exists    Recurrent major depressive disorder, in partial remission     Past Surgical History:   Procedure Laterality Date    COLONOSCOPY N/A 9/18/2019    COLONOSCOPY performed by Gato Rubio MD at [facility] ENDOSCOPY    COLONOSCOPY N/A 8/23/2016    COLONOSCOPY performed by Rich Smiley MD at [facility] ENDOSCOPY    COLONOSCOPY      GI      surgery for hemorrhoids    HEENT      all teeth removed    ORTHOPEDIC SURGERY      infection after sutured finger/then healed/then had a growth removed 2 1/2

## 2025-03-31 DIAGNOSIS — K64.0 FIRST DEGREE HEMORRHOIDS: ICD-10-CM

## 2025-03-31 RX ORDER — HYDROCORTISONE 25 MG/G
CREAM TOPICAL
Qty: 30 G | Refills: 1 | Status: SHIPPED | OUTPATIENT
Start: 2025-03-31

## 2025-04-10 ENCOUNTER — OFFICE VISIT (OUTPATIENT)
Facility: CLINIC | Age: 70
End: 2025-04-10
Payer: MEDICARE

## 2025-04-10 VITALS
DIASTOLIC BLOOD PRESSURE: 94 MMHG | TEMPERATURE: 99.1 F | OXYGEN SATURATION: 100 % | HEART RATE: 77 BPM | RESPIRATION RATE: 16 BRPM | BODY MASS INDEX: 20.56 KG/M2 | HEIGHT: 72 IN | WEIGHT: 151.8 LBS | SYSTOLIC BLOOD PRESSURE: 139 MMHG

## 2025-04-10 DIAGNOSIS — Z91.81 AT HIGH RISK FOR INJURY RELATED TO FALL: ICD-10-CM

## 2025-04-10 DIAGNOSIS — S22.43XD MULTIPLE FRACTURES OF RIBS, BILATERAL, SUBSEQUENT ENCOUNTER FOR FRACTURE WITH ROUTINE HEALING: ICD-10-CM

## 2025-04-10 DIAGNOSIS — H53.9 VISION CHANGES: ICD-10-CM

## 2025-04-10 DIAGNOSIS — E78.5 HYPERLIPIDEMIA, UNSPECIFIED HYPERLIPIDEMIA TYPE: ICD-10-CM

## 2025-04-10 DIAGNOSIS — I10 ESSENTIAL (PRIMARY) HYPERTENSION: Primary | ICD-10-CM

## 2025-04-10 DIAGNOSIS — F33.41 MAJOR DEPRESSIVE DISORDER, RECURRENT, IN PARTIAL REMISSION: ICD-10-CM

## 2025-04-10 DIAGNOSIS — J43.8 OTHER EMPHYSEMA (HCC): ICD-10-CM

## 2025-04-10 DIAGNOSIS — I50.22 CHRONIC SYSTOLIC CONGESTIVE HEART FAILURE (HCC): ICD-10-CM

## 2025-04-10 PROCEDURE — 3080F DIAST BP >= 90 MM HG: CPT | Performed by: FAMILY MEDICINE

## 2025-04-10 PROCEDURE — 99214 OFFICE O/P EST MOD 30 MIN: CPT | Performed by: FAMILY MEDICINE

## 2025-04-10 PROCEDURE — 1123F ACP DISCUSS/DSCN MKR DOCD: CPT | Performed by: FAMILY MEDICINE

## 2025-04-10 PROCEDURE — 3075F SYST BP GE 130 - 139MM HG: CPT | Performed by: FAMILY MEDICINE

## 2025-04-10 PROCEDURE — G2211 COMPLEX E/M VISIT ADD ON: HCPCS | Performed by: FAMILY MEDICINE

## 2025-04-10 RX ORDER — NAPROXEN 375 MG/1
375 TABLET ORAL 2 TIMES DAILY
COMMUNITY
Start: 2025-03-05

## 2025-04-10 RX ORDER — ATORVASTATIN CALCIUM 80 MG/1
80 TABLET, FILM COATED ORAL DAILY
Qty: 90 TABLET | Refills: 1 | Status: CANCELLED | OUTPATIENT
Start: 2025-04-10

## 2025-04-10 RX ORDER — CYCLOBENZAPRINE HCL 10 MG
10 TABLET ORAL
COMMUNITY
Start: 2025-04-03 | End: 2025-04-29

## 2025-04-10 ASSESSMENT — PATIENT HEALTH QUESTIONNAIRE - PHQ9
SUM OF ALL RESPONSES TO PHQ QUESTIONS 1-9: 11
8. MOVING OR SPEAKING SO SLOWLY THAT OTHER PEOPLE COULD HAVE NOTICED. OR THE OPPOSITE, BEING SO FIGETY OR RESTLESS THAT YOU HAVE BEEN MOVING AROUND A LOT MORE THAN USUAL: NOT AT ALL
1. LITTLE INTEREST OR PLEASURE IN DOING THINGS: MORE THAN HALF THE DAYS
7. TROUBLE CONCENTRATING ON THINGS, SUCH AS READING THE NEWSPAPER OR WATCHING TELEVISION: NOT AT ALL
SUM OF ALL RESPONSES TO PHQ QUESTIONS 1-9: 11
SUM OF ALL RESPONSES TO PHQ QUESTIONS 1-9: 11
6. FEELING BAD ABOUT YOURSELF - OR THAT YOU ARE A FAILURE OR HAVE LET YOURSELF OR YOUR FAMILY DOWN: NOT AT ALL
4. FEELING TIRED OR HAVING LITTLE ENERGY: NEARLY EVERY DAY
3. TROUBLE FALLING OR STAYING ASLEEP: NEARLY EVERY DAY
10. IF YOU CHECKED OFF ANY PROBLEMS, HOW DIFFICULT HAVE THESE PROBLEMS MADE IT FOR YOU TO DO YOUR WORK, TAKE CARE OF THINGS AT HOME, OR GET ALONG WITH OTHER PEOPLE: NOT DIFFICULT AT ALL
2. FEELING DOWN, DEPRESSED OR HOPELESS: MORE THAN HALF THE DAYS
5. POOR APPETITE OR OVEREATING: SEVERAL DAYS
SUM OF ALL RESPONSES TO PHQ QUESTIONS 1-9: 11
9. THOUGHTS THAT YOU WOULD BE BETTER OFF DEAD, OR OF HURTING YOURSELF: NOT AT ALL

## 2025-04-10 NOTE — PROGRESS NOTES
Chief Complaint   Patient presents with    Medicare AWV    Follow-up Chronic Condition        \"Have you been to the ER, urgent care clinic since your last visit?  Hospitalized since your last visit?\"    Yes [facility]     “Have you seen or consulted any other health care providers outside of [facility] since your last visit?”    NO            Click Here for Release of Records Request     Health Maintenance Due   Topic Date Due    COVID-19 Vaccine (1 - 2024-25 season) Never done    Annual Wellness Visit (Medicare Advantage)  01/01/2025

## 2025-04-10 NOTE — PROGRESS NOTES
Progress Note    Patient: Jayden Gonzales MRN: [id number]  SSN: [id number]    YOB: 1955  Age: 69 y.o.  Sex: male        Chief Complaint   Patient presents with    Follow-up Chronic Condition     he is a 69 y.o. year old male who presents for follow up of chronic health conditions. He has had a rib fracture s/p fall in shower. Patient with hx of NSTEMI and dx of CHF, COPD, insomnia, depression, HLD and chronic LBP. Patient lives alone. He is followed by cardiology and pulmonology. Prior screening lung CT identified a lung nodule. He is concerned about vision changes. Patient denies F/C, HA, dizziness, SOB, CP, N/V, dysuria, acute weakness or paresthesia.     Encounter Diagnoses   Name Primary?    Essential (primary) hypertension Yes    Chronic systolic congestive heart failure (HCC)     Hyperlipidemia, unspecified hyperlipidemia type     Other emphysema (HCC)     Vision changes     Multiple fractures of ribs, bilateral, subsequent encounter for fracture with routine healing     At high risk for injury related to fall     Major depressive disorder, recurrent, in partial remission          BP Readings from Last 3 Encounters:   04/10/25 (!) 139/94   03/25/25 (!) 154/92   02/14/25 126/86     Wt Readings from Last 3 Encounters:   04/10/25 68.9 kg (151 lb 12.8 oz)   03/25/25 67.7 kg (149 lb 3.2 oz)   02/14/25 70.3 kg (155 lb)     Body mass index is 20.59 kg/m².    Patient Active Problem List   Diagnosis    History of left hip replacement    Conductive hearing loss, bilateral    Tobacco abuse    Insomnia    Depression    Chronic obstructive pulmonary disease (HCC)    Hyperlipemia    Benzodiazepine contract exists    Recurrent major depressive disorder, in partial remission     Past Surgical History:   Procedure Laterality Date    COLONOSCOPY N/A 9/18/2019    COLONOSCOPY performed by Gato Rubio MD at [facility] ENDOSCOPY    COLONOSCOPY N/A 8/23/2016    COLONOSCOPY performed by Rich Smiley MD at [facility] ENDOSCOPY

## 2025-04-18 ENCOUNTER — OFFICE VISIT (OUTPATIENT)
Facility: CLINIC | Age: 70
End: 2025-04-18
Payer: MEDICARE

## 2025-04-18 VITALS
BODY MASS INDEX: 19.91 KG/M2 | HEART RATE: 82 BPM | TEMPERATURE: 98.6 F | HEIGHT: 72 IN | DIASTOLIC BLOOD PRESSURE: 80 MMHG | RESPIRATION RATE: 16 BRPM | OXYGEN SATURATION: 98 % | SYSTOLIC BLOOD PRESSURE: 115 MMHG | WEIGHT: 147 LBS

## 2025-04-18 DIAGNOSIS — I10 ESSENTIAL (PRIMARY) HYPERTENSION: ICD-10-CM

## 2025-04-18 DIAGNOSIS — H53.9 VISION CHANGES: Primary | ICD-10-CM

## 2025-04-18 DIAGNOSIS — J43.8 OTHER EMPHYSEMA (HCC): ICD-10-CM

## 2025-04-18 DIAGNOSIS — F33.41 MAJOR DEPRESSIVE DISORDER, RECURRENT, IN PARTIAL REMISSION: ICD-10-CM

## 2025-04-18 DIAGNOSIS — I50.22 CHRONIC SYSTOLIC CONGESTIVE HEART FAILURE (HCC): ICD-10-CM

## 2025-04-18 DIAGNOSIS — E78.5 HYPERLIPIDEMIA, UNSPECIFIED HYPERLIPIDEMIA TYPE: ICD-10-CM

## 2025-04-18 PROCEDURE — 3079F DIAST BP 80-89 MM HG: CPT | Performed by: FAMILY MEDICINE

## 2025-04-18 PROCEDURE — G2211 COMPLEX E/M VISIT ADD ON: HCPCS | Performed by: FAMILY MEDICINE

## 2025-04-18 PROCEDURE — 1123F ACP DISCUSS/DSCN MKR DOCD: CPT | Performed by: FAMILY MEDICINE

## 2025-04-18 PROCEDURE — 99214 OFFICE O/P EST MOD 30 MIN: CPT | Performed by: FAMILY MEDICINE

## 2025-04-18 PROCEDURE — 3074F SYST BP LT 130 MM HG: CPT | Performed by: FAMILY MEDICINE

## 2025-04-18 NOTE — PROGRESS NOTES
Chief Complaint   Patient presents with    Forms        \"Have you been to the ER, urgent care clinic since your last visit?  Hospitalized since your last visit?\"    NO    “Have you seen or consulted any other health care providers outside of [facility] since your last visit?”    NO            Click Here for Release of Records Request     Health Maintenance Due   Topic Date Due    COVID-19 Vaccine (1 - 2024-25 season) Never done    Annual Wellness Visit (Medicare Advantage)  01/01/2025

## 2025-04-22 DIAGNOSIS — J44.9 CHRONIC OBSTRUCTIVE PULMONARY DISEASE, UNSPECIFIED COPD TYPE (HCC): ICD-10-CM

## 2025-04-22 DIAGNOSIS — F33.41 MAJOR DEPRESSIVE DISORDER, RECURRENT, IN PARTIAL REMISSION: ICD-10-CM

## 2025-04-22 RX ORDER — CYCLOBENZAPRINE HCL 10 MG
10 TABLET ORAL 2 TIMES DAILY PRN
Qty: 60 TABLET | Refills: 0 | Status: SHIPPED | OUTPATIENT
Start: 2025-04-22 | End: 2025-05-22

## 2025-04-22 RX ORDER — TIOTROPIUM BROMIDE 18 UG/1
18 CAPSULE ORAL; RESPIRATORY (INHALATION) DAILY
Qty: 30 CAPSULE | Refills: 2 | Status: SHIPPED | OUTPATIENT
Start: 2025-04-22

## 2025-04-22 RX ORDER — NAPROXEN 375 MG/1
375 TABLET ORAL 2 TIMES DAILY
Qty: 60 TABLET | Refills: 0 | Status: SHIPPED | OUTPATIENT
Start: 2025-04-22

## 2025-04-22 RX ORDER — BUPROPION HYDROCHLORIDE 300 MG/1
300 TABLET ORAL EVERY MORNING
Qty: 90 TABLET | Refills: 0 | Status: SHIPPED | OUTPATIENT
Start: 2025-04-22

## 2025-04-22 NOTE — TELEPHONE ENCOUNTER
buPROPion (WELLBUTRIN XL) 300 MG extended release table   tiotropium (SPIRIVA) 18 MCG inhalation capsule   Pt will like meds refilled. Please send to [facility].     Dina will like to know if pcp can refills these meds as well. States pt is still having significant left rib cage pain. Please advise.  cyclobenzaprine (FLEXERIL) 10 MG tablet   Naproxen (EC NAPROSYN) 375 MG EC tablet

## 2025-04-24 ENCOUNTER — TELEPHONE (OUTPATIENT)
Facility: CLINIC | Age: 70
End: 2025-04-24

## 2025-04-24 NOTE — TELEPHONE ENCOUNTER
Called patient. He wanted to make sure that Dr Barbour got his DMV papers that need to be filled out. He was advised Dr Barbour rec'd papers and when completed office will give him a call to . Verbalized understanding.

## 2025-04-24 NOTE — TELEPHONE ENCOUNTER
Pt states he dropped off papers yesterday at the  for Dr Barbour to fill out. Pt states they were DMV documents. Please advise.
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
## 2025-05-07 DIAGNOSIS — I42.9 CARDIOMYOPATHY, UNSPECIFIED TYPE (HCC): ICD-10-CM

## 2025-05-07 DIAGNOSIS — E78.5 HYPERLIPIDEMIA, UNSPECIFIED HYPERLIPIDEMIA TYPE: ICD-10-CM

## 2025-05-07 DIAGNOSIS — F33.41 MAJOR DEPRESSIVE DISORDER, RECURRENT, IN PARTIAL REMISSION: ICD-10-CM

## 2025-05-07 DIAGNOSIS — G89.4 CHRONIC PAIN SYNDROME: ICD-10-CM

## 2025-05-07 RX ORDER — ATORVASTATIN CALCIUM 80 MG/1
80 TABLET, FILM COATED ORAL DAILY
Qty: 90 TABLET | Refills: 1 | Status: SHIPPED | OUTPATIENT
Start: 2025-05-07

## 2025-05-07 RX ORDER — DULOXETIN HYDROCHLORIDE 30 MG/1
30 CAPSULE, DELAYED RELEASE ORAL DAILY
Qty: 90 CAPSULE | Refills: 1 | Status: SHIPPED | OUTPATIENT
Start: 2025-05-07

## 2025-05-07 RX ORDER — NAPROXEN 375 MG/1
375 TABLET ORAL 2 TIMES DAILY
Qty: 60 TABLET | Refills: 2 | Status: SHIPPED | OUTPATIENT
Start: 2025-05-07 | End: 2025-05-14 | Stop reason: SDUPTHER

## 2025-05-07 RX ORDER — SPIRONOLACTONE 25 MG/1
25 TABLET ORAL DAILY
Qty: 90 TABLET | Refills: 1 | Status: SHIPPED | OUTPATIENT
Start: 2025-05-07

## 2025-05-07 RX ORDER — ERGOCALCIFEROL 1.25 MG/1
50000 CAPSULE ORAL WEEKLY
Qty: 12 CAPSULE | Refills: 1 | Status: SHIPPED | OUTPATIENT
Start: 2025-05-07

## 2025-05-09 RX ORDER — BUPROPION HYDROCHLORIDE 300 MG/1
300 TABLET ORAL EVERY MORNING
Qty: 90 TABLET | Refills: 1 | Status: SHIPPED | OUTPATIENT
Start: 2025-05-09

## 2025-05-09 NOTE — TELEPHONE ENCOUNTER
Mary called stating pt is switching pharmacies over to [facility], he will be receiving pill packs. [facility] has all of pt's rx except for:    -buPROPion (WELLBUTRIN XL) 300 MG extended     Please send over rx to:     [facility]  Phone:[phone]  Fax:[phone]    Please advise...

## 2025-05-14 ENCOUNTER — OFFICE VISIT (OUTPATIENT)
Facility: CLINIC | Age: 70
End: 2025-05-14
Payer: MEDICARE

## 2025-05-14 VITALS
OXYGEN SATURATION: 98 % | SYSTOLIC BLOOD PRESSURE: 101 MMHG | HEIGHT: 72 IN | DIASTOLIC BLOOD PRESSURE: 71 MMHG | WEIGHT: 149 LBS | RESPIRATION RATE: 16 BRPM | BODY MASS INDEX: 20.18 KG/M2 | TEMPERATURE: 98.5 F | HEART RATE: 65 BPM

## 2025-05-14 DIAGNOSIS — E78.5 HYPERLIPIDEMIA, UNSPECIFIED HYPERLIPIDEMIA TYPE: ICD-10-CM

## 2025-05-14 DIAGNOSIS — Z00.00 MEDICARE ANNUAL WELLNESS VISIT, SUBSEQUENT: ICD-10-CM

## 2025-05-14 DIAGNOSIS — I50.22 CHRONIC SYSTOLIC CONGESTIVE HEART FAILURE (HCC): ICD-10-CM

## 2025-05-14 DIAGNOSIS — Z87.891 PERSONAL HISTORY OF TOBACCO USE: ICD-10-CM

## 2025-05-14 DIAGNOSIS — I10 ESSENTIAL (PRIMARY) HYPERTENSION: Primary | ICD-10-CM

## 2025-05-14 DIAGNOSIS — M48.061 SPINAL STENOSIS, LUMBAR REGION WITHOUT NEUROGENIC CLAUDICATION: ICD-10-CM

## 2025-05-14 DIAGNOSIS — J43.8 OTHER EMPHYSEMA (HCC): ICD-10-CM

## 2025-05-14 DIAGNOSIS — N40.1 BENIGN PROSTATIC HYPERPLASIA WITH LOWER URINARY TRACT SYMPTOMS, SYMPTOM DETAILS UNSPECIFIED: ICD-10-CM

## 2025-05-14 PROCEDURE — 3074F SYST BP LT 130 MM HG: CPT | Performed by: FAMILY MEDICINE

## 2025-05-14 PROCEDURE — 1159F MED LIST DOCD IN RCRD: CPT | Performed by: FAMILY MEDICINE

## 2025-05-14 PROCEDURE — 1123F ACP DISCUSS/DSCN MKR DOCD: CPT | Performed by: FAMILY MEDICINE

## 2025-05-14 PROCEDURE — G0439 PPPS, SUBSEQ VISIT: HCPCS | Performed by: FAMILY MEDICINE

## 2025-05-14 PROCEDURE — 3078F DIAST BP <80 MM HG: CPT | Performed by: FAMILY MEDICINE

## 2025-05-14 PROCEDURE — 1160F RVW MEDS BY RX/DR IN RCRD: CPT | Performed by: FAMILY MEDICINE

## 2025-05-14 PROCEDURE — 99214 OFFICE O/P EST MOD 30 MIN: CPT | Performed by: FAMILY MEDICINE

## 2025-05-14 RX ORDER — NAPROXEN 375 MG/1
375 TABLET ORAL 2 TIMES DAILY
Qty: 60 TABLET | Refills: 0 | Status: SHIPPED | OUTPATIENT
Start: 2025-05-14

## 2025-05-14 RX ORDER — NAPROXEN 375 MG/1
375 TABLET ORAL 2 TIMES DAILY
Qty: 60 TABLET | Refills: 2 | Status: SHIPPED | OUTPATIENT
Start: 2025-05-14 | End: 2025-05-14 | Stop reason: SDUPTHER

## 2025-05-14 ASSESSMENT — PATIENT HEALTH QUESTIONNAIRE - PHQ9
4. FEELING TIRED OR HAVING LITTLE ENERGY: NEARLY EVERY DAY
2. FEELING DOWN, DEPRESSED OR HOPELESS: NEARLY EVERY DAY
8. MOVING OR SPEAKING SO SLOWLY THAT OTHER PEOPLE COULD HAVE NOTICED. OR THE OPPOSITE, BEING SO FIGETY OR RESTLESS THAT YOU HAVE BEEN MOVING AROUND A LOT MORE THAN USUAL: NOT AT ALL
9. THOUGHTS THAT YOU WOULD BE BETTER OFF DEAD, OR OF HURTING YOURSELF: NOT AT ALL
SUM OF ALL RESPONSES TO PHQ QUESTIONS 1-9: 17
10. IF YOU CHECKED OFF ANY PROBLEMS, HOW DIFFICULT HAVE THESE PROBLEMS MADE IT FOR YOU TO DO YOUR WORK, TAKE CARE OF THINGS AT HOME, OR GET ALONG WITH OTHER PEOPLE: NOT DIFFICULT AT ALL
3. TROUBLE FALLING OR STAYING ASLEEP: SEVERAL DAYS
SUM OF ALL RESPONSES TO PHQ QUESTIONS 1-9: 17
7. TROUBLE CONCENTRATING ON THINGS, SUCH AS READING THE NEWSPAPER OR WATCHING TELEVISION: NEARLY EVERY DAY
6. FEELING BAD ABOUT YOURSELF - OR THAT YOU ARE A FAILURE OR HAVE LET YOURSELF OR YOUR FAMILY DOWN: NEARLY EVERY DAY
1. LITTLE INTEREST OR PLEASURE IN DOING THINGS: NEARLY EVERY DAY
5. POOR APPETITE OR OVEREATING: SEVERAL DAYS
SUM OF ALL RESPONSES TO PHQ QUESTIONS 1-9: 17
SUM OF ALL RESPONSES TO PHQ QUESTIONS 1-9: 17

## 2025-05-14 ASSESSMENT — LIFESTYLE VARIABLES
HOW OFTEN DO YOU HAVE A DRINK CONTAINING ALCOHOL: NEVER
HOW MANY STANDARD DRINKS CONTAINING ALCOHOL DO YOU HAVE ON A TYPICAL DAY: PATIENT DOES NOT DRINK

## 2025-05-14 NOTE — PROGRESS NOTES
Chief Complaint   Patient presents with    3 Month Follow-Up    Medicare AWV        \"Have you been to the ER, urgent care clinic since your last visit?  Hospitalized since your last visit?\"    NO    “Have you seen or consulted any other health care providers outside of [facility] since your last visit?”    NO            Click Here for Release of Records Request     Health Maintenance Due   Topic Date Due    COVID-19 Vaccine (1 - 2024-25 season) Never done    Annual Wellness Visit (Medicare Advantage)  01/01/2025    Lung Cancer Screening &/or Counseling  05/29/2025       
  albuterol sulfate HFA (PROVENTIL;VENTOLIN;PROAIR) 108 (90 Base) MCG/ACT inhaler Take 2 puffs by mouth every 6 hours as needed Yes Automatic Reconciliation, Ar   tiotropium (SPIRIVA) 18 MCG inhalation capsule Inhale 1 capsule into the lungs daily  Santy Barbour MD   psyllium (KONSYL) 28.3 % PACK Take 1 packet by mouth daily  Patient not taking: Reported on 5/14/2025  Provider, MD Giuseppe   lactulose (CHRONULAC) 10 GM/15ML solution Take 45 mLs by mouth 2 times daily  Patient not taking: Reported on 5/14/2025  Santy Barbour MD   hydrOXYzine HCl (ATARAX) 10 MG tablet Take 1 tablet by mouth 3 times daily as needed for Itching  Patient not taking: Reported on 5/14/2025  Santy Barbour MD       CareTeam (Including outside providers/suppliers regularly involved in providing care):   Patient Care Team:  Santy Barbour MD as PCP - General  Santy Barbour MD as PCP - Empaneled Provider  Mikael Tilley MD (Pulmonary Disease)     Recommendations for Preventive Services Due: see orders and patient instructions/AVS.  Recommended screening schedule for the next 5-10 years is provided to the patient in written form: see Patient Instructions/AVS.     Reviewed and updated this visit:  Tobacco  Allergies  Meds  Problems  Med Hx  Surg Hx  Fam Hx  Sexual   Hx                  Discussed with the patient the current USPSTF guidelines released March 9, 2021 for screening for lung cancer.    For adults aged 50 to 80 years who have a 20 pack-year smoking history and currently smoke or have quit within the past 15 years the grade B recommendation is to:  Screen for lung cancer with low-dose computed tomography (LDCT) every year.  Stop screening once a person has not smoked for 15 years or has a health problem that limits life expectancy or the ability to have lung surgery.    The patient  reports that he quit smoking about 2 years ago. His smoking use included cigarettes. He started smoking about 59 years ago. He has a 28.5

## 2025-05-15 LAB
ALBUMIN SERPL-MCNC: 4.4 G/DL (ref 3.5–5)
ALBUMIN/GLOB SERPL: 1.5 (ref 1.1–2.2)
ALP SERPL-CCNC: 130 U/L (ref 45–117)
ALT SERPL-CCNC: 19 U/L (ref 12–78)
ANION GAP SERPL CALC-SCNC: 4 MMOL/L (ref 2–12)
AST SERPL-CCNC: 14 U/L (ref 15–37)
BILIRUB SERPL-MCNC: 0.7 MG/DL (ref 0.2–1)
BUN SERPL-MCNC: 27 MG/DL (ref 6–20)
BUN/CREAT SERPL: 16 (ref 12–20)
CALCIUM SERPL-MCNC: 10.1 MG/DL (ref 8.5–10.1)
CHLORIDE SERPL-SCNC: 108 MMOL/L (ref 97–108)
CHOLEST SERPL-MCNC: 129 MG/DL
CO2 SERPL-SCNC: 24 MMOL/L (ref 21–32)
CREAT SERPL-MCNC: 1.72 MG/DL (ref 0.7–1.3)
ERYTHROCYTE [DISTWIDTH] IN BLOOD BY AUTOMATED COUNT: 14.7 % (ref 11.5–14.5)
GLOBULIN SER CALC-MCNC: 2.9 G/DL (ref 2–4)
GLUCOSE SERPL-MCNC: 84 MG/DL (ref 65–100)
HCT VFR BLD AUTO: 45.5 % (ref 36.6–50.3)
HDLC SERPL-MCNC: 41 MG/DL
HDLC SERPL: 3.1 (ref 0–5)
HGB BLD-MCNC: 14.7 G/DL (ref 12.1–17)
LDLC SERPL CALC-MCNC: 69.4 MG/DL (ref 0–100)
MCH RBC QN AUTO: 29.6 PG (ref 26–34)
MCHC RBC AUTO-ENTMCNC: 32.3 G/DL (ref 30–36.5)
MCV RBC AUTO: 91.5 FL (ref 80–99)
NRBC # BLD: 0 K/UL (ref 0–0.01)
NRBC BLD-RTO: 0 PER 100 WBC
PLATELET # BLD AUTO: 251 K/UL (ref 150–400)
PMV BLD AUTO: 10.6 FL (ref 8.9–12.9)
POTASSIUM SERPL-SCNC: 5.8 MMOL/L (ref 3.5–5.1)
PROT SERPL-MCNC: 7.3 G/DL (ref 6.4–8.2)
RBC # BLD AUTO: 4.97 M/UL (ref 4.1–5.7)
SODIUM SERPL-SCNC: 136 MMOL/L (ref 136–145)
TRIGL SERPL-MCNC: 93 MG/DL
TSH SERPL DL<=0.05 MIU/L-ACNC: 1.46 UIU/ML (ref 0.36–3.74)
VLDLC SERPL CALC-MCNC: 18.6 MG/DL
WBC # BLD AUTO: 10.7 K/UL (ref 4.1–11.1)

## 2025-06-04 ENCOUNTER — TELEPHONE (OUTPATIENT)
Facility: CLINIC | Age: 70
End: 2025-06-04

## 2025-06-04 NOTE — TELEPHONE ENCOUNTER
Pharmacists has questions about pt Spriva. States he received the transfer   from [facility] for tiotropium (SPIRIVA) 18 MCG inhalation capsule . Pharmacists states when he spoke with pt, he was told he takes Spriva Respimat not the capsule. Pt will like a call back to verify which Spriva pt takes. Please advise.

## 2025-06-04 NOTE — TELEPHONE ENCOUNTER
Called [facility], spoke with Janessa. She was advised [facility] prescribed Spiriva Respimat. She stated she will contact office.

## 2025-06-11 ENCOUNTER — TELEPHONE (OUTPATIENT)
Facility: CLINIC | Age: 70
End: 2025-06-11

## 2025-06-11 DIAGNOSIS — I42.9 CARDIOMYOPATHY, UNSPECIFIED TYPE (HCC): ICD-10-CM

## 2025-06-11 DIAGNOSIS — E78.5 HYPERLIPIDEMIA, UNSPECIFIED HYPERLIPIDEMIA TYPE: ICD-10-CM

## 2025-06-11 NOTE — TELEPHONE ENCOUNTER
Pt needs an Rx for Asprin so that it can be added to his bubble packs. Mary states the pt is forgetting to take it otherwise. Please send to [facility] in [place].

## 2025-06-12 RX ORDER — ASPIRIN 81 MG/1
81 TABLET, CHEWABLE ORAL DAILY
Qty: 28 TABLET | Refills: 5 | Status: SHIPPED | OUTPATIENT
Start: 2025-06-12

## 2025-06-30 ENCOUNTER — RESULTS FOLLOW-UP (OUTPATIENT)
Facility: CLINIC | Age: 70
End: 2025-06-30

## 2025-08-21 ENCOUNTER — TELEPHONE (OUTPATIENT)
Facility: CLINIC | Age: 70
End: 2025-08-21

## 2025-08-21 DIAGNOSIS — M48.061 SPINAL STENOSIS, LUMBAR REGION WITHOUT NEUROGENIC CLAUDICATION: Primary | ICD-10-CM

## 2025-08-22 RX ORDER — TRAMADOL HYDROCHLORIDE 50 MG/1
50 TABLET ORAL EVERY 6 HOURS PRN
Qty: 28 TABLET | Refills: 0 | Status: SHIPPED | OUTPATIENT
Start: 2025-08-22 | End: 2025-08-29

## 2025-08-26 DIAGNOSIS — M48.061 SPINAL STENOSIS, LUMBAR REGION WITHOUT NEUROGENIC CLAUDICATION: ICD-10-CM

## 2025-08-26 RX ORDER — HYDROCORTISONE 25 MG/G
CREAM TOPICAL
Qty: 30 G | Refills: 0 | Status: SHIPPED | OUTPATIENT
Start: 2025-08-26

## 2025-08-26 RX ORDER — NAPROXEN 375 MG/1
375 TABLET ORAL 2 TIMES DAILY
Qty: 56 TABLET | Refills: 1 | Status: SHIPPED | OUTPATIENT
Start: 2025-08-26

## 2025-08-27 DIAGNOSIS — M48.061 SPINAL STENOSIS, LUMBAR REGION WITHOUT NEUROGENIC CLAUDICATION: Primary | ICD-10-CM

## 2025-08-27 RX ORDER — TIZANIDINE 2 MG/1
2 TABLET ORAL EVERY 8 HOURS PRN
Qty: 30 TABLET | Refills: 0 | Status: SHIPPED | OUTPATIENT
Start: 2025-08-27

## 2025-08-29 ENCOUNTER — TELEPHONE (OUTPATIENT)
Facility: CLINIC | Age: 70
End: 2025-08-29